# Patient Record
Sex: MALE | Race: BLACK OR AFRICAN AMERICAN | Employment: UNEMPLOYED | URBAN - METROPOLITAN AREA
[De-identification: names, ages, dates, MRNs, and addresses within clinical notes are randomized per-mention and may not be internally consistent; named-entity substitution may affect disease eponyms.]

---

## 2017-11-03 ENCOUNTER — APPOINTMENT (OUTPATIENT)
Dept: GENERAL RADIOLOGY | Age: 27
DRG: 189 | End: 2017-11-03
Attending: PHYSICIAN ASSISTANT
Payer: SELF-PAY

## 2017-11-03 ENCOUNTER — HOSPITAL ENCOUNTER (INPATIENT)
Age: 27
LOS: 3 days | Discharge: HOME | DRG: 189 | End: 2017-11-06
Attending: EMERGENCY MEDICINE | Admitting: HOSPITALIST
Payer: SELF-PAY

## 2017-11-03 DIAGNOSIS — J45.51 SEVERE PERSISTENT ASTHMA WITH ACUTE EXACERBATION: Primary | ICD-10-CM

## 2017-11-03 PROBLEM — J45.902: Status: ACTIVE | Noted: 2017-11-03

## 2017-11-03 LAB
ALBUMIN SERPL-MCNC: 3.7 G/DL (ref 3.4–5)
ALBUMIN/GLOB SERPL: 1 {RATIO} (ref 0.8–1.7)
ALP SERPL-CCNC: 51 U/L (ref 45–117)
ALT SERPL-CCNC: 21 U/L (ref 16–61)
ANION GAP SERPL CALC-SCNC: 10 MMOL/L (ref 3–18)
ARTERIAL PATENCY WRIST A: YES
ARTERIAL PATENCY WRIST A: YES
AST SERPL-CCNC: 10 U/L (ref 15–37)
BASE DEFICIT BLD-SCNC: 2 MMOL/L
BASE EXCESS BLD CALC-SCNC: 0 MMOL/L
BASOPHILS # BLD: 0 K/UL (ref 0–0.1)
BASOPHILS NFR BLD: 0 % (ref 0–2)
BDY SITE: ABNORMAL
BDY SITE: NORMAL
BILIRUB SERPL-MCNC: 1.4 MG/DL (ref 0.2–1)
BUN SERPL-MCNC: 13 MG/DL (ref 7–18)
BUN/CREAT SERPL: 12 (ref 12–20)
CALCIUM SERPL-MCNC: 9 MG/DL (ref 8.5–10.1)
CHLORIDE SERPL-SCNC: 107 MMOL/L (ref 100–108)
CO2 SERPL-SCNC: 24 MMOL/L (ref 21–32)
CREAT SERPL-MCNC: 1.09 MG/DL (ref 0.6–1.3)
DIFFERENTIAL METHOD BLD: ABNORMAL
EOSINOPHIL # BLD: 0.2 K/UL (ref 0–0.4)
EOSINOPHIL NFR BLD: 2 % (ref 0–5)
ERYTHROCYTE [DISTWIDTH] IN BLOOD BY AUTOMATED COUNT: 15 % (ref 11.6–14.5)
GAS FLOW.O2 O2 DELIVERY SYS: ABNORMAL L/MIN
GAS FLOW.O2 O2 DELIVERY SYS: NORMAL L/MIN
GLOBULIN SER CALC-MCNC: 3.7 G/DL (ref 2–4)
GLUCOSE SERPL-MCNC: 103 MG/DL (ref 74–99)
HCO3 BLD-SCNC: 25.1 MMOL/L (ref 22–26)
HCO3 BLD-SCNC: 25.4 MMOL/L (ref 22–26)
HCT VFR BLD AUTO: 47.1 % (ref 36–48)
HGB BLD-MCNC: 16.9 G/DL (ref 13–16)
LYMPHOCYTES # BLD: 2.9 K/UL (ref 0.9–3.6)
LYMPHOCYTES NFR BLD: 35 % (ref 21–52)
MCH RBC QN AUTO: 28.9 PG (ref 24–34)
MCHC RBC AUTO-ENTMCNC: 35.9 G/DL (ref 31–37)
MCV RBC AUTO: 80.5 FL (ref 74–97)
MONOCYTES # BLD: 0.6 K/UL (ref 0.05–1.2)
MONOCYTES NFR BLD: 8 % (ref 3–10)
NEUTS SEG # BLD: 4.6 K/UL (ref 1.8–8)
NEUTS SEG NFR BLD: 55 % (ref 40–73)
O2/TOTAL GAS SETTING VFR VENT: 40 %
O2/TOTAL GAS SETTING VFR VENT: 40 %
PCO2 BLD: 43.3 MMHG (ref 35–45)
PCO2 BLD: 48.2 MMHG (ref 35–45)
PEEP RESPIRATORY: 5 CMH2O
PEEP RESPIRATORY: 5 CMH2O
PH BLD: 7.32 [PH] (ref 7.35–7.45)
PH BLD: 7.38 [PH] (ref 7.35–7.45)
PIP ISTAT,IPIP: 14
PIP ISTAT,IPIP: 14
PLATELET # BLD AUTO: 222 K/UL (ref 135–420)
PMV BLD AUTO: 11 FL (ref 9.2–11.8)
PO2 BLD: 104 MMHG (ref 80–100)
PO2 BLD: 95 MMHG (ref 80–100)
POTASSIUM SERPL-SCNC: 3.8 MMOL/L (ref 3.5–5.5)
PROT SERPL-MCNC: 7.4 G/DL (ref 6.4–8.2)
RBC # BLD AUTO: 5.85 M/UL (ref 4.7–5.5)
SAO2 % BLD: 97 % (ref 92–97)
SAO2 % BLD: 97 % (ref 92–97)
SERVICE CMNT-IMP: ABNORMAL
SERVICE CMNT-IMP: NORMAL
SODIUM SERPL-SCNC: 141 MMOL/L (ref 136–145)
SPECIMEN TYPE: ABNORMAL
SPECIMEN TYPE: NORMAL
SPONTANEOUS TIMED, IST: YES
SPONTANEOUS TIMED, IST: YES
TOTAL RESP. RATE, ITRR: 17
TOTAL RESP. RATE, ITRR: 20
WBC # BLD AUTO: 8.3 K/UL (ref 4.6–13.2)

## 2017-11-03 PROCEDURE — 80053 COMPREHEN METABOLIC PANEL: CPT

## 2017-11-03 PROCEDURE — 77030013033 HC MSK BPAP/CPAP MMKA -B

## 2017-11-03 PROCEDURE — 74011000258 HC RX REV CODE- 258: Performed by: PHYSICIAN ASSISTANT

## 2017-11-03 PROCEDURE — 96365 THER/PROPH/DIAG IV INF INIT: CPT

## 2017-11-03 PROCEDURE — 94660 CPAP INITIATION&MGMT: CPT

## 2017-11-03 PROCEDURE — 94761 N-INVAS EAR/PLS OXIMETRY MLT: CPT

## 2017-11-03 PROCEDURE — 74011000250 HC RX REV CODE- 250: Performed by: HOSPITALIST

## 2017-11-03 PROCEDURE — 82803 BLOOD GASES ANY COMBINATION: CPT

## 2017-11-03 PROCEDURE — 65660000004 HC RM CVT STEPDOWN

## 2017-11-03 PROCEDURE — 74011250636 HC RX REV CODE- 250/636: Performed by: PHYSICIAN ASSISTANT

## 2017-11-03 PROCEDURE — 74011250636 HC RX REV CODE- 250/636: Performed by: HOSPITALIST

## 2017-11-03 PROCEDURE — 94640 AIRWAY INHALATION TREATMENT: CPT

## 2017-11-03 PROCEDURE — 77030029684 HC NEB SM VOL KT MONA -A

## 2017-11-03 PROCEDURE — 94644 CONT INHLJ TX 1ST HOUR: CPT

## 2017-11-03 PROCEDURE — 36600 WITHDRAWAL OF ARTERIAL BLOOD: CPT

## 2017-11-03 PROCEDURE — 74011000250 HC RX REV CODE- 250: Performed by: PHYSICIAN ASSISTANT

## 2017-11-03 PROCEDURE — 74011250636 HC RX REV CODE- 250/636: Performed by: EMERGENCY MEDICINE

## 2017-11-03 PROCEDURE — 74011000258 HC RX REV CODE- 258: Performed by: HOSPITALIST

## 2017-11-03 PROCEDURE — 74011636637 HC RX REV CODE- 636/637: Performed by: PHYSICIAN ASSISTANT

## 2017-11-03 PROCEDURE — 99285 EMERGENCY DEPT VISIT HI MDM: CPT

## 2017-11-03 PROCEDURE — 71010 XR CHEST PORT: CPT

## 2017-11-03 PROCEDURE — 74011000250 HC RX REV CODE- 250

## 2017-11-03 PROCEDURE — 85025 COMPLETE CBC W/AUTO DIFF WBC: CPT

## 2017-11-03 RX ORDER — ALBUTEROL SULFATE 0.83 MG/ML
SOLUTION RESPIRATORY (INHALATION)
Status: COMPLETED
Start: 2017-11-03 | End: 2017-11-03

## 2017-11-03 RX ORDER — ALBUTEROL SULFATE 0.83 MG/ML
2.5 SOLUTION RESPIRATORY (INHALATION)
Status: DISCONTINUED | OUTPATIENT
Start: 2017-11-03 | End: 2017-11-06 | Stop reason: HOSPADM

## 2017-11-03 RX ORDER — MAGNESIUM SULFATE HEPTAHYDRATE 40 MG/ML
2 INJECTION, SOLUTION INTRAVENOUS
Status: COMPLETED | OUTPATIENT
Start: 2017-11-03 | End: 2017-11-03

## 2017-11-03 RX ORDER — ALBUTEROL SULFATE 2.5 MG/.5ML
10 SOLUTION RESPIRATORY (INHALATION) CONTINUOUS
Status: DISCONTINUED | OUTPATIENT
Start: 2017-11-03 | End: 2017-11-06 | Stop reason: HOSPADM

## 2017-11-03 RX ORDER — ALBUTEROL SULFATE 0.83 MG/ML
2.5 SOLUTION RESPIRATORY (INHALATION)
Status: COMPLETED | OUTPATIENT
Start: 2017-11-03 | End: 2017-11-03

## 2017-11-03 RX ORDER — SODIUM CHLORIDE 0.9 % (FLUSH) 0.9 %
5-10 SYRINGE (ML) INJECTION AS NEEDED
Status: DISCONTINUED | OUTPATIENT
Start: 2017-11-03 | End: 2017-11-06 | Stop reason: HOSPADM

## 2017-11-03 RX ORDER — IPRATROPIUM BROMIDE AND ALBUTEROL SULFATE 2.5; .5 MG/3ML; MG/3ML
3 SOLUTION RESPIRATORY (INHALATION)
Status: COMPLETED | OUTPATIENT
Start: 2017-11-03 | End: 2017-11-03

## 2017-11-03 RX ORDER — IPRATROPIUM BROMIDE 0.5 MG/2.5ML
0.5 SOLUTION RESPIRATORY (INHALATION)
Status: DISCONTINUED | OUTPATIENT
Start: 2017-11-03 | End: 2017-11-06 | Stop reason: HOSPADM

## 2017-11-03 RX ORDER — ENOXAPARIN SODIUM 100 MG/ML
40 INJECTION SUBCUTANEOUS EVERY 24 HOURS
Status: DISCONTINUED | OUTPATIENT
Start: 2017-11-03 | End: 2017-11-06 | Stop reason: HOSPADM

## 2017-11-03 RX ORDER — ALBUTEROL SULFATE 2.5 MG/.5ML
SOLUTION RESPIRATORY (INHALATION)
Status: COMPLETED
Start: 2017-11-03 | End: 2017-11-03

## 2017-11-03 RX ORDER — MAGNESIUM SULFATE HEPTAHYDRATE 40 MG/ML
2 INJECTION, SOLUTION INTRAVENOUS ONCE
Status: COMPLETED | OUTPATIENT
Start: 2017-11-03 | End: 2017-11-03

## 2017-11-03 RX ORDER — MAGNESIUM SULFATE HEPTAHYDRATE 40 MG/ML
2 INJECTION, SOLUTION INTRAVENOUS ONCE
Status: DISCONTINUED | OUTPATIENT
Start: 2017-11-03 | End: 2017-11-03

## 2017-11-03 RX ORDER — SODIUM CHLORIDE 0.9 % (FLUSH) 0.9 %
5-10 SYRINGE (ML) INJECTION EVERY 8 HOURS
Status: DISCONTINUED | OUTPATIENT
Start: 2017-11-03 | End: 2017-11-06 | Stop reason: HOSPADM

## 2017-11-03 RX ORDER — PREDNISONE 20 MG/1
60 TABLET ORAL
Status: COMPLETED | OUTPATIENT
Start: 2017-11-03 | End: 2017-11-03

## 2017-11-03 RX ORDER — ALBUTEROL SULFATE 2.5 MG/.5ML
5 SOLUTION RESPIRATORY (INHALATION)
Status: COMPLETED | OUTPATIENT
Start: 2017-11-03 | End: 2017-11-03

## 2017-11-03 RX ADMIN — CEFTRIAXONE SODIUM 1 G: 1 INJECTION, POWDER, FOR SOLUTION INTRAMUSCULAR; INTRAVENOUS at 20:07

## 2017-11-03 RX ADMIN — Medication 10 ML: at 17:07

## 2017-11-03 RX ADMIN — MAGNESIUM SULFATE IN WATER 2 G: 40 INJECTION, SOLUTION INTRAVENOUS at 17:19

## 2017-11-03 RX ADMIN — ALBUTEROL SULFATE 10 MG: 2.5 SOLUTION RESPIRATORY (INHALATION) at 10:37

## 2017-11-03 RX ADMIN — IPRATROPIUM BROMIDE AND ALBUTEROL SULFATE 3 ML: .5; 3 SOLUTION RESPIRATORY (INHALATION) at 09:00

## 2017-11-03 RX ADMIN — MAGNESIUM SULFATE IN WATER 2 G: 40 INJECTION, SOLUTION INTRAVENOUS at 09:00

## 2017-11-03 RX ADMIN — ENOXAPARIN SODIUM 40 MG: 40 INJECTION SUBCUTANEOUS at 19:41

## 2017-11-03 RX ADMIN — ALBUTEROL SULFATE 5 MG: 2.5 SOLUTION RESPIRATORY (INHALATION) at 09:08

## 2017-11-03 RX ADMIN — METHYLPREDNISOLONE SODIUM SUCCINATE 65 MG: 40 INJECTION, POWDER, FOR SOLUTION INTRAMUSCULAR; INTRAVENOUS at 17:19

## 2017-11-03 RX ADMIN — ALBUTEROL SULFATE 2.5 MG: 2.5 SOLUTION RESPIRATORY (INHALATION) at 18:19

## 2017-11-03 RX ADMIN — PREDNISONE 60 MG: 20 TABLET ORAL at 09:00

## 2017-11-03 RX ADMIN — METHYLPREDNISOLONE SODIUM SUCCINATE 125 MG: 125 INJECTION, POWDER, FOR SOLUTION INTRAMUSCULAR; INTRAVENOUS at 19:41

## 2017-11-03 RX ADMIN — ALBUTEROL SULFATE 2.5 MG: 2.5 SOLUTION RESPIRATORY (INHALATION) at 09:33

## 2017-11-03 RX ADMIN — DOXYCYCLINE 100 MG: 100 INJECTION, POWDER, LYOPHILIZED, FOR SOLUTION INTRAVENOUS at 15:04

## 2017-11-03 RX ADMIN — ALBUTEROL SULFATE 2.5 MG: 0.83 SOLUTION RESPIRATORY (INHALATION) at 09:33

## 2017-11-03 RX ADMIN — Medication 10 ML: at 21:39

## 2017-11-03 NOTE — H&P
[address] [place] [address] PS    Name:  Saud Hernandez  MR#:  [id number]  :  1990  Account #:  [de-identified]  Date of Adm:  2017      CHIEF COMPLAINT: Shortness of breath. HISTORY OF PRESENT ILLNESS: The patient is a very pleasant 32  year-old [facility] gentleman with a known history of chronic  persistent asthma. He states that over the last 3 years, it has been  getting worse. He states that this morning when he woke up, he noted  that his chest was tight and he had worsening shortness of breath. He  subsequently used his nebulizers at home; however, this did not  improve his respiratory function. He continued to have shortness of  breath and subsequently came to the emergency department at  [facility] for further evaluation. The patient denies any recent sick contacts. He denies any recent cold  or upper respiratory illness symptoms. He indicates that he has never  had to be intubated for his asthma symptoms, however, he has been  admitted to the hospital before for uncontrolled asthma. PAST MEDICAL HISTORY: Asthma. SURGICAL HISTORY: Negative. SOCIAL HISTORY: He is a , he smokes 0-04 cigarettes per  day, he denies alcohol or illicit drug use. FAMILY HISTORY: Mother with a history of hypertension and  prediabetes. Father's medical history is unknown. HOME MEDICATIONS: None. REVIEW OF SYSTEMS  Complete 12-point review of systems is negative other than that noted  in the HPI above. PHYSICAL EXAMINATION  VITAL SIGNS: Temperature 97.9, pulse 78, respiration 16, blood  pressure 126/62, pulse oximetry 99% on BiPAP. GENERAL: The patient is a well-nourished 45-year-old male in no  acute distress. HEENT: Eyes PERRLA. Ears are clear. Nose is clear. No rhinorrhea or  sinusitis. Throat is clear with no pharyngitis or oral lesions. Head  normocephalic, atraumatic. NECK: Supple. No masses or tenderness.  No thyromegaly or  lymphadenopathy is present. LUNGS: Are tight sounding anteriorly with no appreciable wheezes  present in the anterior lung fields. Posteriorly, he has some very mild  expiratory wheezes heard throughout the lung field. No prolonged  expiratory phase is appreciated. HEART: Regular. No murmurs, rubs or gallops. ABDOMEN: Soft, nontender, nondistended with bowel sounds present  in all 4 quadrants. EXTREMITIES: Warm. Pulses are patent bilaterally. No lower  extremity edema is present. SKIN: No lesions or rashes. NEUROLOGIC: Cranial nerves 2-12 are grossly intact. LABORATORY DATA: WBC is 8.3, hemoglobin 16.9, hematocrit 47.1,  platelets 239. Sodium 141, potassium 3.8, chloride 107, bicarbonate  27, BUN 13, creatinine 1.09. LFTs are within normal limits. Arterial  blood gas shows a pH of 7.324, pCO2 of 48.2, pO2 of 104. This is  on 40% FIO2, BiPAP, rate of 17, and PEEP of 5. Chest x-ray was  reviewed. This is negative for any acute cardiopulmonary process. No  evidence of any infiltrate. ASSESSMENT: Acute asthma exacerbation with hypoxemic  respiratory failure. PLAN: The patient is admitted to step-down unit on BiPAP. We will  wean BiPAP as tolerated to room air. For his asthma exacerbation, I  have written for DuoNebs including albuterol and ipratropium and I  have ordered magnesium 2 grams IV. With regard to DVT prophylaxis,  I have ordered Lovenox daily. I have ordered Solu-Medrol 125 mg IV  every 8 hours and I have only done this for 3 doses. Further steroids  per the daytime hospitalist tomorrow. THE PATIENT IS A FULL CODE. I anticipate discharge home in the next 2-3 days when his respiratory  function is stabilized.         MD Lizette Larose / Erika Sadler  D:  11/03/2017   14:22  T:  11/03/2017   14:54  Job #:  [id number]

## 2017-11-03 NOTE — PROGRESS NOTES
Placed pt on BiPap 14/5, 40% FiO2. Pt in significant distress sitting at edge of bed. Placed patient fully in bed and connected BiPap mask. WOB immediately improved and pt fell asleep. HR, RR, and BS improved. ABG normal on current settings. Will continue to monitor.

## 2017-11-03 NOTE — ED NOTES
Ambulated pt to nursing station. Pt noted to have labored breathing during ambulation with O2 sats ranging from 87% to 93% on RA. Dr. Santiago Burns and NEYDA Morillo aware. Pt placed back on BiPAP and cardiac monitor. Will continue to monitor.

## 2017-11-03 NOTE — ED NOTES
Pt currently sleeping on stretcher on cardiac monitor. Pt remains on BiPAP at this time. Will transition off once pt is awake. VSS. Will continue to monitor.

## 2017-11-03 NOTE — ED NOTES
Received bedside report from Drea Velasco, [facility]. Pt sitting on bedside in mild amount of distress. Pt currently receiving a Duoneb treatment. Pt mild audible wheezes. NEYDA Antonio at the bedside to evaluate pt.

## 2017-11-03 NOTE — ROUTINE PROCESS
TRANSFER - OUT REPORT:    Verbal report given to Jj RN(name) on Lyn Awad  being transferred to CVT Stepdown(unit) for routine progression of care       Report consisted of patients Situation, Background, Assessment and   Recommendations(SBAR). Information from the following report(s) SBAR, ED Summary, Intake/Output, MAR, Recent Results and Cardiac Rhythm NSR was reviewed with the receiving nurse. Lines:   Peripheral IV 11/03/17 Right Antecubital (Active)   Site Assessment Clean, dry, & intact 11/3/2017  9:22 AM   Phlebitis Assessment 0 11/3/2017  9:22 AM   Infiltration Assessment 0 11/3/2017  9:22 AM   Dressing Status Clean, dry, & intact 11/3/2017  9:22 AM   Dressing Type Transparent 11/3/2017  9:22 AM        Opportunity for questions and clarification was provided.       Patient transported with:   Monitor  O2 @ BiPAP liters  Registered Nurse  Tech   Patient belongings

## 2017-11-03 NOTE — ED NOTES
Pt skleeping in bed comfortably on bedside monitor. Call bell is in reach. Bed in LLP. Will continue to monitor.

## 2017-11-03 NOTE — ED NOTES
Ambulated pt around department. Pt noted to become increasingly lethargic with ambulation. During ambulation pts' O2 sats were at 94% on RA and HR in the 130's. Pt required frequent rests with ambulation and required a wheel chair to get back to room. Pt reports that he feels like he needs to be placed back on BiPAP. Pt placed back on BiPAP. PA made aware.

## 2017-11-03 NOTE — ED NOTES
Pt taken off of BiPAP at this time. Pt noted to be very lethargic. Pt encouraged to try to remain awake. Pt reports that he feels much better. Pt's lungs are CTA. Pt maintaining O2 sats at 98% on RA. NEYDA Antonio made aware.

## 2017-11-03 NOTE — ED PROVIDER NOTES
HPI Comments: Katiana Beckwith is a 32 y.o. Male with a medical history of Asthma, who presents today with a 2 hour history of SOB, which awoke him from his sleep. He tried 2 puffs of albuterol at home with no relief. He denies any known triggers, was hospitalized last year for asthma, and has never been intubated. He is speaking in full sentences but very distressed and diaphoretic. He denies any recent illness. Pt denies any fevers or chills, headache, dizziness or light headedness, ENT issues, CP or discomfort, cough, n/v/d/c, abd pain, back pain, melena/hematochezia, dysuria, hematuria, frequency, focal weakness/numbness/tingling, or rash. Patient has no other complaints at this time. The history is provided by the patient. No past medical history on file. No past surgical history on file. No family history on file. Social History     Social History    Marital status: SINGLE     Spouse name: N/A    Number of children: N/A    Years of education: N/A     Occupational History    Not on file. Social History Main Topics    Smoking status: Not on file    Smokeless tobacco: Not on file    Alcohol use Not on file    Drug use: Not on file    Sexual activity: Not on file     Other Topics Concern    Not on file     Social History Narrative         ALLERGIES: Review of patient's allergies indicates no known allergies. Review of Systems   Constitutional: Positive for diaphoresis. Negative for chills, fatigue and fever. HENT: Negative for congestion, rhinorrhea and sore throat. Eyes: Negative for discharge. Respiratory: Positive for chest tightness, shortness of breath and wheezing. Negative for cough and stridor. Cardiovascular: Positive for palpitations. Negative for chest pain. Gastrointestinal: Negative for abdominal pain, constipation, diarrhea, nausea and vomiting. Genitourinary: Negative for difficulty urinating, dysuria and urgency.    Musculoskeletal: Negative for back pain and neck pain. Skin: Negative for color change, pallor, rash and wound. Neurological: Negative for dizziness, syncope, light-headedness and headaches. Hematological: Does not bruise/bleed easily. Vitals:    11/03/17 0841   BP: 132/87   Pulse: (!) 110   Resp: 24   SpO2: 96%   Weight: 136.1 kg (300 lb)   Height: 6' 4\" (1.93 m)            Physical Exam   Constitutional: He is oriented to person, place, and time. He appears distressed. He is not intubated. Pulmonary/Chest: Accessory muscle usage present. Tachypnea noted. No apnea. He is not intubated. He is in respiratory distress. He has decreased breath sounds. He has wheezes in the right upper field, the right middle field, the right lower field, the left upper field, the left middle field and the left lower field. He has no rhonchi. He has no rales. Abdominal: Soft. Bowel sounds are normal. There is no tenderness. Musculoskeletal: Normal range of motion. Neurological: He is alert and oriented to person, place, and time. Skin: Skin is warm. No rash noted. He is diaphoretic. No erythema. No pallor. Psychiatric: He has a normal mood and affect. His behavior is normal.   Nursing note and vitals reviewed. MDM  Number of Diagnoses or Management Options  Severe persistent asthma with acute exacerbation:   Diagnosis management comments: DDx:  viral vs bacterial URI, asthma exacerbation, COPD, bronchitis, PNA, PE, angina/MI, allergies, pneumothorax, atypical CP, costochondritis/chest wall pain, HF, TAA/AAA, pericarditis, trauma (cardiac contusion, rib Fx, etc),  pleural effusion, intraabdominal process, anxiety/panic attack, anaphylaxis    Plan: Order duo neb, 60 mg of prednisone, and 2g of mag. Reassess patient :after treatment. Will order portable chest xray to rule out any acute cardiopulmonary process.    9:34 AM After 1 duo neb, 5 mg of albuterol, 60 prednisone, and 2 g of Mag patient still tachypnea at a resp rate of 30 and 02 sat of 97%, diffuse wheezing throughout all lung fields. Spoke with respiratory and they agree to put him on Bipap and I have ordered continuous nebs through bipap.     1000: AM Talked with Husam from respiratory, he has placed Bipap and patient has improved. ABG ordered and normal at 0953    10:30 AM Patient states he is feeling much better. Accessory muscle use has ceased. HR 87 and RR 20. Will attempt to take of BiPaP and ambulate patient to see how he does off BiPaP. Will repeat ABG. 12:43 PM: Nursing Ambulated pt around department. Pt noted to become increasingly lethargic with ambulation. O2 sat was 94% on RA and HR in the 130's. Pt required frequent rests with ambulation and required a wheel chair to get back to room. Pt reports that he feels like he needs to be placed back on BiPAP. Pt placed back on BiPAP.     1:44 PM Spoke with Dr. Consuelo Hickman about the patient and he has agreed to see the patient. He requested 65 steroids, 1g of rocephin and 100 of doxy IV. No acute cardiopulmonary process on chest xray.         Amount and/or Complexity of Data Reviewed  Clinical lab tests: ordered and reviewed    Risk of Complications, Morbidity, and/or Mortality  Presenting problems: moderate  Diagnostic procedures: moderate  Management options: moderate      ED Course       Procedures

## 2017-11-03 NOTE — IP AVS SNAPSHOT
Sarah Stout 
 
 
 [address] [facility] [address] 
[phone] Patient: Shoshana Regalado MRN: [id number] :1990 My Medications TAKE these medications as instructed Instructions Each Dose to Equal  
 Morning Noon Evening Bedtime  
 azithromycin 250 mg tablet Commonly known as:  Marina Encarnacion Your last dose was: Your next dose is: Take 1 Tab by mouth daily for 5 days. 250 mg  
    
   
   
   
  
 guaiFENesin  mg ER tablet Commonly known as:  [employer] Your last dose was: Your next dose is: Take 1 Tab by mouth two (2) times a day. 600 mg  
    
   
   
   
  
 ipratropium-albuterol  mcg/actuation inhaler Commonly known as:  Ting Floyd Your last dose was: Your next dose is: Take 1 Puff by inhalation every six (6) hours. 1 Puff * predniSONE 20 mg tablet Commonly known as:  Carlo Cruz Your last dose was: Your next dose is: Take 2 Tabs by mouth daily (with breakfast). For 3 days. 40 mg  
    
   
   
   
  
 * predniSONE 20 mg tablet Commonly known as:  Carlo Cruz Your last dose was: Your next dose is: Take 1 Tab by mouth daily (with breakfast) for 3 days. 20 mg  
    
   
   
   
  
 * predniSONE 10 mg tablet Commonly known as:  Carlo Cruz Your last dose was: Your next dose is: Take 1 Tab by mouth daily (with breakfast) for 3 days. 10 mg  
    
   
   
   
  
 * Notice: This list has 3 medication(s) that are the same as other medications prescribed for you. Read the directions carefully, and ask your doctor or other care provider to review them with you. Where to Get Your Medications Information on where to get these meds will be given to you by the nurse or doctor. ! Ask your nurse or doctor about these medications azithromycin 250 mg tablet  
 guaiFENesin  mg ER tablet  
 ipratropium-albuterol  mcg/actuation inhaler  
 predniSONE 10 mg tablet  
 predniSONE 20 mg tablet  
 predniSONE 20 mg tablet

## 2017-11-03 NOTE — IP AVS SNAPSHOT
[address] [address] 
[phone] Patient: Justin Tinajero MRN: [id number] :1990 About your hospitalization You were admitted on:  November 3, 2017 You last received care in the:  [facility] 2 CV STEPDOWN You were discharged on:  2017 Why you were hospitalized Your primary diagnosis was:  Not on File Your diagnoses also included:  Status Asthmaticus, Non-Allergic Things You Need To Do (next 8 weeks)  Follow up with Paddy Galvan MD  
@ 10am  
  
Phone:  [phone] Where:  [facility], [address] [place] [address], [address] [place] [address]  New Patient with Floridalma Owens MD at  8:30 AM  
Where: [address] ([facility]) Follow up with Yefri Betts MD  
@ 3010 Phone:  [phone] Where:  [address] [place] [id number] Discharge Orders None A check kaelyn indicates which time of day the medication should be taken. My Medications TAKE these medications as instructed Instructions Each Dose to Equal  
 Morning Noon Evening Bedtime  
 azithromycin 250 mg tablet Commonly known as:  Sherren Mohair Your last dose was: Your next dose is: Take 1 Tab by mouth daily for 5 days. 250 mg  
    
   
   
   
  
 guaiFENesin  mg ER tablet Commonly known as:  Dean & Dean Your last dose was: Your next dose is: Take 1 Tab by mouth two (2) times a day. 600 mg  
    
   
   
   
  
 ipratropium-albuterol  mcg/actuation inhaler Commonly known as:  Jose Erwin Your last dose was: Your next dose is: Take 1 Puff by inhalation every six (6) hours. 1 Puff * predniSONE 20 mg tablet Commonly known as:  Efraín Bello  
 Your last dose was: Your next dose is: Take 2 Tabs by mouth daily (with breakfast). For 3 days. 40 mg  
    
   
   
   
  
 * predniSONE 20 mg tablet Commonly known as:  Mario Alberto Khan Your last dose was: Your next dose is: Take 1 Tab by mouth daily (with breakfast) for 3 days. 20 mg  
    
   
   
   
  
 * predniSONE 10 mg tablet Commonly known as:  Mario Alberto Khan Your last dose was: Your next dose is: Take 1 Tab by mouth daily (with breakfast) for 3 days. 10 mg  
    
   
   
   
  
 * Notice: This list has 3 medication(s) that are the same as other medications prescribed for you. Read the directions carefully, and ask your doctor or other care provider to review them with you. Where to Get Your Medications Information on where to get these meds will be given to you by the nurse or doctor. ! Ask your nurse or doctor about these medications  
  azithromycin 250 mg tablet  
 guaiFENesin  mg ER tablet  
 ipratropium-albuterol  mcg/actuation inhaler  
 predniSONE 10 mg tablet  
 predniSONE 20 mg tablet  
 predniSONE 20 mg tablet Discharge Instructions Patient armband removed and shredded Take medication as directed. If unable to keep follow up appointment please call and inform office at least 24hours prior to appointment [employer] Announcement We are excited to announce that we are making your provider's discharge notes available to you in [employer]. You will see these notes when they are completed and signed by the physician that discharged you from your recent hospital stay. If you have any questions or concerns about any information you see in [employer], please call the Health Information Department where you were seen or reach out to your Primary Care Provider for more information about your plan of care. Introducing [facility]! [facility] introduces Koozoo patient portal. Now you can access parts of your medical record, email your doctor's office, and request medication refills online. 1. In your internet browser, go to [email or web address] 2. Click on the First Time User? Click Here link in the Sign In box. You will see the New Member Sign Up page. 3. Enter your Koozoo Access Code exactly as it appears below. You will not need to use this code after youve completed the sign-up process. If you do not sign up before the expiration date, you must request a new code. · Koozoo Access Code: [email or web address] Expires: 2/4/2018  1:26 PM 
 
4. Enter the last four digits of your Social Security Number (xxxx) and Date of Birth (mm/dd/yyyy) as indicated and click Submit. You will be taken to the next sign-up page. 5. Create a Koozoo ID. This will be your Koozoo login ID and cannot be changed, so think of one that is secure and easy to remember. 6. Create a Koozoo password. You can change your password at any time. 7. Enter your Password Reset Question and Answer. This can be used at a later time if you forget your password. 8. Enter your e-mail address. You will receive e-mail notification when new information is available in [address] 9. Click Sign Up. You can now view and download portions of your medical record. 10. Click the Download Summary menu link to download a portable copy of your medical information. If you have questions, please visit the Frequently Asked Questions section of the Koozoo website. Remember, Koozoo is NOT to be used for urgent needs. For medical emergencies, dial 911. Now available from your iPhone and Android! Providers Seen During Your Hospitalization Provider Specialty Primary office phone Gaby Rojo DO Emergency Medicine [phone] Abel Massey MD Family Practice [phone] Immunizations Administered for This Admission Name Date Influenza Vaccine (Quad) PF  Deferred () Your Primary Care Physician (PCP) Primary Care Physician Office Phone Office Fax Horacio Durbin [phone] You are allergic to the following No active allergies Recent Documentation Height Weight BMI  
  
  
 1.93 m 144 kg 38.64 kg/m2 Emergency Contacts Name Discharge Info Relation Home Work Mobile Michelle Flood DISCHARGE CAREGIVER [3] Parent [1] [phone] [facility] DISCHARGE CAREGIVER [3] Spouse [3] [phone] Patient Belongings The following personal items are in your possession at time of discharge: 
  Dental Appliances: None  Visual Aid: None      Home Medications: None   Jewelry: Watch  Clothing: Belt, Footwear, Pants, Shirt, Socks, Undergarments    Other Valuables: None Discharge Instructions Attachments/References ASTHMA ATTACK (ENGLISH) ASTHMA TRIGGERS: GENERAL INFO (ENGLISH) ASTHMA: GENERAL INFO (ENGLISH) HEART FAILURE (ENGLISH) SMOKING: STOPPING (ENGLISH) SMOKING CESSATION: HEALTH BENEFITS: GENERAL INFO (ENGLISH) DRUG USE: MARIJUANA (ENGLISH) Patient Handouts Asthma Attack: Care Instructions Your Care Instructions During an asthma attack, the airways swell and narrow. This makes it hard to breathe. Severe asthma attacks can be life-threatening, but you can help prevent them by keeping your asthma under control and treating symptoms before they get bad. Symptoms include being short of breath, having chest tightness, coughing, and wheezing. Noting and treating these symptoms can also help you avoid future trips to the emergency room. The doctor has checked you carefully, but problems can develop later. If you notice any problems or new symptoms, get medical treatment right away. Follow-up care is a key part of your treatment and safety.  Be sure to make and go to all appointments, and call your doctor if you are having problems. It's also a good idea to know your test results and keep a list of the medicines you take. How can you care for yourself at home? · Follow your asthma action plan to prevent and treat attacks. If you don't have an asthma action plan, work with your doctor to create one. · Take your asthma medicines exactly as prescribed. Talk to your doctor right away if you have any questions about how to take them. ¨ Use your quick-relief medicine when you have symptoms of an attack. Quick-relief medicine is usually an albuterol inhaler. Some people need to use quick-relief medicine before they exercise. ¨ Take your controller medicine every day, not just when you have symptoms. Controller medicine is usually an inhaled corticosteroid. The goal is to prevent problems before they occur. Don't use your controller medicine to treat an attack that has already started. It doesn't work fast enough to help. ¨ If your doctor prescribed corticosteroid pills to use during an attack, take them exactly as prescribed. It may take hours for the pills to work, but they may make the episode shorter and help you breathe better. ¨ Keep your quick-relief medicine with you at all times. · Talk to your doctor before using other medicines. Some medicines, such as aspirin, can cause asthma attacks in some people. · If you have a peak flow meter, use it to check how well you are breathing. This can help you predict when an asthma attack is going to occur. Then you can take medicine to prevent the asthma attack or make it less severe. · Do not smoke or allow others to smoke around you. Avoid smoky places. Smoking makes asthma worse. If you need help quitting, talk to your doctor about stop-smoking programs and medicines. These can increase your chances of quitting for good. · Learn what triggers an asthma attack for you, and avoid the triggers when you can.  Common triggers include colds, smoke, air pollution, dust, pollen, mold, pets, cockroaches, stress, and cold air. · Avoid colds and the flu. Get a pneumococcal vaccine shot. If you have had one before, ask your doctor if you need a second dose. Get a flu vaccine every fall. If you must be around people with colds or the flu, wash your hands often. When should you call for help? Call 911 anytime you think you may need emergency care. For example, call if: 
? · You have severe trouble breathing. ?Call your doctor now or seek immediate medical care if: 
? · Your symptoms do not get better after you have followed your asthma action plan. ? · You have new or worse trouble breathing. ? · Your coughing and wheezing get worse. ? · You cough up dark brown or bloody mucus (sputum). ? · You have a new or higher fever. ? Watch closely for changes in your health, and be sure to contact your doctor if: 
? · You need to use quick-relief medicine on more than 2 days a week (unless it is just for exercise). ? · You cough more deeply or more often, especially if you notice more mucus or a change in the color of your mucus. ? · You are not getting better as expected. Where can you learn more? Go to http://evangelina-anne-marie.info/. Enter J591 in the search box to learn more about \"Asthma Attack: Care Instructions. \" Current as of: May 12, 2017 Content Version: 11.4 © 3598-9501 [employer]. Care instructions adapted under license by [employer] (which disclaims liability or warranty for this information). If you have questions about a medical condition or this instruction, always ask your healthcare professional. [address] any warranty or liability for your use of this information. Learning About Asthma Triggers What are asthma triggers? When you have asthma, certain things can make your symptoms worse. These are called triggers.  Learn what triggers an asthma attack for you, and avoid the triggers when you can. Common triggers include colds, smoke, air pollution, dust, pollen, pets, stress, and cold air. How do asthma triggers affect you? Triggers can make it harder for your lungs to work as they should. They can lead to sudden breathing problems and other symptoms. When you are around a trigger, an asthma attack is more likely. If your symptoms are severe, you may need emergency treatment or have to go to the hospital for treatment. What can you do to avoid triggers? The first thing is to know your triggers. When you are having symptoms, note the things around you that might be causing them. Then look for patterns that may be triggering your symptoms. Record your triggers on a piece of paper or in an asthma diary. When you have your list of possible triggers, work with your doctor to find ways to avoid them. Avoid colds and flu. Get a pneumococcal vaccine shot. If you have had one before, ask your doctor whether you need a second dose. Get a flu vaccine every year, as soon as it's available. If you must be around people with colds or the flu, wash your hands often. Here are some ways to avoid a few common triggers. · Do not smoke or allow others to smoke around you. If you need help quitting, talk to your doctor about stop-smoking programs and medicines. These can increase your chances of quitting for good. · If there is a lot of pollution, pollen, or dust outside, stay at home and keep your windows closed. Use an air conditioner or air filter in your home. Check your local weather report or newspaper for air quality and pollen reports. What else should you know? · Take your controller medicine every day, not just when you have symptoms. It helps prevent problems before they occur. · Your doctor may suggest that you check how well your lungs are working by measuring your peak expiratory flow (PEF) throughout the day.  Your PEF may drop when you are near things that trigger symptoms. Where can you learn more? Go to http://evangelina-anne-marie.info/. Enter B854 in the search box to learn more about \"Learning About Asthma Triggers. \" Current as of: May 12, 2017 Content Version: 11.4 © 4617-6249 [employer]. Care instructions adapted under license by "[employer]" (which disclaims liability or warranty for this information). If you have questions about a medical condition or this instruction, always ask your healthcare professional. [address] any warranty or liability for your use of this information. Learning About Asthma What is asthma? Asthma is a long-term condition that affects your breathing. It causes the airways that lead to the lungs to swell. People with asthma may have asthma attacks. During an asthma attack, the airways tighten and become narrower. This makes it hard to breathe, and you may wheeze or cough. If you have a bad asthma attack, you may need emergency care. Asthma affects people in different ways. Some people only have asthma attacks during allergy season, or when they breathe in cold air, or when they exercise. Others have many bad attacks that send them to the doctor often. What are the symptoms? Symptoms of asthma can be mild or severe. You may have mild attacks now and then, you may have severe symptoms every day, or you may have something in between. How often you have symptoms can also change. When you have asthma, you may: · Wheeze, making a loud or soft whistling noise when you breathe in and out. · Cough a lot. · Feel tightness in your chest. 
· Feel short of breath. · Have trouble sleeping because of coughing or having a hard time breathing. · Get tired quickly during exercise. Your symptoms may be worse at night. How can you prevent asthma attacks? Certain things can make asthma symptoms worse. These are called triggers. When you are around a trigger, an asthma attack is more likely. Common triggers include: · Cigarette smoke or air pollution. · Things you are allergic to, such as: 
¨ Pollen, mold, or dust mites. ¨ Pet hair, skin, or saliva. · Illnesses, like colds, flu, or pneumonia. · Exercise. · Dry, cold air. Here are some ways to avoid a few common triggers: · Do not smoke or allow others to smoke around you. If you need help quitting, talk to your doctor about stop-smoking programs and medicines. These can increase your chances of quitting for good. · If there is a lot of pollution, pollen, or dust outside, stay at home and keep your windows closed. Use an air conditioner or air filter in your home. Check your local weather report or newspaper for air quality and pollen reports. · Get the flu vaccine every year. Talk to your doctor about getting a pneumococcal shot. Wash your hands often to prevent infections. · Avoid exercising outdoors in cold weather. If you are outdoors in cold weather, wear a scarf around your face and breathe through your nose. How is asthma treated? There are two parts to treating asthma, which are outlined in your asthma action plan. The goals are to: 
· Control asthma over the long term. The asthma action plan tells you which medicine you may need to take every day. This is called a controller medicine. It helps to reduce the swelling of the airways and prevent asthma attacks. · Treat asthma attacks when they occur. The asthma action plan tells you what to do when you have an asthma attack. It helps you identify triggers that can cause your attacks. You use quick-relief medicine during an attack. The asthma plan also helps you track your symptoms and know how well the treatment is working. Follow-up care is a key part of your treatment and safety.  Be sure to make and go to all appointments, and call your doctor if you are having problems. It's also a good idea to know your test results and keep a list of the medicines you take. Where can you learn more? Go to http://evangelina-anne-marie.info/. Enter 9187 [id number] in the search box to learn more about \"Learning About Asthma. \" Current as of: May 12, 2017 Content Version: 11.4 © 9543-9852 [employer]. Care instructions adapted under license by [employer] (which disclaims liability or warranty for this information). If you have questions about a medical condition or this instruction, always ask your healthcare professional. [address] any warranty or liability for your use of this information. Heart Failure: Care Instructions Your Care Instructions Heart failure occurs when your heart does not pump as much blood as the body needs. Failure does not mean that the heart has stopped pumping but rather that it is not pumping as well as it should. Over time, this causes fluid buildup in your lungs and other parts of your body. Fluid buildup can cause shortness of breath, fatigue, swollen ankles, and other problems. By taking medicines regularly, reducing sodium (salt) in your diet, checking your weight every day, and making lifestyle changes, you can feel better and live longer. Follow-up care is a key part of your treatment and safety. Be sure to make and go to all appointments, and call your doctor if you are having problems. It's also a good idea to know your test results and keep a list of the medicines you take. How can you care for yourself at home? Medicines ? · Be safe with medicines. Take your medicines exactly as prescribed. Call your doctor if you think you are having a problem with your medicine.   
? · Do not take any vitamins, over-the-counter medicine, or herbal products without talking to your doctor first. Eugenia Caldwell not take ibuprofen (Advil or Motrin) and naproxen (Aleve) without talking to your doctor first. They could make your heart failure worse. ? · You may be taking some of the following medicine. ¨ Beta-blockers can slow heart rate, decrease blood pressure, and improve your condition. Taking a beta-blocker may lower your chance of needing to be hospitalized. ¨ Angiotensin-converting enzyme inhibitors (ACEIs) reduce the heart's workload, lower blood pressure, and reduce swelling. Taking an ACEI may lower your chance of needing to be hospitalized again. ¨ Angiotensin II receptor blockers (ARBs) work like ACEIs. Your doctor may prescribe them instead of ACEIs. ¨ Diuretics, also called water pills, reduce swelling. ¨ Potassium supplements replace this important mineral, which is sometimes lost with diuretics. ¨ Aspirin and other blood thinners prevent blood clots, which can cause a stroke or heart attack. ? You will get more details on the specific medicines your doctor prescribes. Diet ? · Your doctor may suggest that you limit sodium to 2,000 milligrams (mg) a day or less. That is less than 1 teaspoon of salt a day, including all the salt you eat in cooking or in packaged foods. People get most of their sodium from processed foods. Fast food and restaurant meals also tend to be very high in sodium. ? · Ask your doctor how much liquid you can drink each day. You may have to limit liquids. ?Weight ? · Weigh yourself without clothing at the same time each day. Record your weight. Call your doctor if you have a sudden weight gain, such as more than 2 to 3 pounds in a day or 5 pounds in a week. (Your doctor may suggest a different range of weight gain.) A sudden weight gain may mean that your heart failure is getting worse. ? Activity level ? · Start light exercise (if your doctor says it is okay).  Even if you can only do a small amount, exercise will help you get stronger, have more energy, and manage your weight and your stress. Walking is an easy way to get exercise. Start out by walking a little more than you did before. Bit by bit, increase the amount you walk. ? · When you exercise, watch for signs that your heart is working too hard. You are pushing yourself too hard if you cannot talk while you are exercising. If you become short of breath or dizzy or have chest pain, stop, sit down, and rest.  
? · If you feel \"wiped out\" the day after you exercise, walk slower or for a shorter distance until you can work up to a better pace. ? · Get enough rest at night. Sleeping with 1 or 2 pillows under your upper body and head may help you breathe easier. ? Lifestyle changes ? · Do not smoke. Smoking can make a heart condition worse. If you need help quitting, talk to your doctor about stop-smoking programs and medicines. These can increase your chances of quitting for good. Quitting smoking may be the most important step you can take to protect your heart. ? · Limit alcohol to 2 drinks a day for men and 1 drink a day for women. Too much alcohol can cause health problems. ? · Avoid getting sick from colds and the flu. Get a pneumococcal vaccine shot. If you have had one before, ask your doctor whether you need another dose. Get a flu shot each year. If you must be around people with colds or the flu, wash your hands often. When should you call for help? Call 911 if you have symptoms of sudden heart failure such as: 
? · You have severe trouble breathing. ? · You cough up pink, foamy mucus. ? · You have a new irregular or rapid heartbeat. ?Call your doctor now or seek immediate medical care if: 
? · You have new or increased shortness of breath. ? · You are dizzy or lightheaded, or you feel like you may faint. ? · You have sudden weight gain, such as more than 2 to 3 pounds in a day or 5 pounds in a week. (Your doctor may suggest a different range of weight gain.) ? · You have increased swelling in your legs, ankles, or feet. ? · You are suddenly so tired or weak that you cannot do your usual activities. ? Watch closely for changes in your health, and be sure to contact your doctor if you develop new symptoms. Where can you learn more? Go to http://evangelina-anne-marie.info/. Enter O655 in the search box to learn more about \"Heart Failure: Care Instructions. \" Current as of: September 21, 2016 Content Version: 11.4 © 3205-3239 [employer]. Care instructions adapted under license by [employer] (which disclaims liability or warranty for this information). If you have questions about a medical condition or this instruction, always ask your healthcare professional. [address] any warranty or liability for your use of this information. Stopping Smoking: Care Instructions Your Care Instructions Cigarette smokers crave the nicotine in cigarettes. Giving it up is much harder than simply changing a habit. Your body has to stop craving the nicotine. It is hard to quit, but you can do it. There are many tools that people use to quit smoking. You may find that combining tools works best for you. There are several steps to quitting. First you get ready to quit. Then you get support to help you. After that, you learn new skills and behaviors to become a nonsmoker. For many people, a necessary step is getting and using medicine. Your doctor will help you set up the plan that best meets your needs. You may want to attend a smoking cessation program to help you quit smoking. When you choose a program, look for one that has proven success. Ask your doctor for ideas.  You will greatly increase your chances of success if you take medicine as well as get counseling or join a cessation program. 
Some of the changes you feel when you first quit tobacco are uncomfortable. Your body will miss the nicotine at first, and you may feel short-tempered and grumpy. You may have trouble sleeping or concentrating. Medicine can help you deal with these symptoms. You may struggle with changing your smoking habits and rituals. The last step is the tricky one: Be prepared for the smoking urge to continue for a time. This is a lot to deal with, but keep at it. You will feel better. Follow-up care is a key part of your treatment and safety. Be sure to make and go to all appointments, and call your doctor if you are having problems. It's also a good idea to know your test results and keep a list of the medicines you take. How can you care for yourself at home? · Ask your family, friends, and coworkers for support. You have a better chance of quitting if you have help and support. · Join a support group, such as Nicotine Anonymous, for people who are trying to quit smoking. · Consider signing up for a smoking cessation program, such as the American Lung Association's Freedom from Smoking program. 
· Set a quit date. Pick your date carefully so that it is not right in the middle of a big deadline or stressful time. Once you quit, do not even take a puff. Get rid of all ashtrays and lighters after your last cigarette. Clean your house and your clothes so that they do not smell of smoke. · Learn how to be a nonsmoker. Think about ways you can avoid those things that make you reach for a cigarette. ¨ Avoid situations that put you at greatest risk for smoking. For some people, it is hard to have a drink with friends without smoking. For others, they might skip a coffee break with coworkers who smoke. ¨ Change your daily routine. Take a different route to work or eat a meal in a different place. · Cut down on stress. Calm yourself or release tension by doing an activity you enjoy, such as reading a book, taking a hot bath, or gardening. · Talk to your doctor or pharmacist about nicotine replacement therapy, which replaces the nicotine in your body. You still get nicotine but you do not use tobacco. Nicotine replacement products help you slowly reduce the amount of nicotine you need. These products come in several forms, many of them available over-the-counter: ¨ Nicotine patches ¨ Nicotine gum and lozenges ¨ Nicotine inhaler · Ask your doctor about bupropion (Wellbutrin) or varenicline (Chantix), which are prescription medicines. They do not contain nicotine. They help you by reducing withdrawal symptoms, such as stress and anxiety. · Some people find hypnosis, acupuncture, and massage helpful for ending the smoking habit. · Eat a healthy diet and get regular exercise. Having healthy habits will help your body move past its craving for nicotine. · Be prepared to keep trying. Most people are not successful the first few times they try to quit. Do not get mad at yourself if you smoke again. Make a list of things you learned and think about when you want to try again, such as next week, next month, or next year. Where can you learn more? Go to http://evangelinaiCapital Networkanne-marie.info/. Enter A509 in the search box to learn more about \"Stopping Smoking: Care Instructions. \" Current as of: March 20, 2017 Content Version: 11.4 © 8714-8486 Healthwise, [employer]. Care instructions adapted under license by [employer]. (which disclaims liability or warranty for this information). If you have questions about a medical condition or this instruction, always ask your healthcare professional. [place] [address] any warranty or liability for your use of this information. Learning About Benefits From Quitting Smoking How does quitting smoking make you healthier? If you're thinking about quitting smoking, you may have a few reasons to be smoke-free. Your health may be one of them. · When you quit smoking, you lower your risks for cancer, lung disease, heart attack, stroke, blood vessel disease, and blindness from macular degeneration. · When you're smoke-free, you get sick less often, and you heal faster. You are less likely to get colds, flu, bronchitis, and pneumonia. · As a nonsmoker, you may find that your mood is better and you are less stressed. When and how will you feel healthier? Quitting has real health benefits that start from day 1 of being smoke-free. And the longer you stay smoke-free, the healthier you get and the better you feel. The first hours · After just 20 minutes, your blood pressure and heart rate go down. That means there's less stress on your heart and blood vessels. · Within 12 hours, the level of carbon monoxide in your blood drops back to normal. That makes room for more oxygen. With more oxygen in your body, you may notice that you have more energy than when you smoked. After 2 weeks · Your lungs start to work better. · Your risk of heart attack starts to drop. After 1 month · When your lungs are clear, you cough less and breathe deeper, so it's easier to be active. · Your sense of taste and smell return. That means you can enjoy food more than you have since you started smoking. Over the years · After 1 year, your risk of heart disease is half what it would be if you kept smoking. · After 5 years, your risk of stroke starts to shrink. Within a few years after that, it's about the same as if you'd never smoked. · After 10 years, your risk of dying from lung cancer is cut by about half. And your risk for many other types of cancer is lower too. How would quitting help others in your life? When you quit smoking, you improve the health of everyone who now breathes in your smoke. · Their heart, lung, and cancer risks drop, much like yours. · They are sick less.  For babies and small children, living smoke-free means they're less likely to have ear infections, pneumonia, and bronchitis. · If you're a woman who is or will be pregnant someday, quitting smoking means a healthier . · Children who are close to you are less likely to become adult smokers. Where can you learn more? Go to http://evangelina-anne-marie.info/. Enter [id number] [id number] [id number] in the search box to learn more about \"Learning About Benefits From Quitting Smoking. \" Current as of: 2017 Content Version: 11.4 © 7577-9427 [employer]. Care instructions adapted under license by [employer] (which disclaims liability or warranty for this information). If you have questions about a medical condition or this instruction, always ask your healthcare professional. [address] any warranty or liability for your use of this information. Marijuana Use: Care Instructions Your Care Instructions During your exam, traces of marijuana were found in your body. The active chemical in marijuana is THC. THC usually can be found in urine for a few days after marijuana is used. If use is heavy, THC may be found for weeks after use has stopped. In the [place], marijuana laws vary widely. The drug is legal in some states, mainly as a medical treatment. But marijuana possession is still a crime under federal law. Many employers have strict rules about drug use. Many do drug testing. A positive drug test might cause you to lose your job. Or it might keep you from getting hired. Follow-up care is a key part of your treatment and safety. Be sure to make and go to all appointments, and call your doctor if you are having problems. It's also a good idea to know your test results and keep a list of the medicines you take. How can you care for yourself at home? · If you use marijuana, use it safely. Do not drive or operate heavy equipment. Using marijuana may affect your judgment and coordination.  It can also increase your risk of being in a car crash. · Make sure you understand the laws in your area. Using marijuana may cause legal problems. · Know your employer's policies. When should you call for help? Watch closely for changes in your health, and contact your doctor if you think you have a problem with marijuana use. Where can you learn more? Go to http://evangelina-anne-marie.info/. Enter O393 in the search box to learn more about \"Marijuana Use: Care Instructions. \" Current as of: November 3, 2016 Content Version: 11.4 © 2324-8285 [employer]ic. Care instructions adapted under license by [employer] (which disclaims liability or warranty for this information). If you have questions about a medical condition or this instruction, always ask your healthcare professional. [address] any warranty or liability for your use of this information. Please provide this summary of care documentation to your next provider. Signatures-by signing, you are acknowledging that this After Visit Summary has been reviewed with you and you have received a copy. Patient Signature:  ____________________________________________________________ Date:  ____________________________________________________________  
  
Maged Altman Provider Signature:  ____________________________________________________________ Date:  ____________________________________________________________

## 2017-11-03 NOTE — PROGRESS NOTES
met and spoke with patient and family member in E.R.; Patients family stated, he was being admitted;  shared with patient and family and informed them that a  is always on duty and someone would be around to see him once he is admitted.

## 2017-11-04 LAB
ARTERIAL PATENCY WRIST A: YES
ATRIAL RATE: 99 BPM
BASE EXCESS BLD CALC-SCNC: 3 MMOL/L
BDY SITE: ABNORMAL
CALCULATED P AXIS, ECG09: 43 DEGREES
CALCULATED R AXIS, ECG10: 95 DEGREES
CALCULATED T AXIS, ECG11: 4 DEGREES
D DIMER PPP FEU-MCNC: <0.27 UG/ML(FEU)
DIAGNOSIS, 93000: NORMAL
EST. AVERAGE GLUCOSE BLD GHB EST-MCNC: 105 MG/DL
GAS FLOW.O2 O2 DELIVERY SYS: ABNORMAL L/MIN
HBA1C MFR BLD: 5.3 % (ref 4.2–5.6)
HCO3 BLD-SCNC: 27 MMOL/L (ref 22–26)
P-R INTERVAL, ECG05: 136 MS
PCO2 BLD: 41.1 MMHG (ref 35–45)
PH BLD: 7.43 [PH] (ref 7.35–7.45)
PO2 BLD: 79 MMHG (ref 80–100)
Q-T INTERVAL, ECG07: 322 MS
QRS DURATION, ECG06: 92 MS
QTC CALCULATION (BEZET), ECG08: 413 MS
SAO2 % BLD: 96 % (ref 92–97)
SERVICE CMNT-IMP: ABNORMAL
SPECIMEN TYPE: ABNORMAL
TROPONIN I SERPL-MCNC: <0.02 NG/ML (ref 0–0.04)
TROPONIN I SERPL-MCNC: <0.02 NG/ML (ref 0–0.04)
VENTRICULAR RATE, ECG03: 99 BPM

## 2017-11-04 PROCEDURE — 74011250636 HC RX REV CODE- 250/636: Performed by: HOSPITALIST

## 2017-11-04 PROCEDURE — 85379 FIBRIN DEGRADATION QUANT: CPT | Performed by: PHYSICIAN ASSISTANT

## 2017-11-04 PROCEDURE — 84484 ASSAY OF TROPONIN QUANT: CPT | Performed by: PHYSICIAN ASSISTANT

## 2017-11-04 PROCEDURE — 94640 AIRWAY INHALATION TREATMENT: CPT

## 2017-11-04 PROCEDURE — 65660000004 HC RM CVT STEPDOWN

## 2017-11-04 PROCEDURE — 93005 ELECTROCARDIOGRAM TRACING: CPT

## 2017-11-04 PROCEDURE — 36415 COLL VENOUS BLD VENIPUNCTURE: CPT | Performed by: PHYSICIAN ASSISTANT

## 2017-11-04 PROCEDURE — 83036 HEMOGLOBIN GLYCOSYLATED A1C: CPT | Performed by: PHYSICIAN ASSISTANT

## 2017-11-04 PROCEDURE — 74011250637 HC RX REV CODE- 250/637: Performed by: PHYSICIAN ASSISTANT

## 2017-11-04 PROCEDURE — 74011000250 HC RX REV CODE- 250: Performed by: HOSPITALIST

## 2017-11-04 PROCEDURE — 82803 BLOOD GASES ANY COMBINATION: CPT

## 2017-11-04 PROCEDURE — 36600 WITHDRAWAL OF ARTERIAL BLOOD: CPT

## 2017-11-04 RX ORDER — ACETAMINOPHEN 500 MG
500 TABLET ORAL
Status: DISCONTINUED | OUTPATIENT
Start: 2017-11-04 | End: 2017-11-06 | Stop reason: HOSPADM

## 2017-11-04 RX ADMIN — ENOXAPARIN SODIUM 40 MG: 40 INJECTION SUBCUTANEOUS at 16:28

## 2017-11-04 RX ADMIN — Medication 10 ML: at 23:41

## 2017-11-04 RX ADMIN — Medication 10 ML: at 14:00

## 2017-11-04 RX ADMIN — METHYLPREDNISOLONE SODIUM SUCCINATE 125 MG: 125 INJECTION, POWDER, FOR SOLUTION INTRAMUSCULAR; INTRAVENOUS at 05:05

## 2017-11-04 RX ADMIN — ACETAMINOPHEN 500 MG: 500 TABLET ORAL at 12:55

## 2017-11-04 RX ADMIN — ALBUTEROL SULFATE 2.5 MG: 2.5 SOLUTION RESPIRATORY (INHALATION) at 08:18

## 2017-11-04 RX ADMIN — Medication 10 ML: at 05:06

## 2017-11-04 NOTE — PROGRESS NOTES
[facility]    Patient: Carlos Brunson Age: 32 y.o. : 1990 MR#: [id number] SSN: [id number]  Date: 2017     Subjective:   Pt states he is breathing better. Reports intermittent chest pain in his right and left chest that started yesterday. Also reports a mild HA. Denies any SOB, cough, N/V/C/D, abdominal pain, and palpitations. V-tach noted on tele, asked Cardiology Dr. Sol Renee to review, states not V-tach, it is sinus tach. Assessment/Plan:   1. Acute asthma exacerbation with hypoxemic respiratory failure: Admitted with SOB with tachypnea, diaphoresis, accessory muscle usage with tachycardia. Placed on Bipap. Pt received Steroids and Duonebs and Magnesium 2g IV. No current BIPAP use, breathing comfortably. ABG results noted. Recheck ABG. Check EKG, Troponin, and D-dimer. Albuterol and Ipratropium as needed. 2. Chest pain: check D-dimer, Troponin x3. Update: neg. 3. Polydipsia and Polyuria: Check Hgb A1C.  4. Headache: pain control. Additional Notes:      Case discussed with:  [x]Patient  []Family  [x]Nursing  []Case Management  DVT Prophylaxis:  [x]Lovenox  []Hep SQ  []SCDs  []Coumadin   []On Heparin gtt    Objective:   VS:   Visit Vitals    /71    Pulse (!) 105    Temp 98.3 °F (36.8 °C)    Resp 22    Ht 6' 4\" (1.93 m)    Wt 144.5 kg (318 lb 8 oz)    SpO2 93%    BMI 38.77 kg/m2      Tmax/24hrs: Temp (24hrs), Av.9 °F (36.6 °C), Min:97.6 °F (36.4 °C), Max:98.3 °F (36.8 °C)    Intake/Output Summary (Last 24 hours) at 17 1047  Last data filed at 17 0808   Gross per 24 hour   Intake              660 ml   Output             1190 ml   Net             -530 ml       General:  Awake, alert, NAD, no accessory muscle use, non-labored breathing. Sitting comfortably in bed. Cardiovascular:  Tachycardia, regular rhythm  Pulmonary:  Mild wheezing anteriorly, CTA posteriorly.    GI:  NT, normal bowel sounds  Extremities:  No edema or cyanosis  Additional:      Labs:    Recent Results (from the past 24 hour(s))   POC G3    Collection Time: 11/03/17 11:11 AM   Result Value Ref Range    Device: BIPAP      FIO2 (POC) 40 %    pH (POC) 7.324 (L) 7.35 - 7.45      pCO2 (POC) 48.2 (H) 35.0 - 45.0 MMHG    pO2 (POC) 104 (H) 80 - 100 MMHG    HCO3 (POC) 25.1 22 - 26 MMOL/L    sO2 (POC) 97 92 - 97 %    Base deficit (POC) 2 mmol/L    PEEP/CPAP (POC) 5 cmH2O    PIP (POC) 14      Allens test (POC) YES      Total resp.  rate 17      Site LEFT RADIAL      Specimen type (POC) ARTERIAL      Performed by Elvira Read     Spontaneous timed YES         Signed By: Merline Riser, PA-C     November 4, 2017 10:47 AM

## 2017-11-05 LAB
ANION GAP SERPL CALC-SCNC: 8 MMOL/L (ref 3–18)
BASOPHILS # BLD: 0 K/UL (ref 0–0.1)
BASOPHILS NFR BLD: 0 % (ref 0–2)
BUN SERPL-MCNC: 20 MG/DL (ref 7–18)
BUN/CREAT SERPL: 19 (ref 12–20)
CALCIUM SERPL-MCNC: 9.1 MG/DL (ref 8.5–10.1)
CHLORIDE SERPL-SCNC: 107 MMOL/L (ref 100–108)
CO2 SERPL-SCNC: 28 MMOL/L (ref 21–32)
CREAT SERPL-MCNC: 1.06 MG/DL (ref 0.6–1.3)
DIFFERENTIAL METHOD BLD: ABNORMAL
EOSINOPHIL # BLD: 0 K/UL (ref 0–0.4)
EOSINOPHIL NFR BLD: 0 % (ref 0–5)
ERYTHROCYTE [DISTWIDTH] IN BLOOD BY AUTOMATED COUNT: 15.4 % (ref 11.6–14.5)
GLUCOSE SERPL-MCNC: 92 MG/DL (ref 74–99)
HCT VFR BLD AUTO: 46.9 % (ref 36–48)
HGB BLD-MCNC: 16.1 G/DL (ref 13–16)
LYMPHOCYTES # BLD: 5 K/UL (ref 0.9–3.6)
LYMPHOCYTES NFR BLD: 32 % (ref 21–52)
MCH RBC QN AUTO: 28.2 PG (ref 24–34)
MCHC RBC AUTO-ENTMCNC: 34.3 G/DL (ref 31–37)
MCV RBC AUTO: 82.3 FL (ref 74–97)
MONOCYTES # BLD: 1.3 K/UL (ref 0.05–1.2)
MONOCYTES NFR BLD: 8 % (ref 3–10)
NEUTS SEG # BLD: 9.2 K/UL (ref 1.8–8)
NEUTS SEG NFR BLD: 60 % (ref 40–73)
PLATELET # BLD AUTO: 225 K/UL (ref 135–420)
PMV BLD AUTO: 10.9 FL (ref 9.2–11.8)
POTASSIUM SERPL-SCNC: 4.4 MMOL/L (ref 3.5–5.5)
RBC # BLD AUTO: 5.7 M/UL (ref 4.7–5.5)
SODIUM SERPL-SCNC: 143 MMOL/L (ref 136–145)
WBC # BLD AUTO: 15.6 K/UL (ref 4.6–13.2)

## 2017-11-05 PROCEDURE — 65660000004 HC RM CVT STEPDOWN

## 2017-11-05 PROCEDURE — 74011636637 HC RX REV CODE- 636/637: Performed by: PHYSICIAN ASSISTANT

## 2017-11-05 PROCEDURE — 94640 AIRWAY INHALATION TREATMENT: CPT

## 2017-11-05 PROCEDURE — 74011000250 HC RX REV CODE- 250: Performed by: EMERGENCY MEDICINE

## 2017-11-05 PROCEDURE — 94660 CPAP INITIATION&MGMT: CPT

## 2017-11-05 PROCEDURE — 74011000250 HC RX REV CODE- 250: Performed by: HOSPITALIST

## 2017-11-05 PROCEDURE — 85025 COMPLETE CBC W/AUTO DIFF WBC: CPT | Performed by: PHYSICIAN ASSISTANT

## 2017-11-05 PROCEDURE — 74011250637 HC RX REV CODE- 250/637: Performed by: EMERGENCY MEDICINE

## 2017-11-05 PROCEDURE — 74011250636 HC RX REV CODE- 250/636: Performed by: HOSPITALIST

## 2017-11-05 PROCEDURE — 36415 COLL VENOUS BLD VENIPUNCTURE: CPT | Performed by: PHYSICIAN ASSISTANT

## 2017-11-05 PROCEDURE — 80048 BASIC METABOLIC PNL TOTAL CA: CPT | Performed by: PHYSICIAN ASSISTANT

## 2017-11-05 RX ORDER — AZITHROMYCIN 250 MG/1
500 TABLET, FILM COATED ORAL EVERY 24 HOURS
Status: DISCONTINUED | OUTPATIENT
Start: 2017-11-05 | End: 2017-11-06 | Stop reason: HOSPADM

## 2017-11-05 RX ORDER — IPRATROPIUM BROMIDE AND ALBUTEROL SULFATE 2.5; .5 MG/3ML; MG/3ML
3 SOLUTION RESPIRATORY (INHALATION)
Status: DISCONTINUED | OUTPATIENT
Start: 2017-11-05 | End: 2017-11-06 | Stop reason: HOSPADM

## 2017-11-05 RX ORDER — PREDNISONE 20 MG/1
40 TABLET ORAL
Status: DISCONTINUED | OUTPATIENT
Start: 2017-11-05 | End: 2017-11-06 | Stop reason: HOSPADM

## 2017-11-05 RX ORDER — GUAIFENESIN 600 MG/1
600 TABLET, EXTENDED RELEASE ORAL 2 TIMES DAILY
Status: DISCONTINUED | OUTPATIENT
Start: 2017-11-05 | End: 2017-11-06 | Stop reason: HOSPADM

## 2017-11-05 RX ADMIN — ENOXAPARIN SODIUM 40 MG: 40 INJECTION SUBCUTANEOUS at 17:30

## 2017-11-05 RX ADMIN — ALBUTEROL SULFATE 2.5 MG: 2.5 SOLUTION RESPIRATORY (INHALATION) at 15:34

## 2017-11-05 RX ADMIN — GUAIFENESIN 600 MG: 600 TABLET, EXTENDED RELEASE ORAL at 20:14

## 2017-11-05 RX ADMIN — Medication 10 ML: at 17:34

## 2017-11-05 RX ADMIN — AZITHROMYCIN 500 MG: 250 TABLET, FILM COATED ORAL at 20:14

## 2017-11-05 RX ADMIN — Medication 10 ML: at 05:08

## 2017-11-05 RX ADMIN — IPRATROPIUM BROMIDE AND ALBUTEROL SULFATE 3 ML: 2.5; .5 SOLUTION RESPIRATORY (INHALATION) at 19:46

## 2017-11-05 RX ADMIN — PREDNISONE 40 MG: 20 TABLET ORAL at 10:52

## 2017-11-05 RX ADMIN — Medication 10 ML: at 23:41

## 2017-11-05 NOTE — PROGRESS NOTES
.  Respiratory Care Assessment for Bronchial hygiene or Lung Expansion Therapy  Patient  Carlos Brunson     32 y.o.   male     11/5/2017  5:50 PM  Patient Active Problem List   Diagnosis Code    Status asthmaticus, non-allergic J45.22       ABG:  Date:11/5/2017  No results found for: PH, PHI, PCO2, PCO2I, PO2, PO2I, HCO3, HCO3I, FIO2, FIO2I    Chest X-ray:  Date:11/5/2017    Results from Hospital Encounter encounter on 11/03/17   XR CHEST PORT   Narrative EXAM: Chest Radiograph    INDICATION: Shortness of breath    TECHNIQUE: AP view of the chest    COMPARISON: 2/1/2013 and 5/9/2012    FINDINGS: No pneumothorax identified. The lungs are clear. No infiltrates  appreciated. No effusions identified. The cardiomediastinal silhouette is  unremarkable. The pulmonary vasculature is unremarkable. The osseous  structures are unremarkable. Impression Impression:  1. No acute cardiopulmonary process.           Lab Test:  Date:11/5/2017  WBC:   Lab Results   Component Value Date/Time    WBC 15.6 11/05/2017 02:27 AM   HGB: Lab Results   Component Value Date/Time    HGB 16.1 11/05/2017 02:27 AM    PLTS: Lab Results   Component Value Date/Time    PLATELET 392 01/69/7315 02:27 AM       SaO2%/flow:   SpO2 Readings from Last 1 Encounters:   11/05/17 99%       Vital Signs:   Patient Vitals for the past 8 hrs:   Temp Pulse Resp BP SpO2   11/05/17 1616 98 °F (36.7 °C) 83 20 119/80 99 %   11/05/17 1104 98.2 °F (36.8 °C) 79 20 127/76 98 %         RA/O2 flow/device: 2 LPM      First Inital Assesment:     [x]Yes []No   Reevaluation/Reassessment:    []Yes []No     CHART REVIEW   Points 0 X 1 X 2 X 3 X 4 X Points   Pulmonary History Smoking History (1) none  Recent Smoking History <1 PPD  Recent Smoking History >1 PPD  Pulmonary Disease or Impairment  Severe Pulmonary Disease  3   Surgical History No Surgery  General Surgery  Lower Abdominal  Thoracic or Upper Abdominal  Thoracic & Pulmonary Disease  0   CXR Clear or not indicated  Chronic changes or CXR Pending  Infiltrate, atelectasis or pleural effusions  Infiltrates in more than 1lobe  Infiltrates +atelectasis  +/or pleural effusions  0     PATIENT ASSESSMENT    0 X 1 X 2 X 3 X 4 X Points   Respiratory Pattern Regular pattern RR 12-20  Increased RR 21-27   Mild Dyspnea at rest, irregular pattern RR 28-32  Moderate Dyspnea at rest, Use of accessory muscles, RR 33-36  Severe Dyspnea, Use of accessory muscles RR >36  0   Mental Status Alert Oriented cooperative  Confused, Follows commands  Lethargic, Does not follow commands  Obtunded  Unresponsive  0   Breath Sounds Clear  Decreased Unilaterally  Decreased Bilaterally  Mild Scattered wheezing or Crackles in bases  Severe Wheezing or rhonchi  1   Cough Strong dry NPC  Strong Productive  Weak NPC  Weak productive or weak with rhonchi  No cough or may require suctioning  0   Level of Activity Ambulatory  Ambulatory with assistance  Temporarily Non-ambulatory  Non-Ambulatory, able to position self  Unable to position self, confined to bed  1   Total Points/Score:   5     Specific Intervention Chart()    Bronchial Hygiene/Secretion Clearance:    []EZPAP []Rotation bed with vibration    []CPT with percussor []CPT via vest   []Oscillastiang positive pressure expiratory device      Lung Expansion:    []Incentive Spirometer w/RT visits []Incentive Spirometer w/nursing    []EZPAP      *Suctioning:    []Nasal Tracheal []Tracheal     *suctioning will be ordered and done PRN with an associated frequency such as QID/PRN based on score       Other:    Care Plan   Level # Score Modality Frequency Comment   Level 1 >17      Level 2 14-17      Level 3 10-13      Level 4 1-9        BRONCHIAL HYGIENE SCORING AND FREQUENCY GUIDELINES   Frequency Indications/Findings Level #   Q4 ATC Copious secretions, SOB, unable to sleep 1   QID & PRN at night Moderate amounts of secretions 2   TID or Q6 wa Small amounts of secretions and poor cough: recent history of secretions 3   BID or Q8 wa Unable to deep breathe and cough effectively 4        Comments:      Respiratory Therapist: Jewell Boggs, RT

## 2017-11-05 NOTE — PROGRESS NOTES
[facility]  Progress Note    Patient: Nirmal Olsen Age: 32 y.o. : 1990 MR#: [id number] SSN: [id number]  Date: 2017     Subjective:   Pt states his breathing is better, but he had to use his BIPAP overnight for several hours due to SOB. Still reports mild intermittent chest pain in his right and left chest. Currently denies any SOB, chest pain, N/D//V/C. Assessment/Plan:   1. Acute asthma exacerbation with hypoxemic respiratory failure: Admitted with SOB with tachypnea, diaphoresis, accessory muscle usage with tachycardia. Placed on Bipap. Pt received Steroids and Duonebs and Magnesium 2g IV. No current BIPAP use, breathing comfortably. ABG results noted. Troponin, and D-dimer were wnl. Albuterol and Ipratropium as needed. Start prednisone 40mg PO.   2. Chest pain: D-dimer, Troponin x3 wnl. 3. Polydipsia and Polyuria: improved, Check Hgb A1C wnl.  4. Headache: pain control. 5. Mild Leukocytosis: noted, likely due to steroids.      Additional Notes:      Case discussed with:  [x]Patient  []Family  [x]Nursing  []Case Management  DVT Prophylaxis:  [x]Lovenox  []Hep SQ  []SCDs  []Coumadin   []On Heparin gtt    Objective:   VS:   Visit Vitals    /76    Pulse 79    Temp 98.2 °F (36.8 °C)    Resp 20    Ht 6' 4\" (1.93 m)    Wt 144 kg (317 lb 6.4 oz)    SpO2 98%    BMI 38.64 kg/m2      Tmax/24hrs: Temp (24hrs), Av °F (36.7 °C), Min:97.4 °F (36.3 °C), Max:98.4 °F (36.9 °C)    Intake/Output Summary (Last 24 hours) at 17 1209  Last data filed at 17 1028   Gross per 24 hour   Intake             1080 ml   Output              400 ml   Net              680 ml       General:  Awake, alert, NAD  Cardiovascular:  RRR  Pulmonary:  CTA  GI:  NT, normal bowel sounds  Extremities:  No edema or cyanosis  Additional:      Labs:    Recent Results (from the past 24 hour(s))   D DIMER    Collection Time: 17 12:20 PM   Result Value Ref Range D DIMER <0.27 <0.46 ug/ml(FEU)   TROPONIN I    Collection Time: 11/04/17 12:20 PM   Result Value Ref Range    Troponin-I, Qt. <0.02 0.0 - 0.045 NG/ML   HEMOGLOBIN A1C WITH EAG    Collection Time: 11/04/17 12:20 PM   Result Value Ref Range    Hemoglobin A1c 5.3 4.2 - 5.6 %    Est. average glucose 105 mg/dL   TROPONIN I    Collection Time: 11/04/17 10:45 PM   Result Value Ref Range    Troponin-I, Qt. <0.02 0.0 - 0.045 NG/ML   CBC WITH AUTOMATED DIFF    Collection Time: 11/05/17  2:27 AM   Result Value Ref Range    WBC 15.6 (H) 4.6 - 13.2 K/uL    RBC 5.70 (H) 4.70 - 5.50 M/uL    HGB 16.1 (H) 13.0 - 16.0 g/dL    HCT 46.9 36.0 - 48.0 %    MCV 82.3 74.0 - 97.0 FL    MCH 28.2 24.0 - 34.0 PG    MCHC 34.3 31.0 - 37.0 g/dL    RDW 15.4 (H) 11.6 - 14.5 %    PLATELET 208 100 - 454 K/uL    MPV 10.9 9.2 - 11.8 FL    NEUTROPHILS 60 40 - 73 %    LYMPHOCYTES 32 21 - 52 %    MONOCYTES 8 3 - 10 %    EOSINOPHILS 0 0 - 5 %    BASOPHILS 0 0 - 2 %    ABS. NEUTROPHILS 9.2 (H) 1.8 - 8.0 K/UL    ABS. LYMPHOCYTES 5.0 (H) 0.9 - 3.6 K/UL    ABS. MONOCYTES 1.3 (H) 0.05 - 1.2 K/UL    ABS. EOSINOPHILS 0.0 0.0 - 0.4 K/UL    ABS.  BASOPHILS 0.0 0.0 - 0.1 K/UL    DF AUTOMATED     METABOLIC PANEL, BASIC    Collection Time: 11/05/17  2:27 AM   Result Value Ref Range    Sodium 143 136 - 145 mmol/L    Potassium 4.4 3.5 - 5.5 mmol/L    Chloride 107 100 - 108 mmol/L    CO2 28 21 - 32 mmol/L    Anion gap 8 3.0 - 18 mmol/L    Glucose 92 74 - 99 mg/dL    BUN 20 (H) 7.0 - 18 MG/DL    Creatinine 1.06 0.6 - 1.3 MG/DL    BUN/Creatinine ratio 19 12 - 20      GFR est AA >60 >60 ml/min/1.73m2    GFR est non-AA >60 >60 ml/min/1.73m2    Calcium 9.1 8.5 - 10.1 MG/DL       Signed By: Oumou Hines PA-C     November 5, 2017 12:09 PM

## 2017-11-05 NOTE — PROGRESS NOTES
The end of Daylight Saving Time occurred at 0200 hrs. Documentation of patient care and medications administered is done with respect to the time change.

## 2017-11-05 NOTE — ROUTINE PROCESS
Bedside and Verbal shift change report given to [address] [place] [address] (oncoming nurse) by Johnny Oscar RN (offgoing nurse). Report included the following information Kardex, Procedure Summary and Recent Results.

## 2017-11-06 VITALS
RESPIRATION RATE: 20 BRPM | BODY MASS INDEX: 38.36 KG/M2 | WEIGHT: 315 LBS | SYSTOLIC BLOOD PRESSURE: 144 MMHG | OXYGEN SATURATION: 98 % | HEART RATE: 104 BPM | TEMPERATURE: 97.9 F | HEIGHT: 76 IN | DIASTOLIC BLOOD PRESSURE: 99 MMHG

## 2017-11-06 LAB
ANION GAP SERPL CALC-SCNC: 5 MMOL/L (ref 3–18)
BASOPHILS # BLD: 0 K/UL (ref 0–0.06)
BASOPHILS NFR BLD: 0 % (ref 0–3)
BUN SERPL-MCNC: 22 MG/DL (ref 7–18)
BUN/CREAT SERPL: 19 (ref 12–20)
CALCIUM SERPL-MCNC: 8.6 MG/DL (ref 8.5–10.1)
CHLORIDE SERPL-SCNC: 106 MMOL/L (ref 100–108)
CO2 SERPL-SCNC: 31 MMOL/L (ref 21–32)
CREAT SERPL-MCNC: 1.13 MG/DL (ref 0.6–1.3)
DIFFERENTIAL METHOD BLD: ABNORMAL
EOSINOPHIL # BLD: 0.1 K/UL (ref 0–0.4)
EOSINOPHIL NFR BLD: 1 % (ref 0–5)
ERYTHROCYTE [DISTWIDTH] IN BLOOD BY AUTOMATED COUNT: 15.2 % (ref 11.6–14.5)
GLUCOSE SERPL-MCNC: 87 MG/DL (ref 74–99)
HCT VFR BLD AUTO: 45.1 % (ref 36–48)
HGB BLD-MCNC: 15.4 G/DL (ref 13–16)
LYMPHOCYTES # BLD: 6.5 K/UL (ref 0.8–3.5)
LYMPHOCYTES NFR BLD: 47 % (ref 20–51)
MCH RBC QN AUTO: 28.2 PG (ref 24–34)
MCHC RBC AUTO-ENTMCNC: 34.1 G/DL (ref 31–37)
MCV RBC AUTO: 82.6 FL (ref 74–97)
MONOCYTES # BLD: 1.5 K/UL (ref 0–1)
MONOCYTES NFR BLD: 11 % (ref 2–9)
NEUTS SEG # BLD: 5.7 K/UL (ref 1.8–8)
NEUTS SEG NFR BLD: 41 % (ref 42–75)
PLATELET # BLD AUTO: 229 K/UL (ref 135–420)
PLATELET COMMENTS,PCOM: ABNORMAL
PMV BLD AUTO: 10.6 FL (ref 9.2–11.8)
POTASSIUM SERPL-SCNC: 4.3 MMOL/L (ref 3.5–5.5)
RBC # BLD AUTO: 5.46 M/UL (ref 4.7–5.5)
RBC MORPH BLD: ABNORMAL
SODIUM SERPL-SCNC: 142 MMOL/L (ref 136–145)
WBC # BLD AUTO: 13.8 K/UL (ref 4.6–13.2)
WBC MORPH BLD: ABNORMAL

## 2017-11-06 PROCEDURE — 74011000250 HC RX REV CODE- 250: Performed by: EMERGENCY MEDICINE

## 2017-11-06 PROCEDURE — 36415 COLL VENOUS BLD VENIPUNCTURE: CPT | Performed by: EMERGENCY MEDICINE

## 2017-11-06 PROCEDURE — 94660 CPAP INITIATION&MGMT: CPT

## 2017-11-06 PROCEDURE — 94640 AIRWAY INHALATION TREATMENT: CPT

## 2017-11-06 PROCEDURE — 85025 COMPLETE CBC W/AUTO DIFF WBC: CPT | Performed by: EMERGENCY MEDICINE

## 2017-11-06 PROCEDURE — 74011636637 HC RX REV CODE- 636/637: Performed by: PHYSICIAN ASSISTANT

## 2017-11-06 PROCEDURE — 74011250637 HC RX REV CODE- 250/637: Performed by: EMERGENCY MEDICINE

## 2017-11-06 PROCEDURE — 80048 BASIC METABOLIC PNL TOTAL CA: CPT | Performed by: EMERGENCY MEDICINE

## 2017-11-06 RX ORDER — AZITHROMYCIN 250 MG/1
250 TABLET, FILM COATED ORAL DAILY
Qty: 5 TAB | Refills: 0 | Status: SHIPPED | OUTPATIENT
Start: 2017-11-06 | End: 2017-11-14

## 2017-11-06 RX ORDER — PREDNISONE 10 MG/1
10 TABLET ORAL
Qty: 3 TAB | Refills: 0 | Status: SHIPPED | OUTPATIENT
Start: 2017-11-06 | End: 2017-11-09

## 2017-11-06 RX ORDER — PREDNISONE 20 MG/1
20 TABLET ORAL
Qty: 3 TAB | Refills: 0 | Status: SHIPPED | OUTPATIENT
Start: 2017-11-06 | End: 2017-11-09

## 2017-11-06 RX ORDER — GUAIFENESIN 600 MG/1
600 TABLET, EXTENDED RELEASE ORAL 2 TIMES DAILY
Qty: 10 TAB | Refills: 0 | Status: SHIPPED | OUTPATIENT
Start: 2017-11-06 | End: 2018-04-23

## 2017-11-06 RX ORDER — PREDNISONE 20 MG/1
40 TABLET ORAL
Qty: 6 TAB | Refills: 0 | Status: SHIPPED | OUTPATIENT
Start: 2017-11-06 | End: 2017-11-14

## 2017-11-06 RX ADMIN — PREDNISONE 40 MG: 20 TABLET ORAL at 11:43

## 2017-11-06 RX ADMIN — Medication 10 ML: at 05:49

## 2017-11-06 RX ADMIN — GUAIFENESIN 600 MG: 600 TABLET, EXTENDED RELEASE ORAL at 11:43

## 2017-11-06 RX ADMIN — IPRATROPIUM BROMIDE AND ALBUTEROL SULFATE 3 ML: 2.5; .5 SOLUTION RESPIRATORY (INHALATION) at 01:02

## 2017-11-06 RX ADMIN — IPRATROPIUM BROMIDE AND ALBUTEROL SULFATE 3 ML: 2.5; .5 SOLUTION RESPIRATORY (INHALATION) at 08:45

## 2017-11-06 RX ADMIN — IPRATROPIUM BROMIDE AND ALBUTEROL SULFATE 3 ML: 2.5; .5 SOLUTION RESPIRATORY (INHALATION) at 13:28

## 2017-11-06 NOTE — PROGRESS NOTES
[facility]  Progress Note    Patient: Carlos Brunson Age: 32 y.o. : 1990 MR#: [id number] SSN: [id number]  Date: 2017     Subjective:     Pt states he is feeling well. Did not use BIPAP overnight, and his breathing is better. He does report intermittent productive cough. Denies any chest pain, abd pain, N/V/C/D. Assessment/Plan:   1. Acute asthma exacerbation with hypoxemic respiratory failure: Admitted with SOB with tachypnea, diaphoresis, accessory muscle usage with tachycardia. Placed on Bipap. Pt received Steroids and Duonebs and Magnesium 2g IV. No current BIPAP use, breathing comfortably. ABG results noted. Troponin, and D-dimer were wnl. Albuterol and Ipratropium as needed. Continue prednisone 40mg PO, may taper on discharge. Continue zithromax and mucinex for productive cough. Continue Duonebs. Check sputum cx.   2. Chest pain: D-dimer, Troponin x3 wnl. 3. Polydipsia and Polyuria: improved, Check Hgb A1C wnl.  4. Headache: pain control. 5. Mild Leukocytosis: noted, likely due to steroids. Improved. Additional Notes:      Case discussed with:  [x]Patient  []Family  [x]Nursing  []Case Management  DVT Prophylaxis:  [x]Lovenox  []Hep SQ  []SCDs  []Coumadin   []On Heparin gtt    Objective:   VS:   Visit Vitals    /80    Pulse 83    Temp 97.2 °F (36.2 °C)    Resp 20    Ht 6' 4\" (1.93 m)    Wt 144 kg (317 lb 7.4 oz)    SpO2 98%    BMI 38.64 kg/m2      Tmax/24hrs: Temp (24hrs), Av.8 °F (36.6 °C), Min:97.2 °F (36.2 °C), Max:98.2 °F (36.8 °C)    Intake/Output Summary (Last 24 hours) at 17 0838  Last data filed at 17 4124   Gross per 24 hour   Intake             2760 ml   Output              500 ml   Net             2260 ml       General:  Awake, alert, NAD, walked around hallway without distress, or labored breathing.   Cardiovascular:  RRR  Pulmonary:  Mild wheezing bilat: improved  GI:  NT, normal bowel sounds  Extremities:  No edema or cyanosis  Additional:      Labs:    Recent Results (from the past 24 hour(s))   CBC WITH AUTOMATED DIFF    Collection Time: 11/06/17  5:40 AM   Result Value Ref Range    WBC 13.8 (H) 4.6 - 13.2 K/uL    RBC 5.46 4.70 - 5.50 M/uL    HGB 15.4 13.0 - 16.0 g/dL    HCT 45.1 36.0 - 48.0 %    MCV 82.6 74.0 - 97.0 FL    MCH 28.2 24.0 - 34.0 PG    MCHC 34.1 31.0 - 37.0 g/dL    RDW 15.2 (H) 11.6 - 14.5 %    PLATELET 837 654 - 747 K/uL    MPV 10.6 9.2 - 11.8 FL    NEUTROPHILS 41 (L) 42 - 75 %    LYMPHOCYTES 47 20 - 51 %    MONOCYTES 11 (H) 2 - 9 %    EOSINOPHILS 1 0 - 5 %    BASOPHILS 0 0 - 3 %    ABS. NEUTROPHILS 5.7 1.8 - 8.0 K/UL    ABS. LYMPHOCYTES 6.5 (H) 0.8 - 3.5 K/UL    ABS. MONOCYTES 1.5 (H) 0 - 1.0 K/UL    ABS. EOSINOPHILS 0.1 0.0 - 0.4 K/UL    ABS.  BASOPHILS 0.0 0.0 - 0.06 K/UL    DF MANUAL      PLATELET COMMENTS ADEQUATE PLATELETS      RBC COMMENTS TARGET CELLS  2+        RBC COMMENTS ANISOCYTOSIS  1+        RBC COMMENTS STOMATOCYTES  1+        RBC COMMENTS POLYCHROMASIA  1+        WBC COMMENTS ATYPICAL LYMPHOCYTES PRESENT     METABOLIC PANEL, BASIC    Collection Time: 11/06/17  5:40 AM   Result Value Ref Range    Sodium 142 136 - 145 mmol/L    Potassium 4.3 3.5 - 5.5 mmol/L    Chloride 106 100 - 108 mmol/L    CO2 31 21 - 32 mmol/L    Anion gap 5 3.0 - 18 mmol/L    Glucose 87 74 - 99 mg/dL    BUN 22 (H) 7.0 - 18 MG/DL    Creatinine 1.13 0.6 - 1.3 MG/DL    BUN/Creatinine ratio 19 12 - 20      GFR est AA >60 >60 ml/min/1.73m2    GFR est non-AA >60 >60 ml/min/1.73m2    Calcium 8.6 8.5 - 10.1 MG/DL       Signed By: XOCHILT Lepe 6, 2017 8:38 AM

## 2017-11-06 NOTE — DISCHARGE INSTRUCTIONS
Patient armband removed and shredded    Take medication as directed.  If unable to keep follow up appointment please call and inform office at least 24hours prior to appointment

## 2017-11-06 NOTE — PROGRESS NOTES
[address]      11/6/2017      RE: Asher Dewey      To Whom it May Concern: This is to certify that Asher Dewey may return to work on 11/8/17. Patient was hospitalized from 11/3/17 - 11/6/17. Please feel free to contact my office if you have any questions or concerns. Thank you for your assistance in this matter.     Sincerely,      Marcus Sanchez PA-C

## 2017-11-06 NOTE — DISCHARGE SUMMARY
[facility]  Discharge Summary       Patient: Alex Herrera Age: 32 y.o. : 1990 MR#: [id number] [id number]: [id number]  PCP on record: Afshan Carrington MD  Admit date: 11/3/2017  Discharge date: 2017    Disposition:    [x]Home   []Home with Home Health   []SNF/NH   []Rehab   []Home with family   []Alternate Facility:____________________    Discharge Diagnoses:                             1. Acute asthma exacerbation with hypoxemic respiratory failure  2. Chest pain  3. Polydipsia and Polyuria  4. Headache. 5. Mild Leukocytosis  6. Tobacco abuse  7. Ilicit drug use: cocaine    Discharge Medications:     Current Discharge Medication List      START taking these medications    Details   azithromycin (ZITHROMAX) 250 mg tablet Take 1 Tab by mouth daily for 5 days. Qty: 5 Tab, Refills: 0      guaiFENesin ER (MUCINEX) 600 mg ER tablet Take 1 Tab by mouth two (2) times a day. Qty: 10 Tab, Refills: 0      !! predniSONE (DELTASONE) 20 mg tablet Take 2 Tabs by mouth daily (with breakfast). For 3 days. Qty: 6 Tab, Refills: 0      !! predniSONE (DELTASONE) 20 mg tablet Take 1 Tab by mouth daily (with breakfast) for 3 days. Qty: 3 Tab, Refills: 0      !! predniSONE (DELTASONE) 10 mg tablet Take 1 Tab by mouth daily (with breakfast) for 3 days. Qty: 3 Tab, Refills: 0      ipratropium-albuterol (COMBIVENT RESPIMAT)  mcg/actuation inhaler Take 1 Puff by inhalation every six (6) hours. Qty: 1 Inhaler, Refills: 0       !! - Potential duplicate medications found. Please discuss with provider. Consults: none  -   Procedures: none  -     Significant Diagnostic Studies: CXR, EKG  -      Hospital Course by Problem   1. Acute asthma exacerbation with hypoxemic respiratory failure: Admitted with SOB with tachypnea, diaphoresis, accessory muscle usage with tachycardia. Placed on Bipap. Pt received Steroids and Duonebs and Magnesium 2g IV. ABG results noted.  Troponin, and D-dimer were wnl. Albuterol and Ipratropium as needed. Continue prednisone 40mg PO, taper on discharge. Managed with zithromax and mucinex for productive cough. Continued on Duonebs. Sputum cx collected. No current BIPAP use, breathing comfortably, walked the hallway several times with no distress or labored breathing. Indigent meds received. 2. Chest pain: D-dimer, Troponin x3 wnl. Resolution of symptoms before discharge  3. Polydipsia and Polyuria: improved, Check Hgb A1C wnl.  4. Headache: Managed with pain control as needed. 5. Mild Leukocytosis: noted, likely due to steroids. Improved before discharge. 6. Tobacco abuse: counseled on tobacco cessation  7. Ilicit drug use: cocaine: counseled on illicit drug use cessation. Today's examination of the patient revealed:     Subjective:   Pt states he feels well today, did not use Bipap overnight. Denies any chest pain, SOB, N/V/D/C, fevers or chills.    Objective:   VS:   Visit Vitals    /72 (BP 1 Location: Left arm, BP Patient Position: At rest)    Pulse 92    Temp 98.1 °F (36.7 °C)    Resp 20    Ht 6' 4\" (1.93 m)    Wt 144 kg (317 lb 7.4 oz)    SpO2 94%  Comment: room air    BMI 38.64 kg/m2      Tmax/24hrs: Temp (24hrs), Av.9 °F (36.6 °C), Min:97.2 °F (36.2 °C), Max:98.2 °F (36.8 °C)     Input/Output:   Intake/Output Summary (Last 24 hours) at 17 1038  Last data filed at 17 4386   Gross per 24 hour   Intake             2160 ml   Output              500 ml   Net             1660 ml       General:  Alert, NAD  Cardiovascular:  RRR  Pulmonary:  LSC throughout; respiratory effort WNL  GI:  +BS in all four quadrants, soft, non-tender  Extremities:  No edema; or cyanosis  Additional:      Labs:    Recent Results (from the past 24 hour(s))   CBC WITH AUTOMATED DIFF    Collection Time: 17  5:40 AM   Result Value Ref Range    WBC 13.8 (H) 4.6 - 13.2 K/uL    RBC 5.46 4.70 - 5.50 M/uL    HGB 15.4 13.0 - 16.0 g/dL    HCT 45.1 36.0 - 48.0 %    MCV 82.6 74.0 - 97.0 FL    MCH 28.2 24.0 - 34.0 PG    MCHC 34.1 31.0 - 37.0 g/dL    RDW 15.2 (H) 11.6 - 14.5 %    PLATELET 617 777 - 299 K/uL    MPV 10.6 9.2 - 11.8 FL    NEUTROPHILS 41 (L) 42 - 75 %    LYMPHOCYTES 47 20 - 51 %    MONOCYTES 11 (H) 2 - 9 %    EOSINOPHILS 1 0 - 5 %    BASOPHILS 0 0 - 3 %    ABS. NEUTROPHILS 5.7 1.8 - 8.0 K/UL    ABS. LYMPHOCYTES 6.5 (H) 0.8 - 3.5 K/UL    ABS. MONOCYTES 1.5 (H) 0 - 1.0 K/UL    ABS. EOSINOPHILS 0.1 0.0 - 0.4 K/UL    ABS. BASOPHILS 0.0 0.0 - 0.06 K/UL    DF MANUAL      PLATELET COMMENTS ADEQUATE PLATELETS      RBC COMMENTS TARGET CELLS  2+        RBC COMMENTS ANISOCYTOSIS  1+        RBC COMMENTS STOMATOCYTES  1+        RBC COMMENTS POLYCHROMASIA  1+        WBC COMMENTS ATYPICAL LYMPHOCYTES PRESENT     METABOLIC PANEL, BASIC    Collection Time: 17  5:40 AM   Result Value Ref Range    Sodium 142 136 - 145 mmol/L    Potassium 4.3 3.5 - 5.5 mmol/L    Chloride 106 100 - 108 mmol/L    CO2 31 21 - 32 mmol/L    Anion gap 5 3.0 - 18 mmol/L    Glucose 87 74 - 99 mg/dL    BUN 22 (H) 7.0 - 18 MG/DL    Creatinine 1.13 0.6 - 1.3 MG/DL    BUN/Creatinine ratio 19 12 - 20      GFR est AA >60 >60 ml/min/1.73m2    GFR est non-AA >60 >60 ml/min/1.73m2    Calcium 8.6 8.5 - 10.1 MG/DL     Additional Data Reviewed:     Condition:   Follow-up Appointments:   1. Your PCP: Paddy Galvan MD, within 7-10days  2. Your Pulmonologist: Joselyn Mullins MD within 7-10 days    Please follow-up on tests/labs that are still pendin. Sputum Culture.      >30 minutes spent coordinating this discharge (review instructions/follow-up, prescriptions, preparing report for sign off)    Signed:  Tanisha Landaverde PA-C  2017  10:38 AM

## 2017-11-06 NOTE — PROGRESS NOTES
Care Management Interventions  PCP Verified by CM: Yes  Current Support Network: Other  Confirm Follow Up Transport: Family  Plan discussed with Pt/Family/Caregiver: Yes     Pt is a 32year old male admitted for status asthmaticus, non allergic. Pt is alert and oriented in room with his significant other at his bedside. Pt indicates being independent with ADLs and ambulation. His plan is to return home and he is currently uninsured. Pt's home address is [address] [place] [address]. He is totally independent with ADLs and ambulation. His friend will be assisting him with transportation home. Pt is being assisted with indigent meds.

## 2017-11-10 ENCOUNTER — HOSPITAL ENCOUNTER (EMERGENCY)
Age: 27
Discharge: HOME | End: 2017-11-11
Attending: EMERGENCY MEDICINE | Admitting: EMERGENCY MEDICINE
Payer: SELF-PAY

## 2017-11-10 DIAGNOSIS — M79.662 PAIN OF LEFT CALF: Primary | ICD-10-CM

## 2017-11-10 LAB
ALBUMIN SERPL-MCNC: 3 G/DL (ref 3.4–5)
ALBUMIN/GLOB SERPL: 0.9 {RATIO} (ref 0.8–1.7)
ALP SERPL-CCNC: 47 U/L (ref 45–117)
ALT SERPL-CCNC: 23 U/L (ref 16–61)
ANION GAP SERPL CALC-SCNC: 6 MMOL/L (ref 3–18)
AST SERPL-CCNC: 11 U/L (ref 15–37)
BASOPHILS # BLD: 0 K/UL (ref 0–0.06)
BASOPHILS NFR BLD: 0 % (ref 0–2)
BILIRUB SERPL-MCNC: 0.4 MG/DL (ref 0.2–1)
BUN SERPL-MCNC: 18 MG/DL (ref 7–18)
BUN/CREAT SERPL: 14 (ref 12–20)
CALCIUM SERPL-MCNC: 8.2 MG/DL (ref 8.5–10.1)
CHLORIDE SERPL-SCNC: 108 MMOL/L (ref 100–108)
CO2 SERPL-SCNC: 27 MMOL/L (ref 21–32)
CREAT SERPL-MCNC: 1.26 MG/DL (ref 0.6–1.3)
D DIMER PPP FEU-MCNC: 1.14 UG/ML(FEU)
DIFFERENTIAL METHOD BLD: ABNORMAL
EOSINOPHIL # BLD: 0.4 K/UL (ref 0–0.4)
EOSINOPHIL NFR BLD: 4 % (ref 0–5)
ERYTHROCYTE [DISTWIDTH] IN BLOOD BY AUTOMATED COUNT: 14.9 % (ref 11.6–14.5)
GLOBULIN SER CALC-MCNC: 3.4 G/DL (ref 2–4)
GLUCOSE SERPL-MCNC: 106 MG/DL (ref 74–99)
HCT VFR BLD AUTO: 43.3 % (ref 36–48)
HGB BLD-MCNC: 15.3 G/DL (ref 13–16)
LYMPHOCYTES # BLD: 4.7 K/UL (ref 0.9–3.6)
LYMPHOCYTES NFR BLD: 44 % (ref 21–52)
MCH RBC QN AUTO: 29 PG (ref 24–34)
MCHC RBC AUTO-ENTMCNC: 35.3 G/DL (ref 31–37)
MCV RBC AUTO: 82.2 FL (ref 74–97)
MONOCYTES # BLD: 0.9 K/UL (ref 0.05–1.2)
MONOCYTES NFR BLD: 8 % (ref 3–10)
NEUTS SEG # BLD: 4.7 K/UL (ref 1.8–8)
NEUTS SEG NFR BLD: 44 % (ref 40–73)
PLATELET # BLD AUTO: 189 K/UL (ref 135–420)
PMV BLD AUTO: 10.3 FL (ref 9.2–11.8)
POTASSIUM SERPL-SCNC: 4.1 MMOL/L (ref 3.5–5.5)
PROT SERPL-MCNC: 6.4 G/DL (ref 6.4–8.2)
RBC # BLD AUTO: 5.27 M/UL (ref 4.7–5.5)
SODIUM SERPL-SCNC: 141 MMOL/L (ref 136–145)
WBC # BLD AUTO: 10.8 K/UL (ref 4.6–13.2)

## 2017-11-10 PROCEDURE — 85025 COMPLETE CBC W/AUTO DIFF WBC: CPT | Performed by: EMERGENCY MEDICINE

## 2017-11-10 PROCEDURE — 96372 THER/PROPH/DIAG INJ SC/IM: CPT

## 2017-11-10 PROCEDURE — 80053 COMPREHEN METABOLIC PANEL: CPT | Performed by: EMERGENCY MEDICINE

## 2017-11-10 PROCEDURE — 99285 EMERGENCY DEPT VISIT HI MDM: CPT

## 2017-11-10 PROCEDURE — 85379 FIBRIN DEGRADATION QUANT: CPT | Performed by: EMERGENCY MEDICINE

## 2017-11-10 RX ORDER — ENOXAPARIN SODIUM 100 MG/ML
1 INJECTION SUBCUTANEOUS
Status: DISCONTINUED | OUTPATIENT
Start: 2017-11-10 | End: 2017-11-11 | Stop reason: SDUPTHER

## 2017-11-10 RX ORDER — OXYCODONE AND ACETAMINOPHEN 5; 325 MG/1; MG/1
1 TABLET ORAL
Status: COMPLETED | OUTPATIENT
Start: 2017-11-10 | End: 2017-11-11

## 2017-11-11 ENCOUNTER — HOSPITAL ENCOUNTER (INPATIENT)
Age: 27
LOS: 3 days | Discharge: HOME | DRG: 176 | End: 2017-11-14
Attending: EMERGENCY MEDICINE | Admitting: HOSPITALIST
Payer: SELF-PAY

## 2017-11-11 ENCOUNTER — HOSPITAL ENCOUNTER (OUTPATIENT)
Dept: VASCULAR SURGERY | Age: 27
Discharge: HOME | End: 2017-11-11
Payer: SELF-PAY

## 2017-11-11 ENCOUNTER — APPOINTMENT (OUTPATIENT)
Dept: CT IMAGING | Age: 27
DRG: 176 | End: 2017-11-11
Attending: EMERGENCY MEDICINE
Payer: SELF-PAY

## 2017-11-11 VITALS
TEMPERATURE: 98 F | DIASTOLIC BLOOD PRESSURE: 96 MMHG | BODY MASS INDEX: 36.53 KG/M2 | WEIGHT: 300 LBS | SYSTOLIC BLOOD PRESSURE: 130 MMHG | OXYGEN SATURATION: 98 % | HEIGHT: 76 IN | RESPIRATION RATE: 23 BRPM | HEART RATE: 105 BPM

## 2017-11-11 DIAGNOSIS — I26.99 OTHER PULMONARY EMBOLISM WITHOUT ACUTE COR PULMONALE, UNSPECIFIED CHRONICITY (HCC): Primary | ICD-10-CM

## 2017-11-11 DIAGNOSIS — M79.662 PAIN OF LEFT CALF: ICD-10-CM

## 2017-11-11 LAB
ANION GAP SERPL CALC-SCNC: 6 MMOL/L (ref 3–18)
ATRIAL RATE: 86 BPM
ATRIAL RATE: 97 BPM
BASOPHILS # BLD: 0 K/UL (ref 0–0.1)
BASOPHILS NFR BLD: 0 % (ref 0–2)
BUN SERPL-MCNC: 17 MG/DL (ref 7–18)
BUN/CREAT SERPL: 15 (ref 12–20)
CALCIUM SERPL-MCNC: 8.8 MG/DL (ref 8.5–10.1)
CALCULATED P AXIS, ECG09: 42 DEGREES
CALCULATED P AXIS, ECG09: 51 DEGREES
CALCULATED R AXIS, ECG10: 57 DEGREES
CALCULATED R AXIS, ECG10: 77 DEGREES
CALCULATED T AXIS, ECG11: 13 DEGREES
CALCULATED T AXIS, ECG11: 44 DEGREES
CHLORIDE SERPL-SCNC: 105 MMOL/L (ref 100–108)
CO2 SERPL-SCNC: 31 MMOL/L (ref 21–32)
CREAT SERPL-MCNC: 1.11 MG/DL (ref 0.6–1.3)
DIAGNOSIS, 93000: NORMAL
DIAGNOSIS, 93000: NORMAL
DIFFERENTIAL METHOD BLD: ABNORMAL
EOSINOPHIL # BLD: 0.4 K/UL (ref 0–0.4)
EOSINOPHIL NFR BLD: 5 % (ref 0–5)
ERYTHROCYTE [DISTWIDTH] IN BLOOD BY AUTOMATED COUNT: 14.8 % (ref 11.6–14.5)
GLUCOSE SERPL-MCNC: 84 MG/DL (ref 74–99)
HCT VFR BLD AUTO: 45.2 % (ref 36–48)
HGB BLD-MCNC: 15.9 G/DL (ref 13–16)
LYMPHOCYTES # BLD: 3 K/UL (ref 0.9–3.6)
LYMPHOCYTES NFR BLD: 36 % (ref 21–52)
MCH RBC QN AUTO: 28.9 PG (ref 24–34)
MCHC RBC AUTO-ENTMCNC: 35.2 G/DL (ref 31–37)
MCV RBC AUTO: 82.2 FL (ref 74–97)
MONOCYTES # BLD: 0.8 K/UL (ref 0.05–1.2)
MONOCYTES NFR BLD: 10 % (ref 3–10)
NEUTS SEG # BLD: 4.1 K/UL (ref 1.8–8)
NEUTS SEG NFR BLD: 49 % (ref 40–73)
P-R INTERVAL, ECG05: 134 MS
P-R INTERVAL, ECG05: 154 MS
PLATELET # BLD AUTO: 213 K/UL (ref 135–420)
PMV BLD AUTO: 10.4 FL (ref 9.2–11.8)
POTASSIUM SERPL-SCNC: 4.2 MMOL/L (ref 3.5–5.5)
Q-T INTERVAL, ECG07: 342 MS
Q-T INTERVAL, ECG07: 344 MS
QRS DURATION, ECG06: 92 MS
QRS DURATION, ECG06: 94 MS
QTC CALCULATION (BEZET), ECG08: 411 MS
QTC CALCULATION (BEZET), ECG08: 434 MS
RBC # BLD AUTO: 5.5 M/UL (ref 4.7–5.5)
SODIUM SERPL-SCNC: 142 MMOL/L (ref 136–145)
TROPONIN I SERPL-MCNC: <0.02 NG/ML (ref 0–0.04)
VENTRICULAR RATE, ECG03: 86 BPM
VENTRICULAR RATE, ECG03: 97 BPM
WBC # BLD AUTO: 8.3 K/UL (ref 4.6–13.2)

## 2017-11-11 PROCEDURE — 94640 AIRWAY INHALATION TREATMENT: CPT

## 2017-11-11 PROCEDURE — 74011250637 HC RX REV CODE- 250/637: Performed by: EMERGENCY MEDICINE

## 2017-11-11 PROCEDURE — 93005 ELECTROCARDIOGRAM TRACING: CPT

## 2017-11-11 PROCEDURE — 74011636637 HC RX REV CODE- 636/637: Performed by: EMERGENCY MEDICINE

## 2017-11-11 PROCEDURE — 74011000250 HC RX REV CODE- 250: Performed by: HOSPITALIST

## 2017-11-11 PROCEDURE — 74011250636 HC RX REV CODE- 250/636: Performed by: EMERGENCY MEDICINE

## 2017-11-11 PROCEDURE — 74011636320 HC RX REV CODE- 636/320: Performed by: EMERGENCY MEDICINE

## 2017-11-11 PROCEDURE — 74011250637 HC RX REV CODE- 250/637: Performed by: HOSPITALIST

## 2017-11-11 PROCEDURE — 94761 N-INVAS EAR/PLS OXIMETRY MLT: CPT

## 2017-11-11 PROCEDURE — 71275 CT ANGIOGRAPHY CHEST: CPT

## 2017-11-11 PROCEDURE — 80048 BASIC METABOLIC PNL TOTAL CA: CPT | Performed by: EMERGENCY MEDICINE

## 2017-11-11 PROCEDURE — 93971 EXTREMITY STUDY: CPT

## 2017-11-11 PROCEDURE — 96372 THER/PROPH/DIAG INJ SC/IM: CPT

## 2017-11-11 PROCEDURE — 74011250636 HC RX REV CODE- 250/636: Performed by: HOSPITALIST

## 2017-11-11 PROCEDURE — 99283 EMERGENCY DEPT VISIT LOW MDM: CPT

## 2017-11-11 PROCEDURE — 85025 COMPLETE CBC W/AUTO DIFF WBC: CPT | Performed by: EMERGENCY MEDICINE

## 2017-11-11 PROCEDURE — 74011000250 HC RX REV CODE- 250: Performed by: EMERGENCY MEDICINE

## 2017-11-11 PROCEDURE — 65660000000 HC RM CCU STEPDOWN

## 2017-11-11 PROCEDURE — 77030029684 HC NEB SM VOL KT MONA -A

## 2017-11-11 PROCEDURE — 84484 ASSAY OF TROPONIN QUANT: CPT | Performed by: EMERGENCY MEDICINE

## 2017-11-11 RX ORDER — IPRATROPIUM BROMIDE AND ALBUTEROL SULFATE 2.5; .5 MG/3ML; MG/3ML
3 SOLUTION RESPIRATORY (INHALATION)
Status: DISCONTINUED | OUTPATIENT
Start: 2017-11-11 | End: 2017-11-14 | Stop reason: HOSPADM

## 2017-11-11 RX ORDER — ENOXAPARIN SODIUM 150 MG/ML
150 INJECTION SUBCUTANEOUS EVERY 12 HOURS
Status: DISCONTINUED | OUTPATIENT
Start: 2017-11-11 | End: 2017-11-12

## 2017-11-11 RX ORDER — ALBUTEROL SULFATE 0.83 MG/ML
5 SOLUTION RESPIRATORY (INHALATION)
Status: COMPLETED | OUTPATIENT
Start: 2017-11-11 | End: 2017-11-11

## 2017-11-11 RX ORDER — ENOXAPARIN SODIUM 150 MG/ML
1 INJECTION SUBCUTANEOUS
Status: COMPLETED | OUTPATIENT
Start: 2017-11-11 | End: 2017-11-11

## 2017-11-11 RX ORDER — OXYCODONE AND ACETAMINOPHEN 5; 325 MG/1; MG/1
1 TABLET ORAL
Status: COMPLETED | OUTPATIENT
Start: 2017-11-11 | End: 2017-11-11

## 2017-11-11 RX ORDER — DIPHENHYDRAMINE HYDROCHLORIDE 50 MG/ML
12.5 INJECTION, SOLUTION INTRAMUSCULAR; INTRAVENOUS
Status: DISCONTINUED | OUTPATIENT
Start: 2017-11-11 | End: 2017-11-14 | Stop reason: HOSPADM

## 2017-11-11 RX ORDER — ALBUTEROL SULFATE 2.5 MG/.5ML
5 SOLUTION RESPIRATORY (INHALATION)
Status: COMPLETED | OUTPATIENT
Start: 2017-11-11 | End: 2017-11-11

## 2017-11-11 RX ORDER — AZITHROMYCIN 250 MG/1
250 TABLET, FILM COATED ORAL
Status: COMPLETED | OUTPATIENT
Start: 2017-11-11 | End: 2017-11-11

## 2017-11-11 RX ORDER — GUAIFENESIN 600 MG/1
600 TABLET, EXTENDED RELEASE ORAL 2 TIMES DAILY
Status: DISCONTINUED | OUTPATIENT
Start: 2017-11-11 | End: 2017-11-14 | Stop reason: HOSPADM

## 2017-11-11 RX ORDER — IPRATROPIUM BROMIDE 0.5 MG/2.5ML
0.5 SOLUTION RESPIRATORY (INHALATION)
Status: COMPLETED | OUTPATIENT
Start: 2017-11-11 | End: 2017-11-11

## 2017-11-11 RX ORDER — ENOXAPARIN SODIUM 100 MG/ML
100 INJECTION SUBCUTANEOUS
Status: COMPLETED | OUTPATIENT
Start: 2017-11-11 | End: 2017-11-11

## 2017-11-11 RX ORDER — AZITHROMYCIN 250 MG/1
250 TABLET, FILM COATED ORAL DAILY
Status: DISCONTINUED | OUTPATIENT
Start: 2017-11-12 | End: 2017-11-14 | Stop reason: HOSPADM

## 2017-11-11 RX ORDER — ZOLPIDEM TARTRATE 5 MG/1
5 TABLET ORAL
Status: DISCONTINUED | OUTPATIENT
Start: 2017-11-11 | End: 2017-11-14 | Stop reason: HOSPADM

## 2017-11-11 RX ORDER — ONDANSETRON 2 MG/ML
4 INJECTION INTRAMUSCULAR; INTRAVENOUS
Status: DISCONTINUED | OUTPATIENT
Start: 2017-11-11 | End: 2017-11-14 | Stop reason: HOSPADM

## 2017-11-11 RX ORDER — PREDNISONE 20 MG/1
40 TABLET ORAL
Status: DISCONTINUED | OUTPATIENT
Start: 2017-11-12 | End: 2017-11-14 | Stop reason: HOSPADM

## 2017-11-11 RX ORDER — NALOXONE HYDROCHLORIDE 0.4 MG/ML
0.4 INJECTION, SOLUTION INTRAMUSCULAR; INTRAVENOUS; SUBCUTANEOUS AS NEEDED
Status: DISCONTINUED | OUTPATIENT
Start: 2017-11-11 | End: 2017-11-14 | Stop reason: HOSPADM

## 2017-11-11 RX ORDER — OXYCODONE AND ACETAMINOPHEN 5; 325 MG/1; MG/1
1 TABLET ORAL
Status: DISCONTINUED | OUTPATIENT
Start: 2017-11-11 | End: 2017-11-11

## 2017-11-11 RX ORDER — DOCUSATE SODIUM 100 MG/1
100 CAPSULE, LIQUID FILLED ORAL 2 TIMES DAILY
Status: DISCONTINUED | OUTPATIENT
Start: 2017-11-11 | End: 2017-11-14 | Stop reason: HOSPADM

## 2017-11-11 RX ORDER — PREDNISONE 20 MG/1
60 TABLET ORAL
Status: COMPLETED | OUTPATIENT
Start: 2017-11-11 | End: 2017-11-11

## 2017-11-11 RX ORDER — MORPHINE SULFATE 2 MG/ML
2 INJECTION, SOLUTION INTRAMUSCULAR; INTRAVENOUS
Status: DISCONTINUED | OUTPATIENT
Start: 2017-11-11 | End: 2017-11-13

## 2017-11-11 RX ORDER — ALBUTEROL SULFATE 2.5 MG/.5ML
2.5 SOLUTION RESPIRATORY (INHALATION)
Status: DISCONTINUED | OUTPATIENT
Start: 2017-11-11 | End: 2017-11-14 | Stop reason: HOSPADM

## 2017-11-11 RX ORDER — BUDESONIDE 0.5 MG/2ML
500 INHALANT ORAL
Status: DISCONTINUED | OUTPATIENT
Start: 2017-11-11 | End: 2017-11-14 | Stop reason: HOSPADM

## 2017-11-11 RX ORDER — HYDROCODONE BITARTRATE AND ACETAMINOPHEN 10; 325 MG/1; MG/1
1 TABLET ORAL
Status: DISCONTINUED | OUTPATIENT
Start: 2017-11-11 | End: 2017-11-12

## 2017-11-11 RX ADMIN — ALBUTEROL SULFATE 5 MG: 2.5 SOLUTION RESPIRATORY (INHALATION) at 14:26

## 2017-11-11 RX ADMIN — AZITHROMYCIN 250 MG: 250 TABLET, FILM COATED ORAL at 12:13

## 2017-11-11 RX ADMIN — ENOXAPARIN SODIUM 136.5 MG: 150 INJECTION SUBCUTANEOUS at 00:46

## 2017-11-11 RX ADMIN — OXYCODONE HYDROCHLORIDE AND ACETAMINOPHEN 1 TABLET: 5; 325 TABLET ORAL at 00:48

## 2017-11-11 RX ADMIN — ALBUTEROL SULFATE 5 MG: 2.5 SOLUTION RESPIRATORY (INHALATION) at 11:24

## 2017-11-11 RX ADMIN — BUDESONIDE 500 MCG: 0.5 INHALANT RESPIRATORY (INHALATION) at 21:27

## 2017-11-11 RX ADMIN — PREDNISONE 60 MG: 20 TABLET ORAL at 11:24

## 2017-11-11 RX ADMIN — ENOXAPARIN SODIUM 100 MG: 100 INJECTION, SOLUTION INTRAVENOUS; SUBCUTANEOUS at 14:48

## 2017-11-11 RX ADMIN — IOPAMIDOL 100 ML: 755 INJECTION, SOLUTION INTRAVENOUS at 13:15

## 2017-11-11 RX ADMIN — GUAIFENESIN 600 MG: 600 TABLET, EXTENDED RELEASE ORAL at 21:23

## 2017-11-11 RX ADMIN — IPRATROPIUM BROMIDE 0.5 MG: 0.5 SOLUTION RESPIRATORY (INHALATION) at 14:26

## 2017-11-11 RX ADMIN — Medication 2 MG: at 18:35

## 2017-11-11 RX ADMIN — IPRATROPIUM BROMIDE AND ALBUTEROL SULFATE 3 ML: .5; 3 SOLUTION RESPIRATORY (INHALATION) at 21:23

## 2017-11-11 RX ADMIN — OXYCODONE HYDROCHLORIDE AND ACETAMINOPHEN 1 TABLET: 5; 325 TABLET ORAL at 11:33

## 2017-11-11 NOTE — ED PROVIDER NOTES
[facility] EMERGENCY DEPT      11:43 PM Ankita Rai is a 32 y.o. male with h/o asthma who presents to ED complaining of constant left calf pain onset 3 days ago. He reports his left calf pain is a 10/10 and is exacerbated with movement and ambulating. The patient states he was admitted on 11/3/17 for asthma exacerbation and was discharged on 11/7/17. He reports since leaving the hospital he has had left calf pain and has been walking with a limp. The patient states he is concerned for a thrombus because he was laying in a hospital bed for 4 days. The patient states he was dropped off at the ED by a friend and will not be driving after leaving the ED today. Denies drinking ETOH and smoking. PCP: Collin Allan MD      No other complaints. Nursing nurses regarding the HPI and triage nursing notes were reviewed. Prior medical records were reviewed. No current facility-administered medications for this encounter. Current Outpatient Prescriptions   Medication Sig    azithromycin (ZITHROMAX) 250 mg tablet Take 1 Tab by mouth daily for 5 days.  guaiFENesin ER (MUCINEX) 600 mg ER tablet Take 1 Tab by mouth two (2) times a day.  predniSONE (DELTASONE) 20 mg tablet Take 2 Tabs by mouth daily (with breakfast). For 3 days.  ipratropium-albuterol (COMBIVENT RESPIMAT)  mcg/actuation inhaler Take 1 Puff by inhalation every six (6) hours. Past Medical History:   Diagnosis Date    Asthma        No past surgical history on file. No family history on file. Social History     Social History    Marital status:      Spouse name: N/A    Number of children: N/A    Years of education: N/A     Occupational History    Not on file.      Social History Main Topics    Smoking status: Former Smoker    Smokeless tobacco: Never Used    Alcohol use Yes    Drug use: No    Sexual activity: Not on file     Other Topics Concern    Not on file     Social History Narrative    No narrative on file       No Known Allergies    Patient's primary care provider (as noted in EPIC):  Joseph Grewal MD    REVIEW OF SYSTEMS:    Constitutional:  Negative for diaphoresis. Eyes:  Negative for diploplia. HENT:  Negative for congestion. Respiratory:  Negative for stridor. Cardiovascular:  Negative for palpitations. Gastrointestinal:  Negative for diarrhea. Genitourinary:  Negative for flank pain. Musculoskeletal:  Negative for back pain. Positive for left calf pain. Skin:  Negative for pallor. Neurological:  Negative for weakness. Psychiatric:  Negative for hallucinations. Visit Vitals    /50 (BP 1 Location: Right arm, BP Patient Position: At rest)    Pulse (!) 125    Temp 98.7 °F (37.1 °C)    Resp 23    Ht 6' 4\" (1.93 m)    Wt 136.1 kg (300 lb)    SpO2 97%    BMI 36.52 kg/m2       PHYSICAL EXAM:    CONSTITUTIONAL:  Alert, in no apparent distress;  well developed;  well nourished. HEAD:  Normocephalic, atraumatic. EYES:  EOMI. Non-icteric sclera. Normal conjunctiva. ENTM:  Nose:  no rhinorrhea. Throat:  no erythema or exudate, mucous membranes moist.  NECK:  No JVD. Supple  RESPIRATORY:  Chest clear, equal breath sounds, good air movement. CARDIOVASCULAR:  Regular rate and rhythm. No murmurs, rubs, or gallops. GI:  Normal bowel sounds, abdomen soft and non-tender. No rebound or guarding. BACK:  Non-tender. UPPER EXT:  Normal inspection. LEFT LOWER EXT:  No edema, BUT PROXIMAL REPRODUCIBLE calf tenderness. Distal pulses intact. No palpable cord. No Estefany's sign. No rash, bruises, lesions. No cellulitis. NEURO:  Moves all four extremities, and grossly normal motor exam.  SKIN:  No rashes;  Normal for age. PSYCH:  Alert and normal affect.     DIFFERENTIAL DIAGNOSES/ MEDICAL DECISION MAKING:  Lower leg swelling etiologies include deep venous thrombosis, acute arterial occlusion, dependent edema, phlebitis, cellulitis, swelling from venous insufficiency, chronic lower extremity edema as part of congestive heart failure, nephrotic syndrome, arthritis, other etiologies versus a combination of the above. Abnormal lab results from this emergency department encounter:  Labs Reviewed   CBC WITH AUTOMATED DIFF - Abnormal; Notable for the following:        Result Value    RDW 14.9 (*)     ABS. LYMPHOCYTES 4.7 (*)     All other components within normal limits   METABOLIC PANEL, COMPREHENSIVE - Abnormal; Notable for the following:     Glucose 106 (*)     Calcium 8.2 (*)     AST (SGOT) 11 (*)     Albumin 3.0 (*)     All other components within normal limits   D DIMER - Abnormal; Notable for the following:     D DIMER 1.14 (*)     All other components within normal limits       Lab values for this patient within approximately the last 12 hours:  Recent Results (from the past 12 hour(s))   CBC WITH AUTOMATED DIFF    Collection Time: 11/10/17  9:59 PM   Result Value Ref Range    WBC 10.8 4.6 - 13.2 K/uL    RBC 5.27 4.70 - 5.50 M/uL    HGB 15.3 13.0 - 16.0 g/dL    HCT 43.3 36.0 - 48.0 %    MCV 82.2 74.0 - 97.0 FL    MCH 29.0 24.0 - 34.0 PG    MCHC 35.3 31.0 - 37.0 g/dL    RDW 14.9 (H) 11.6 - 14.5 %    PLATELET 209 245 - 940 K/uL    MPV 10.3 9.2 - 11.8 FL    NEUTROPHILS 44 40 - 73 %    LYMPHOCYTES 44 21 - 52 %    MONOCYTES 8 3 - 10 %    EOSINOPHILS 4 0 - 5 %    BASOPHILS 0 0 - 2 %    ABS. NEUTROPHILS 4.7 1.8 - 8.0 K/UL    ABS. LYMPHOCYTES 4.7 (H) 0.9 - 3.6 K/UL    ABS. MONOCYTES 0.9 0.05 - 1.2 K/UL    ABS. EOSINOPHILS 0.4 0.0 - 0.4 K/UL    ABS.  BASOPHILS 0.0 0.0 - 0.06 K/UL    DF AUTOMATED     METABOLIC PANEL, COMPREHENSIVE    Collection Time: 11/10/17  9:59 PM   Result Value Ref Range    Sodium 141 136 - 145 mmol/L    Potassium 4.1 3.5 - 5.5 mmol/L    Chloride 108 100 - 108 mmol/L    CO2 27 21 - 32 mmol/L    Anion gap 6 3.0 - 18 mmol/L    Glucose 106 (H) 74 - 99 mg/dL    BUN 18 7.0 - 18 MG/DL    Creatinine 1.26 0.6 - 1.3 MG/DL    BUN/Creatinine ratio 14 12 - 20 GFR est AA >60 >60 ml/min/1.73m2    GFR est non-AA >60 >60 ml/min/1.73m2    Calcium 8.2 (L) 8.5 - 10.1 MG/DL    Bilirubin, total 0.4 0.2 - 1.0 MG/DL    ALT (SGPT) 23 16 - 61 U/L    AST (SGOT) 11 (L) 15 - 37 U/L    Alk. phosphatase 47 45 - 117 U/L    Protein, total 6.4 6.4 - 8.2 g/dL    Albumin 3.0 (L) 3.4 - 5.0 g/dL    Globulin 3.4 2.0 - 4.0 g/dL    A-G Ratio 0.9 0.8 - 1.7     D DIMER    Collection Time: 11/10/17  9:59 PM   Result Value Ref Range    D DIMER 1.14 (H) <0.46 ug/ml(FEU)       Radiologist and cardiologist interpretations if available at time of this note:  No results found. Medication(s) ordered for patient during this emergency visit encounter:  Medications - No data to display    [address]:  Based upon the patients presentation with noted HPI and PE, along with the work up done in the emergency department, I believe that the patient is having leg pain that is concerning for possible DVT. However, no PVL is available at present time in the emergency department. Given this, the patient will be given a weight based dose of lovenox in ED and a prescription to have a PVL study done in the next morning at [facility] radiology department. DIAGNOSIS:  1. Leg pain, with patient to return in morning to have PVL study done to assess for DVT    SPECIFIC PATIENT INSTRUCTIONS FROM THE PHYSICIAN WHO TREATED YOU IN THE ER TODAY:  1. Return if any concerns or worsening of condition(s). 2. You were given a prescription ordering a PVL study to be done tomorrow morning at [facility] radiology. You must go to [facility] EM[facility]GENCY [facility] radiology in the morning to have this study done. Darel Bray L. Lafayette Hodgkins, M.D.     Provider Attestation:  If a scribe was utilized in generation of this patient record, I personally performed the services described in the documentation, reviewed the documentation, as recorded by the scribe in my presence, and it accurately records the patient's history of presenting illness, review of systems, patient physical examination, and procedures performed by me as the attending physician. Mayra Rincon M.D. [facility] Board Certified Emergency Physician  11/10/2017.  11:34 PM    Scribconnie Moreno acting as a scribe for and in the presence of Dg Trujillo MD      November 10, 2017 at 11:43 PM       Provider Attestation:      I personally performed the services described in the documentation, reviewed the documentation, as recorded by the scribe in my presence, and it accurately and completely records my words and actions.  November 10, 2017 at 11:43 PM - Dg Trujillo MD

## 2017-11-11 NOTE — IP AVS SNAPSHOT
Sobeida Dashawn 
 
 
 [address] [facility] [address] [place] [address] Patient: Bia Da Silva MRN: [id number] :1990 About your hospitalization You were admitted on:  2017 You last received care in the:  [facility] [address] You were discharged on:  2017 Why you were hospitalized Your primary diagnosis was:  Not on File Your diagnoses also included:  Pulmonary Embolism (Hcc), Pe (Pulmonary Thromboembolism) (Hcc) Things You Need To Do (next 8 weeks)  New Patient with Parul Hopper MD at  8:30 AM  
Where: [address] ([address]) Go to Beka Cunningham MD  
follow up @11:00am  
  
Phone:  [phone] Where:  [address] [facility], [address] [place] [id number] Discharge Orders None A check kaelyn indicates which time of day the medication should be taken. My Medications STOP taking these medications   
 ipratropium-albuterol  mcg/actuation inhaler Commonly known as:  Benna Idler Replaced by:  albuterol-ipratropium 2.5 mg-0.5 mg/3 ml Nebu TAKE these medications as instructed Instructions Each Dose to Equal  
 Morning Noon Evening Bedtime  
 albuterol 90 mcg/actuation inhaler Commonly known as:  PROVENTIL HFA, VENTOLIN HFA, PROAIR HFA Your last dose was: Your next dose is: Take 1 Puff by inhalation every four (4) hours as needed for Wheezing. 1 Puff  
    
   
   
   
  
 albuterol-ipratropium 2.5 mg-0.5 mg/3 ml Nebu Commonly known as:  Bahman Asters Your last dose was: Your next dose is:    
   
   
 3 mL by Nebulization route every four (4) hours as needed. 3 mL  
    
   
   
   
  
 apixaban 5 mg tablet Commonly known as:  Ade Coon Your last dose was: Your next dose is: Take 2 Tabs by mouth two (2) times a day for 5 days. (10 doses). Then 1 tab twice daily afterwards for 3 months. 10 mg  
    
   
   
   
  
 azithromycin 250 mg tablet Commonly known as:  Natali Dillon Your last dose was: Your next dose is: Take 1 Tab by mouth daily for 2 days. 250 mg  
    
   
   
   
  
 budesonide 0.5 mg/2 mL Nbsp Commonly known as:  PULMICORT Your last dose was: Your next dose is:    
   
   
 2 mL by Nebulization route two (2) times a day. 500 mcg  
    
   
   
   
  
 famotidine 20 mg tablet Commonly known as:  PEPCID Your last dose was: Your next dose is: Take 1 Tab by mouth every twelve (12) hours. 20 mg  
    
   
   
   
  
 guaiFENesin  mg ER tablet Commonly known as:  NormOxys Your last dose was: Your next dose is: Take 1 Tab by mouth two (2) times a day. 600 mg  
    
   
   
   
  
 * OTHER Your last dose was: Your next dose is:    
   
   
 Left LE Doppler ultrasound  Indications: LLE DVT  
     
   
   
   
  
 * OTHER Your last dose was: Your next dose is:    
   
   
 Left LE Doppler ultrasound in 3 months. Indications: LLE DVT  
     
   
   
   
  
 oxyCODONE-acetaminophen  mg per tablet Commonly known as:  PERCOCET 10 Your last dose was: Your next dose is: Take 1 Tab by mouth every four (4) hours as needed. Max Daily Amount: 6 Tabs. 1 Tab * predniSONE 20 mg tablet Commonly known as:  Savannah Estonian Start taking on:  11/15/2017 Your last dose was: Your next dose is: Take 2 Tabs by mouth daily (with breakfast) for 3 days. 40 mg  
    
   
   
   
  
 * predniSONE 20 mg tablet Commonly known as:  Savannah Estonian Start taking on:  11/18/2017 Your last dose was: Your next dose is: Take 1 Tab by mouth daily (with breakfast) for 3 days. 20 mg  
    
   
   
   
  
 * predniSONE 10 mg tablet Commonly known as:  Jessica Dangelo Start taking on:  11/21/2017 Your last dose was: Your next dose is: Take 1 Tab by mouth daily (with breakfast) for 3 days. 10 mg  
    
   
   
   
  
 * Notice: This list has 5 medication(s) that are the same as other medications prescribed for you. Read the directions carefully, and ask your doctor or other care provider to review them with you. Where to Get Your Medications Information on where to get these meds will be given to you by the nurse or doctor. ! Ask your nurse or doctor about these medications  
  albuterol 90 mcg/actuation inhaler  
 albuterol-ipratropium 2.5 mg-0.5 mg/3 ml Nebu  
 apixaban 5 mg tablet  
 azithromycin 250 mg tablet  
 budesonide 0.5 mg/2 mL Nbsp  
 famotidine 20 mg tablet OTHER  
 OTHER  
 oxyCODONE-acetaminophen  mg per tablet  
 predniSONE 10 mg tablet  
 predniSONE 20 mg tablet  
 predniSONE 20 mg tablet Discharge Instructions Pulmonary Embolism: Care Instructions Your Care Instructions Pulmonary embolism is the sudden blockage of an artery in the lung. Blood clots in the deep veins of the leg or pelvis (deep vein thrombosis, or DVT) are the most common cause. These blood clots can travel to the lungs. Pulmonary embolism can be very serious. Because you have had one pulmonary embolism, you are at greater risk for having another one. But you can take steps to prevent another pulmonary embolism by following your doctor's instructions. You will probably take a prescription blood-thinning medicine to prevent blood clots. A blood thinner can stop a blood clot from growing larger and prevent new clots from forming. Follow-up care is a key part of your treatment and safety.  Be sure to make and go to all appointments, and call your doctor if you are having problems. It's also a good idea to know your test results and keep a list of the medicines you take. How can you care for yourself at home? · Take your medicines exactly as prescribed. Call your doctor if you think you are having a problem with your medicine. You will get more details on the specific medicines your doctor prescribes. · If you are taking a blood thinner, be sure you get instructions about how to take your medicine safely. Blood thinners can cause serious bleeding problems. Preventing future pulmonary embolisms · Exercise. Keep blood moving in your legs to keep clots from forming. If you are traveling by car, stop every hour or so. Get out and walk around for a few minutes. If you are traveling by bus, train, or plane, get out of your seat and walk up and down the aisles every hour or so. You also can do leg exercises while you are seated. Pump your feet up and down by pulling your toes up toward your knees then pointing them down. · Get up out of bed as soon as possible after an illness or surgery. · Do not smoke. If you need help quitting, talk to your doctor about stop-smoking programs and medicines. These can increase your chances of quitting for good. · Check with your doctor before taking hormone or birth control pills. These may increase your risk of blot clots. · Ask your doctor about wearing compression stockings to help prevent blood clots in your legs. There are different types of stockings, and they need to fit right. So your doctor will recommend what you need. When should you call for help? Call 911 anytime you think you may need emergency care. For example, call if: 
? · You have shortness of breath. ? · You have chest pain. ? · You passed out (lost consciousness). ? · You cough up blood. ?Call your doctor now or seek immediate medical care if: ? · You have new or worsening pain or swelling in your leg. ? Watch closely for changes in your health, and be sure to contact your doctor if: 
? · You do not get better as expected. Where can you learn more? Go to http://evangelina-anne-marie.info/. Enter V048 in the search box to learn more about \"Pulmonary Embolism: Care Instructions. \" Current as of: March 20, 2017 Content Version: 11.4 © 7204-5008 [employer]. Care instructions adapted under license by [employer] (which disclaims liability or warranty for this information). If you have questions about a medical condition or this instruction, always ask your healthcare professional. [address] any warranty or liability for your use of this information. Learning About Deep Vein Thrombosis What is deep vein thrombosis? A deep vein thrombosis (DVT) is a blood clot in certain veins of the legs, pelvis, or arms. The clot is usually in the legs. DVT may damage the vein and cause the area to ache, swell, and change color. DVT also can lead to sores. DVT in these veins needs to be treated because the clots can get bigger, break loose, and travel through the bloodstream to the lungs. A blood clot in a lung can cause death. Blood clots can form in the veins when you are not active for a long period of time. For example, they can form if you need to stay in bed because of a health problem or must sit for a long time on an airplane or in a car. Surgery or an injury can damage your blood vessels and cause a clot to form. Cancer also can cause DVT. And some people have blood that clots too easily, which is a problem that may run in families. A risk factor is something that makes you more likely to develop a disease. Here are some major risk factors for DVT: 
· You have surgery. · You have to stay in bed for more than 3 days (such as in the hospital). · Your blood is likely to clot because of an injury, cancer, or inherited condition. Here are some minor risk factors for DVT: 
· You take birth control hormones. · You are pregnant. · You are in a car or airplane for a long trip. What are the symptoms? Symptoms of DVT may include: · Swelling in the affected area. · Redness and warmth in the affected area. · Pain or tenderness. You may have pain only when you touch the affected area or when you stand or walk. If your doctor thinks you may have DVT, you will probably have an ultrasound test. You may have other tests as well. How can you prevent DVT? · Exercise your lower leg muscles to help blood flow in your legs. Point your toes up toward your head so the calves of your legs are stretched, then relax and repeat. This is a good exercise to do when you are sitting for long periods of time. · Get out of bed as soon as you can after an illness or surgery. If you need to stay in bed, do the leg exercise noted above every hour when you are awake. · Use special stockings called compression stockings. These stockings are tight at the feet with a gradually looser fit on the leg. Many doctors recommend that you wear compression stockings during a journey longer than 8 hours. · Take breaks when you are on long trips. Stop the car and walk around. On long airplane flights, walk up and down the aisle hourly, flex and point your feet every 20 minutes while sitting, and drink plenty of water. · Take blood-thinning medicines after some types of surgery if your doctor recommends it. Blood thinners also may be used if you are likely to develop clots. How is DVT treated? Treatment for DVT usually involves taking blood thinners. These medicines are given through a vein (intravenously, or IV) or as a pill. Talk with your doctor about which medicine is right for you.  
Your doctor also may suggest that you prop up or elevate your leg when possible, take walks, and wear compression stockings. These measures may help reduce the pain and swelling that can happen with DVT. Follow-up care is a key part of your treatment and safety. Be sure to make and go to all appointments, and call your doctor if you are having problems. It's also a good idea to know your test results and keep a list of the medicines you take. Where can you learn more? Go to http://evangelina-anne-marie.info/. Enter V880 in the search box to learn more about \"Learning About Deep Vein Thrombosis. \" Current as of: March 20, 2017 Content Version: 11.4 © 9595-6510 [employer]ometry[employer]. Care instructions adapted under license by [employer] (which disclaims liability or warranty for this information). If you have questions about a medical condition or this instruction, always ask your healthcare professional. [place] [address] any warranty or liability for your use of this information. Patient armband removed and shredded DISCHARGE SUMMARY from Nurse PATIENT INSTRUCTIONS: 
 
 
F-face looks uneven A-arms unable to move or move unevenly S-speech slurred or non-existent T-time-call 911 as soon as signs and symptoms begin-DO NOT go Back to bed or wait to see if you get better-TIME IS BRAIN. Warning Signs of HEART ATTACK Call 911 if you have these symptoms: 
? Chest discomfort. Most heart attacks involve discomfort in the center of the chest that lasts more than a few minutes, or that goes away and comes back. It can feel like uncomfortable pressure, squeezing, fullness, or pain. ? Discomfort in other areas of the upper body. Symptoms can include pain or discomfort in one or both arms, the back, neck, jaw, or stomach. ? Shortness of breath with or without chest discomfort. ? Other signs may include breaking out in a cold sweat, nausea, or lightheadedness. Don't wait more than five minutes to call [address]! Fast action can save your life. Calling 911 is almost always the fastest way to get lifesaving treatment. Emergency Medical Services staff can begin treatment when they arrive  up to an hour sooner than if someone gets to the hospital by car. The discharge information has been reviewed with the patient. The patient verbalized understanding. Discharge medications reviewed with the patient and appropriate educational materials and side effects teaching were provided. ___________________________________________________________________________________________________________________________________ Scientific Revenue[employer] Announcement We are excited to announce that we are making your provider's discharge notes available to you in [employer]. You will see these notes when they are completed and signed by the physician that discharged you from your recent hospital stay. If you have any questions or concerns about any information you see in [employer], please call the Health Information Department where you were seen or reach out to your Primary Care Provider for more information about your plan of care. Introducing [facility]! Uvaldo Bustamante introduces [employer] patient portal. Now you can access parts of your medical record, email your doctor's office, and request medication refills online. 1. In your internet browser, go to [email or web address] 2. Click on the First Time User? Click Here link in the Sign In box. You will see the New Member Sign Up page. 3. Enter your [employer] Access Code exactly as it appears below.  You will not need to use this code after youve completed the sign-up process. If you do not sign up before the expiration date, you must request a new code. · Mob[employer] Access Code: [email or web address] Expires: 2/4/2018  1:26 PM 
 
4. Enter the last four digits of your Social Security Number (xxxx) and Date of Birth (mm/dd/yyyy) as indicated and click Submit. You will be taken to the next sign-up page. 5. Create a Mobilygen ID. This will be your [employer] login ID and cannot be changed, so think of one that is secure and easy to remember. 6. Create a [employer] password. You can change your password at any time. 7. Enter your Password Reset Question and Answer. This can be used at a later time if you forget your password. 8. Enter your e-mail address. You will receive e-mail notification when new information is available in [address]. 9. Click Sign Up. You can now view and download portions of your medical record. 10. Click the Download Summary menu link to download a portable copy of your medical information. If you have questions, please visit the Frequently Asked Questions section of the [employer] website. Remember, [employer] is NOT to be used for urgent needs. For medical emergencies, dial 911. Now available from your iPhone and Android! Providers Seen During Your Hospitalization Provider Specialty Primary office phone Regulo Pruett MD Emergency Medicine [phone] Patrick Troy MD Internal Medicine [phone] Immunizations Administered for This Admission Name Date Influenza Vaccine (Quad) PF 11/14/2017 Your Primary Care Physician (PCP) Primary Care Physician Office Phone Office Fax Ric Camacho [phone] You are allergic to the following No active allergies Recent Documentation Height Weight BMI Smoking Status 1.93 m 149.7 kg 40.17 kg/m2 Former Smoker Emergency Contacts Name Discharge Info Relation Home Work Mobile Michelle Flood DISCHARGE CAREGIVER [3] Mother [14] [phone] [facility] DISCHARGE CAREGIVER [3] Spouse [3] [phone] Patient Belongings The following personal items are in your possession at time of discharge: 
  Dental Appliances: None  Visual Aid: None      Home Medications: None   Jewelry: None  Clothing: At bedside, Shirt, Pants, Footwear    Other Valuables: Cell Phone  Personal Items Sent to Safe: no 
 
  
  
Discharge Instructions Attachments/References ALBUTEROL (BY BREATHING) (ENGLISH) IPRATROPIUM/ALBUTEROL (BY BREATHING) (ENGLISH) APIXABAN (BY MOUTH) (ENGLISH) AZITHROMYCIN (BY MOUTH) (ENGLISH) BUDESONIDE (BY MOUTH) (ENGLISH) FAMOTIDINE (BY MOUTH) (ENGLISH) OXYCODONE/ACETAMINOPHEN (BY MOUTH) (ENGLISH) PREDNISONE (BY MOUTH) (ENGLISH) Patient Handouts Albuterol (By breathing) Albuterol (al-BUE-ter-ol) Treats or prevents bronchospasm. Brand Name(s): AccuNeb, ProAir HFA, ProAir RespiClick, Proventil HFA, Ventolin HFA There may be other brand names for this medicine. When This Medicine Should Not Be Used: This medicine is not right for everyone. Do not use it if you had an allergic reaction to albuterol or milk proteins. How to Use This Medicine:  
Aerosol, Powder Under Pressure, Suspension, Solution, Powder · Your doctor will tell you how much medicine to use. Do not use more than directed. Ask your doctor or pharmacist if you have questions about how to use your inhaler, nebulizer, or medicine. · Solution:  
¨ You will use this medicine with an inhaler device called a nebulizer. The nebulizer turns the medicine into a fine mist that you breathe in through your mouth and to your lungs. Your caregiver will show you how to use your nebulizer. ¨ Store unopened vials of this medicine at room temperature, away from heat and direct light. Do not freeze.  An open vial of medicine must be used right away. · Aerosol inhaler: ¨ Shake the inhaler well just before each use. Avoid spraying this medicine into your eyes. ¨ Test spray in the air before using for the first time or if the inhaler has not been used for a while. ¨ If you are supposed to use more than one puff, wait 1 to 2 minutes before inhaling the second puff. Repeat these steps for the next puff, starting with shaking the inhaler. ¨ Clean the inhaler mouthpiece at least once a week with warm running water for 30 seconds. Let it air dry completely. ¨ Store the canister at room temperature, away from heat and direct light. Do not freeze. Do not keep this medicine inside a car where it could be exposed to extreme heat or cold. Do not poke holes in the canister or throw it into a fire, even if the canister is empty. · ProAir® Respiclick® inhaler: ¨ Make sure the cap is closed. Do not open the cap unless you are going to use the medicine. ¨ Hold the inhaler upright. Open the cap fully until you hear a click. Your inhaler is now ready to use. ¨ Close the cap firmly over the mouthpiece after each use. ¨ Keep the inhaler clean and dry at all times. Do not wash or put any part of the inhaler in water. ¨ To clean the mouthpiece, wipe it gently with a dry cloth or tissue. ¨ Keep the medicine in the foil pouch until you are ready to use it. Store at room temperature, away from heat and direct light. Do not freeze. · Read and follow the patient instructions that come with this medicine. Talk to your doctor or pharmacist if you have any questions. · Missed dose: Take a dose as soon as you remember. If it is almost time for your next dose, wait until then and take a regular dose. Do not take extra medicine to make up for a missed dose. Drugs and Foods to Avoid: Ask your doctor or pharmacist before using any other medicine, including over-the-counter medicines, vitamins, and herbal products. · Some foods and medicines can affect how albuterol works. Tell your doctor if you are using any of the following: ¨ Digoxin ¨ Beta-blocker medicine ¨ Diuretic (water pill) ¨ Medicine for depression or an MAO inhibitor within the past 2 weeks Warnings While Using This Medicine: · Tell your doctor if you are pregnant or breastfeeding, or if you have kidney disease, diabetes, heart disease, heart rhythm problems, high blood pressure, overactive thyroid, or a history of seizures. · This medicine may cause the following problems:  
¨ Paradoxical bronchospasm (increased trouble breathing right after use) ¨ Low potassium levels in the blood · If you use a corticosteroid medicine to control your asthma, keep using it as instructed by your doctor. · Call your doctor if your symptoms do not improve or if they get worse. · If any of your asthma medicines do not seem to be working as well as usual, call your doctor right away. Do not change your doses or stop using your medicines without asking your doctor. · Your doctor will check your progress and the effects of this medicine at regular visits. Keep all appointments. · Keep all medicine out of the reach of children. Never share your medicine with anyone. Possible Side Effects While Using This Medicine:  
Call your doctor right away if you notice any of these side effects: · Allergic reaction: Itching or hives, swelling in your face or hands, swelling or tingling in your mouth or throat, chest tightness, trouble breathing · Dry mouth, increased thirst, muscle cramps, nausea, vomiting · Fast, pounding, or uneven heartbeat, chest pain · Lightheadedness, dizziness, or fainting · Trouble breathing, increased wheezing, cough, chest tightness If you notice these less serious side effects, talk with your doctor: · Cough, sore throat, runny or stuffy nose · Headache · Tremors, nervousness If you notice other side effects that you think are caused by this medicine, tell your doctor. Call your doctor for medical advice about side effects. You may report side effects to FDA at 7-794-FDA-3796 © 2017 [address] Information is for End User's use only and may not be sold, redistributed or otherwise used for commercial purposes. The above information is an  only. It is not intended as medical advice for individual conditions or treatments. Talk to your doctor, nurse or pharmacist before following any medical regimen to see if it is safe and effective for you. Ipratropium/Albuterol (By breathing) Albuterol Sulfate (al-BUE-ter-ol SUL-fate), Ipratropium Bromide (ye-lx-FHOT-pee-um BROE-mide) Treats chronic obstructive pulmonary disease (COPD). Brand Name(s): Combivent, Combivent Respimat, Duoneb There may be other brand names for this medicine. When This Medicine Should Not Be Used: This medicine is not right for everyone. Do not use it if you had an allergic reaction to albuterol, ipratropium, levalbuterol, or atropine. Do not use Combivent® if you are allergic to soya lecithin, soybeans, or peanuts. How to Use This Medicine:  
Powder Under Pressure, Solution, Spray · Your doctor will tell you how much medicine to use. Do not use more than directed. This medicine comes in more than one form. Make sure you understand how to use your medicine. Ask your doctor or pharmacist if you have questions. · Read and follow the patient instructions that come with this medicine. Talk to your doctor or pharmacist if you have any questions. · Aerosol inhaler: ¨ You will use this medicine with a device called a metered-dose inhaler. The inhaler fits on the medicine canister and turns the medicine into a fine spray that you breathe in through your mouth and to your lungs. You may be told to use a spacer, which is a tube that is placed between the inhaler and your mouth.  Your caregiver will show you how to use your inhaler and the spacer (if needed). ¨ The first time you use a canister, shake it well for 10 seconds. Then spray the medicine 3 times away from your face. The medicine is now ready to use. ¨ Shake the canister well for 10 seconds each time before you use a dose. If you have not used the medicine in 24 hours, repeat the steps for the first use. ¨ Clean the inhaler mouthpiece daily with warm water. · Spray inhaler: ¨ You need to prime the spray inhaler the first time you use it and if you have not used it for more than 3 days in a row. ¨ To prime the spray inhaler, point the inhaler away from your face and press the button until some medicine sprays out. Then spray the medicine 3 more times. The medicine is now ready to use. ¨ Follow the instructions that came with the medicine if it has been more than 3 days since you used the medicine. ¨ Clean the mouthpiece with a damp cloth or a tissue. Do this at least once a week. · Missed dose: Take a dose as soon as you remember. If it is almost time for your next dose, wait until then and take a regular dose. Do not take extra medicine to make up for a missed dose. · Keep the medicine in the foil pouch until you are ready to use it. Store at room temperature, away from heat and direct light. Do not freeze. · Store the canister at room temperature, away from heat and direct light. Do not freeze. Do not keep this medicine inside a car where it could be exposed to extreme heat or cold. Do not poke holes in the canister or throw it into a fire, even if the canister is empty. Drugs and Foods to Avoid: Ask your doctor or pharmacist before using any other medicine, including over-the-counter medicines, vitamins, and herbal products. · Some medicines can affect how ipratropium/albuterol works. Tell your doctor if you are using any of the following: 
¨ Other inhaled medicines ¨ Beta-blocker medicine ¨ Diuretic (water pills) ¨ Medicine to treat depression or an MAO inhibitor within the past 2 weeks Warnings While Using This Medicine: · Tell your doctor if you are pregnant or breastfeeding, or if you have kidney disease, liver disease, diabetes, blood vessel problems, glaucoma, heart disease, heart rhythm problems, high blood pressure, an overactive thyroid, prostate problems, trouble urinating, or a history of seizures. · This medicine may cause the following problems: 
¨ Paradoxical bronchospasm (trouble breathing), which can be life-threatening ¨ Heart rhythm changes · Do not use this medicine for a sudden COPD attack. Make sure you always have your rescue medicine with you to treat sudden symptoms. · If any of your COPD medicines do not seem to be working as well as usual, call your doctor right away. Take all of your COPD medicines as instructed. Do not use more medicine or change your dose without asking your doctor. · This medicine may cause dizziness, drowsiness, or trouble seeing. Do not drive or do anything else that could be dangerous until you know how this medicine affects you. · Keep all medicine out of the reach of children. Never share your medicine with anyone. Possible Side Effects While Using This Medicine:  
Call your doctor right away if you notice any of these side effects: · Allergic reaction: Itching or hives, swelling in your face or hands, swelling or tingling in your mouth or throat, chest tightness, trouble breathing · Blurred vision, eye pain, seeing halos around objects · Chest pain, fast, pounding, or uneven heartbeat · Decrease in how much or how often you urinate, painful or difficult urination · Dry mouth, increased thirst, muscle cramps, nausea, vomiting · Increased trouble breathing If you notice these less serious side effects, talk with your doctor: · Nervousness, shaking If you notice other side effects that you think are caused by this medicine, tell your doctor. Call your doctor for medical advice about side effects. You may report side effects to FDA at 2-887-WFR-9992 © 2017 [facility] [place] [facility]– [place] Information is for End User's use only and may not be sold, redistributed or otherwise used for commercial purposes. The above information is an  only. It is not intended as medical advice for individual conditions or treatments. Talk to your doctor, nurse or pharmacist before following any medical regimen to see if it is safe and effective for you. Apixaban (By mouth) Apixaban (a-PIX-a-ban) Treats and prevents blood clots. This medicine is a blood thinner. Brand Name(s): Eliquis There may be other brand names for this medicine. When This Medicine Should Not Be Used: This medicine is not right for everyone. Do not use it if you had an allergic reaction to apixaban or you have active bleeding. How to Use This Medicine:  
Tablet · Your doctor will tell you how much medicine to use. Do not use more than directed. · If you are not able to swallow the tablets whole, they may be crushed and mixed in water, 5% dextrose in water (D5W), apple juice, or applesauce. The crushed tablets may be mixed with 60 mL of water or D5W dose and given through a nasogastric tube (NGT). · This medicine should come with a Medication Guide. Ask your pharmacist for a copy if you do not have one. · Missed dose: Take a dose as soon as you remember. If it is almost time for your next dose, wait until then and take a regular dose. Do not take extra medicine to make up for a missed dose. · Store the medicine in a closed container at room temperature, away from heat, moisture, and direct light. Drugs and Foods to Avoid: Ask your doctor or pharmacist before using any other medicine, including over-the-counter medicines, vitamins, and herbal products. · Some medicines can affect how apixaban works. Tell your doctor if you are using any of the following: ¨ Carbamazepine, clarithromycin, itraconazole, ketoconazole, phenytoin, rifampin, ritonavir, Sepideh's wort ¨ Blood thinner (including clopidogrel, heparin, prasugrel, warfarin) ¨ Medicine to treat depression ¨ NSAID pain or arthritis medicine (including aspirin, celecoxib, diclofenac, ibuprofen, naproxen) Warnings While Using This Medicine: · Tell your doctor if you are pregnant or breastfeeding, or if you have kidney disease, liver disease, bleeding problems, or an artificial heart valve. · Do not stop using this medicine suddenly without asking your doctor. You might have a higher risk of stroke for a short time after you stop using this medicine. · This medicine increases your risk for bleeding that can become serious if not controlled. You may also bruise easily, and it may take longer than usual for bleeding to stop. · This medicine may increase your risk for blood clots in your spine or back if you undergo an epidural or spinal puncture. This could lead to paralysis. Tell your doctor if you ever had spine problems or back surgery. · Tell any doctor or dentist who treats you that you are using this medicine. With your doctor's supervision, you may need to stop using this medicine several days before you have surgery or medical tests. · Your doctor will do lab tests at regular visits to check on the effects of this medicine. Keep all appointments. · Keep all medicine out of the reach of children. Never share your medicine with anyone. Possible Side Effects While Using This Medicine:  
Call your doctor right away if you notice any of these side effects: · Allergic reaction: Itching or hives, swelling in your face or hands, swelling or tingling in your mouth or throat, chest tightness, trouble breathing · Change in how much or how often you urinate, red or pink urine · Chest pain, trouble breathing · Coughing up blood, vomiting blood or material that looks like coffee grounds · Numbness, tingling, or muscle weakness in your legs or feet · Red or black, tarry stools · Unusual bleeding, bruising, or weakness If you notice other side effects that you think are caused by this medicine, tell your doctor. Call your doctor for medical advice about side effects. You may report side effects to FDA at 7-055-FDA-5309 © 2017 [address] Information is for End User's use only and may not be sold, redistributed or otherwise used for commercial purposes. The above information is an  only. It is not intended as medical advice for individual conditions or treatments. Talk to your doctor, nurse or pharmacist before following any medical regimen to see if it is safe and effective for you. Azithromycin (By mouth) Azithromycin (qw-umrf-upc-MYE-sin) Treats infections. This medicine is a macrolide antibiotic. Brand Name(s): Zithromax, Zithromax Tri-Brian, Zithromax Z-Brian, Zmax There may be other brand names for this medicine. When This Medicine Should Not Be Used: This medicine is not right for everyone. Do not use it if you had an allergic reaction to azithromycin, erythromycin, or similar medicines, or you have a history of liver problems caused by azithromycin. How to Use This Medicine:  
Capsule, Liquid, Packet, Powder, Tablet · Your doctor will tell you how much medicine to use. Do not use more than directed. · Take all of the medicine in your prescription to clear up your infection, even if you feel better after the first few doses. · Read and follow the patient instructions that come with this medicine. Talk to your doctor or pharmacist if you have any questions. · Multiple dose (Zithromax® oral liquid or tablets): ¨ You may take this medicine with or without food. ¨ Shake the bottle well before you measure the medicine. Measure the oral liquid medicine with a marked measuring spoon, oral syringe, or medicine cup. · Single dose (Zmax® extended-release oral liquid or powder):  
¨ Liquid: § Take this medicine on an empty stomach at least 1 hour before you eat, or 2 hours after you eat. § Call your doctor right away if you vomit within 1 hour after you take the medicine. § You must take the liquid within 12 hours after the pharmacist gives it to you. § Shake the bottle well before you measure the medicine. Measure your dose with a marked measuring spoon, cup, or syringe. ¨ Powder:  
§ Open 1 packet and pour all of the medicine into a glass with about 2 ounces (¼ cup) of water. Mix well and drink the medicine right away. Pour another 2 ounces of water into the same glass, and drink the remaining medicine. · Missed dose: If you are taking multiple doses, take the dose as soon as you remember. If it is almost time for your next dose, wait until then to take a regular dose. Do not use extra medicine to make up for a missed dose. · Store the medicine in a closed container at room temperature, away from heat, moisture, and direct light. · Extended-release oral liquid: Do not refrigerate or freeze. · Oral liquid for 1 dose only: Store at room temperature. Do not store in the refrigerator or allow the medicine to freeze. · Oral liquid for multiple doses: Store at room temperature or in the refrigerator. Use it within 10 days of filling the prescription. Drugs and Foods to Avoid: Ask your doctor or pharmacist before using any other medicine, including over-the-counter medicines, vitamins, and herbal products. · Some medicines can affect how azithromycin works. Tell your doctor if you are also using any of the following: ¨ Cyclosporine, digoxin, nelfinavir, or phenytoin ¨ Blood thinner ¨ Ergot medicine ¨ Medicine for a heart rhythm problem (including amiodarone, dofetilide, procainamide, quinidine, sotalol) · Zithromax® for multiple doses: Do not take an antacid that contains magnesium or aluminum at the same time you take Zithromax®. An antacid will affect how the medicine works. Antacids will not affect Zmax® for single dose. Warnings While Using This Medicine: · Tell your doctor if you are pregnant or breastfeeding, or if you have kidney disease, liver disease, heart disease, heart rhythm problems, heart failure, or myasthenia gravis. Tell your doctor if anyone in your family has heart rhythm problems. · This medicine may cause the following problems:  
¨ Serious skin reactions ¨ Liver problems ¨ Infantile hypertrophic pyloric stenosis ¨ Heart rhythm problems · This medicine can cause diarrhea. Call your doctor if the diarrhea becomes severe, does not stop, or is bloody. Do not take any medicine to stop diarrhea until you have talked to your doctor. Diarrhea can occur 2 months or more after you stop taking this medicine. It may occur 2 months or more after you stop using this medicine. · Call your doctor if your symptoms do not improve or if they get worse. · Keep all medicine out of the reach of children. Never share your medicine with anyone. Possible Side Effects While Using This Medicine:  
Call your doctor right away if you notice any of these side effects: · Allergic reaction: Itching or hives, swelling in your face or hands, swelling or tingling in your mouth or throat, chest tightness, trouble breathing · Blistering, peeling, red skin rash · Dark urine, pale stools, nausea, vomiting, loss of appetite, stomach pain, yellow skin or eyes · Double vision, tiredness or weakness · Fainting, dizziness, lightheadedness · Fast, pounding, or uneven heartbeat, chest pain · Feeling irritable or vomits after feeding (in babies) · Severe diarrhea that may contain blood, stomach cramps, fever If you notice these less serious side effects, talk with your doctor: · Mild diarrhea, nausea, vomiting, stomach pain If you notice other side effects that you think are caused by this medicine, tell your doctor. Call your doctor for medical advice about side effects. You may report side effects to FDA at 9-656-IQA-6229 © 2017 [address] Information is for End User's use only and may not be sold, redistributed or otherwise used for commercial purposes. The above information is an  only. It is not intended as medical advice for individual conditions or treatments. Talk to your doctor, nurse or pharmacist before following any medical regimen to see if it is safe and effective for you. Budesonide (By mouth) Budesonide (lmd-BXE-db-nide) Treats Crohn disease and ulcerative colitis. This medicine is a corticosteroid. Brand Name(s): Entocort ECRaymonderis There may be other brand names for this medicine. When This Medicine Should Not Be Used: This medicine is not right for everyone. Do not use it if you had an allergic reaction to budesonide. How to Use This Medicine:  
Long Acting Capsule, Long Acting Tablet · Your doctor will tell you how much medicine to use. Do not use more than directed. This medicine is usually taken in the morning. · Swallow the capsule or tablet whole. Do not break, crush, chew, or open it. · Keep using this medicine for the full time of treatment, even if you begin to feel better after a few days. Do not miss any doses. · Read and follow the patient instructions that come with this medicine. Talk to your doctor or pharmacist if you have any questions. · Missed dose: Take a dose as soon as you remember. If it is almost time for your next dose, wait until then and take a regular dose. Do not take extra medicine to make up for a missed dose. · Store the medicine in a closed container at room temperature, away from heat, moisture, and direct light. Drugs and Foods to Avoid: Ask your doctor or pharmacist before using any other medicine, including over-the-counter medicines, vitamins, and herbal products. · Some foods and medicines can affect how budesonide works. Tell your doctor if you are using any of the following: ¨ Cyclosporine, erythromycin, indinavir, itraconazole, ketoconazole, ritonavir, saquinavir ¨ Medicine that weakens your immune system (including steroid or cancer medicines) ¨ Stomach medicines (including cimetidine, famotidine, lansoprazole, omeprazole, pantoprazole, ranitidine) · Do not eat grapefruit or drink grapefruit juice while you are using this medicine. Warnings While Using This Medicine: · Tell your doctor if you are pregnant or breastfeeding, or if you have liver disease (including cirrhosis), diabetes, high blood pressure, osteoporosis, stomach ulcers, tuberculosis, or a family history of cataracts, diabetes, or glaucoma. Tell your doctor if you have tuberculosis (TB) or another infection, or if you have allergies. · You have a higher risk of adrenal gland problems if you use this medicine for a long time or use too much. · You may get infections more easily while you use this medicine. Avoid people who are sick or have infections. Tell your doctor right away if you have been exposed to someone who has chicken pox or measles. · This medicine may cause slow growth. If your child is using this medicine, the doctor will need to keep track of your child's height and weight. · Do not stop using this medicine suddenly. Your doctor will need to slowly decrease your dose before you stop it completely. · Tell any doctor or dentist who treats you that you are using this medicine. You may need to stop using this medicine several days before you have surgery or medical tests. · Your doctor will check your progress and the effects of this medicine at regular visits. Keep all appointments. · Keep all medicine out of the reach of children. Never share your medicine with anyone. Possible Side Effects While Using This Medicine: Call your doctor right away if you notice any of these side effects: · Allergic reaction: Itching or hives, swelling in your face or hands, swelling or tingling in your mouth or throat, chest tightness, trouble breathing · Color changes on the skin, dark freckles, easy bruising, muscle weakness · Diarrhea, nausea, vomiting, loss of appetite · Fever, chills, cough, sore throat, and body aches · Lightheadedness, dizziness, or fainting · Round, puffy face · Thicker or more hair on your body and face · Unusual tiredness or weakness If you notice these less serious side effects, talk with your doctor: · Back pain · Headache · Itchy, scaly skin rash · Runny or stuffy nose, sneezing · Stomach pain or upset, gas If you notice other side effects that you think are caused by this medicine, tell your doctor. Call your doctor for medical advice about side effects. You may report side effects to FDA at 1-296-FDA-8076 © 2017 [address]  Information is for End User's use only and may not be sold, redistributed or otherwise used for commercial purposes. The above information is an  only. It is not intended as medical advice for individual conditions or treatments. Talk to your doctor, nurse or pharmacist before following any medical regimen to see if it is safe and effective for you. Famotidine (By mouth) Famotidine (qch-DQ-dp-joe) Treats ulcers, gastroesophageal reflux disease (GERD), and conditions that cause the stomach to produce too much stomach acid. Also treats heartburn caused by acid indigestion. Brand Name(s): Acid Controller, Acid Reducer, [facility] Acid Reducer, Good Sense Acid Reducer, Heartburn Relief, Leader Acid Reducer, Pepcid, Pepcid AC, Quality Choice Acid Controller, [employer] Acid Reducer, [employer] Famotidine Acid Reducer, TopCare Acid Reducer There may be other brand names for this medicine. When This Medicine Should Not Be Used: You should not use this medicine if you have had an allergic reaction to famotidine or to similar medicines such as ranitidine (Zantac®), cimetidine (Tagamet®), or nizatidine (Axid®). How to Use This Medicine:  
Tablet, Chewable Tablet, Dissolving Tablet, Liquid · Your doctor will tell you how much medicine to use. Do not use more than directed. · Follow the instructions on the medicine label if you are using this medicine without a prescription. · The chewable tablet must be chewed completely before you swallow it. · If you are using the disintegrating tablet, make sure your hands are dry before you handle the tablet. Do not open the blister pack that contains the tablet until you are ready to take it. Remove the tablet from the blister pack by peeling back the foil, then taking the tablet out. Do not push the tablet through the foil. Place the tablet in your mouth. It should melt within 2 minutes. Swallow after the tablet has melted. · Shake the oral liquid medicine for 5 to 10 seconds before each use. Measure the medicine with a marked measuring spoon or medicine cup. If a dose is missed: · Take a dose as soon as you remember. If it is almost time for your next dose, wait until then and take a regular dose. Do not take extra medicine to make up for a missed dose. How to Store and Dispose of This Medicine: · Store the medicine in a closed container at room temperature, away from heat, moisture, and direct light. Do not freeze the oral liquid. · Ask your pharmacist, doctor, or health caregiver about the best way to dispose of any outdated medicine or medicine no longer needed. Throw away any unused oral liquid that is more than 3month old. · Keep all medicine out of the reach of children. Never share your medicine with anyone. Drugs and Foods to Avoid: Ask your doctor or pharmacist before using any other medicine, including over-the-counter medicines, vitamins, and herbal products. Warnings While Using This Medicine: · Make sure your doctor knows if you are pregnant or breastfeeding, or if you have kidney disease or liver disease. · This medicine might contain phenylalanine (aspartame). This is only a concern if you have a disorder called phenylketonuria (a problem with amino acids). If you have this condition, talk to your doctor before using this medicine. · Call your doctor if your symptoms do not improve or if they get worse. Possible Side Effects While Using This Medicine:  
Call your doctor right away if you notice any of these side effects: · Allergic reaction: Itching or hives, swelling in your face or hands, swelling or tingling in your mouth or throat, chest tightness, trouble breathing · Blistering, peeling, or red skin rash. · Dark-colored urine or pale stools. · Fast, pounding, or uneven heartbeat. · Fever, chills, cough, sore throat, and body aches. · Seizures. · Unusual bleeding, bruising, or weakness. · Yellowing of your skin or the whites of your eyes. If you notice these less serious side effects, talk with your doctor: · Constipation, diarrhea, or upset stomach. · Headache or dizziness. · Nausea or vomiting. If you notice other side effects that you think are caused by this medicine, tell your doctor. Call your doctor for medical advice about side effects. You may report side effects to FDA at 6-292-FDA-3465 © 2017 [address] Information is for End User's use only and may not be sold, redistributed or otherwise used for commercial purposes. The above information is an  only. It is not intended as medical advice for individual conditions or treatments. Talk to your doctor, nurse or pharmacist before following any medical regimen to see if it is safe and effective for you. Oxycodone/Acetaminophen (By mouth) Acetaminophen (t-tqwk-w-MIN-oh-fen), Oxycodone Hydrochloride (cl-s-SXQ-done juanita-droe-KLOR-maribeth) Treats moderate to moderately severe pain. This medicine is a narcotic pain reliever. Brand Name(s): Endocet, Percocet, Primlev, Xartemis XR There may be other brand names for this medicine. When This Medicine Should Not Be Used: This medicine is not right for everyone. Do not use it if you had an allergic reaction to acetaminophen or oxycodone, or if you have serious breathing problems or paralytic ileus. How to Use This Medicine:  
Capsule, Liquid, Tablet, Long Acting Tablet · Your doctor will tell you how much medicine to use. Do not use more than directed. · An overdose can be dangerous. Follow directions carefully so you do not get too much medicine at one time. · Oral liquid: Measure the oral liquid medicine with a marked measuring spoon, oral syringe, or medicine cup. · Swallow the extended-release tablet whole. Do not crush, break, or chew it. Do not lick or wet the tablet before placing it in your mouth. Do not give this medicine through a feeding tube. · This medicine should come with a Medication Guide. Ask your pharmacist for a copy if you do not have one. · Missed dose: If you miss a dose of this medicine, skip the missed dose and go back to your regular dosing schedule. Do not double doses. · Store the medicine in a closed container at room temperature, away from heat, moisture, and direct light. Ask your pharmacist about the best way to dispose of medicine you do not use. Drugs and Foods to Avoid: Ask your doctor or pharmacist before using any other medicine, including over-the-counter medicines, vitamins, and herbal products. · Do not use Xartemis XR if you are using or have used an MAO inhibitor in the past 14 days. · Some medicines can affect how this medicine works. Tell your doctor if you are using any of the following: ¨ Carbamazepine, erythromycin, ketoconazole, lamotrigine, mirtazapine, naltrexone, phenytoin, propranolol, rifampin, ritonavir, tramadol, trazodone, or zidovudine ¨ Birth control pills ¨ Diuretic (water pill) ¨ Medicine to treat depression ¨ Phenothiazine medicine ¨ Triptan medicine to treat migraine headaches · Do not drink alcohol while you are using this medicine. Acetaminophen can damage your liver, and alcohol can increase this risk. Do not take acetaminophen without asking your doctor if you have 3 or more drinks of alcohol every day. · Tell your doctor if you use anything else that makes you sleepy. Some examples are allergy medicine, narcotic pain medicine, and alcohol. Tell your doctor if you are using buprenorphine, butorphanol, nalbuphine, pentazocine, a benzodiazepine, or a muscle relaxer. Warnings While Using This Medicine: · Tell your doctor if you are pregnant or breastfeeding, or if you have kidney disease, liver disease, heart disease, low blood pressure, breathing problems or lung disease (such as asthma, COPD), thyroid problems, Hand disease, pancreas or gallbladder problems, prostate problems, trouble urinating, or a stomach problems, or a history of head injury or brain damage, seizures, or alcohol or drug abuse. Tell your doctor if you are allergic to codeine. · This medicine may cause the following problems: 
¨ High risk of overdose, which can lead to death ¨ Respiratory depression (serious breathing problem that can be life-threatening) ¨ Liver problems ¨ Serious skin reactions ¨ Serotonin syndrome (when used with certain medicines) · This medicine may make you dizzy or drowsy. Do not drive or do anything that could be dangerous until you know how this medicine affects you. Sit or lie down if you feel dizzy. Stand up carefully. · This medicine contains acetaminophen.  Read the labels of all other medicines you are using to see if they also contain acetaminophen, or ask your doctor or pharmacist. [facility] not use more than 4 grams (4,000 milligrams) total of acetaminophen in one day. · This medicine can be habit-forming. Do not use more than your prescribed dose. Call your doctor if you think your medicine is not working. · Do not stop using this medicine suddenly. Your doctor will need to slowly decrease your dose before you stop it completely. · This medicine could cause infertility. Talk with your doctor before using this medicine if you plan to have children. · This medicine may cause constipation, especially with long-term use. Ask your doctor if you should use a laxative to prevent and treat constipation. · Keep all medicine out of the reach of children. Never share your medicine with anyone. Possible Side Effects While Using This Medicine:  
Call your doctor right away if you notice any of these side effects: · Allergic reaction: Itching or hives, swelling in your face or hands, swelling or tingling in your mouth or throat, chest tightness, trouble breathing · Anxiety, restlessness, fast heartbeat, fever, muscle spasms, twitching, diarrhea, seeing or hearing things that are not there · Blistering, peeling, red skin rash · Blue lips, fingernails, or skin · Dark urine or pale stools, loss of appetite, stomach pain, yellow skin or eyes · Extreme weakness, shallow breathing, uneven heartbeat, seizures, sweating, or cold or clammy skin · Severe confusion, lightheadedness, dizziness, or fainting · Severe constipation, nausea, or vomiting · Trouble breathing or slow breathing If you notice these less serious side effects, talk with your doctor:  
· Headache · Mild constipation, nausea, or vomiting · Mild sleepiness or drowsiness If you notice other side effects that you think are caused by this medicine, tell your doctor. Call your doctor for medical advice about side effects. You may report side effects to FDA at 8-530-PFV-3213 © 2017 [address]  Information is for End User's use only and may not be sold, redistributed or otherwise used for commercial purposes. The above information is an  only. It is not intended as medical advice for individual conditions or treatments. Talk to your doctor, nurse or pharmacist before following any medical regimen to see if it is safe and effective for you. Prednisone (By mouth) Prednisone (PRED-ni-sone) Treats many diseases and conditions, especially problems related to inflammation. This medicine is a corticosteroid. Brand Name(s): Contrast Allergy PreMed Pack, Barbara, predniSONE Intensol There may be other brand names for this medicine. When This Medicine Should Not Be Used: This medicine is not right for everyone. Do not use if you had an allergic reaction to prednisone or if you are pregnant. How to Use This Medicine:  
Liquid, Tablet, Delayed Release Tablet · Take your medicine as directed. Your dose may need to be changed several times to find what works best for you. · It is best to take this medicine with food or milk. · Swallow the delayed-release tablet whole. Do not crush, break, or chew it. · Measure the oral liquid medicine with a marked measuring spoon, oral syringe, or medicine cup. · Missed dose: Take a dose as soon as you remember. If it is almost time for your next dose, wait until then and take a regular dose. Do not take extra medicine to make up for a missed dose. · Store the medicine in a closed container at room temperature, away from heat, moisture, and direct light. Do not freeze the oral liquid. Drugs and Foods to Avoid: Ask your doctor or pharmacist before using any other medicine, including over-the-counter medicines, vitamins, and herbal products. · Tell your doctor if you use any of the following: ¨ Aminoglutethimide, amphotericin B, carbamazepine, cholestyramine, cyclosporine, digoxin, isoniazid, ketoconazole, phenobarbital, phenytoin, or rifampin ¨ Blood thinner, such as warfarin ¨ NSAID pain or arthritis medicine, such as aspirin, diclofenac, ibuprofen, naproxen, celecoxib ¨ Diuretic (water pill) ¨ Diabetes medicine ¨ Macrolide antibiotic, such as azithromycin, clarithromycin, erythromycin ¨ Estrogen, including birth control pills or hormone replacement therapy · This medicine may interfere with vaccines. Ask your doctor before you get a flu shot or any other vaccines. Warnings While Using This Medicine: · It is not safe to take this medicine during pregnancy. It could harm an unborn baby. Tell your doctor right away if you become pregnant. · Tell your doctor if you are breastfeeding or if you have kidney problems, heart failure, high blood pressure, a recent heart attack, diabetes, glaucoma, osteoporosis, or thyroid problems. Tell your doctor about any infection you have. Also tell your doctor if you have had mental or emotional problems (such as depression) or stomach or bowel problems (such as an ulcer or diverticulitis). · This medicine may cause the following problems: ¨ Mood or behavior changes ¨ Higher blood pressure, retaining water, changes in salt or potassium levels in your body ¨ Cataracts or glaucoma (with long-term use) ¨ Weak bones or osteoporosis (with long-term use) ¨ Slow growth in children (with long-term use) ¨ Muscle problems (with high doses, especially if you have myasthenia gravis or similar nerve and muscle problems) · Do not stop using this medicine suddenly. Your doctor will need to slowly decrease your dose before you stop it completely. · This medicine could cause you to get infections more easily. Tell your doctor right away if you are exposed to chicken pox, measles, or other serious infection. Tell your doctor if you had a serious infection in the past, such as tuberculosis or herpes. · Tell your doctor about any extra stress or anxiety in your life. Your dose might need to be changed for a short time. · Tell any doctor or dentist who treats you that you are using this medicine. This medicine may affect certain medical test results. · Keep all medicine out of the reach of children. Never share your medicine with anyone. Possible Side Effects While Using This Medicine:  
Call your doctor right away if you notice any of these side effects: · Allergic reaction: Itching or hives, swelling in your face or hands, swelling or tingling in your mouth or throat, chest tightness, trouble breathing · Dark freckles, skin color changes, coldness, weakness, tiredness, nausea, vomiting, weight loss · Depression, unusual thoughts, feelings, or behaviors, trouble sleeping · Fever, chills, cough, sore throat, and body aches · Muscle pain or weakness · Rapid weight gain, swelling in your hands, ankles, or feet · Severe stomach pain, nausea, vomiting, or red or black stools · Skin changes or growths · Trouble seeing, eye pain, headache If you notice these less serious side effects, talk with your doctor: · Increased appetite · Round, puffy face · Weight gain around your neck, upper back, breast, face, or waist 
If you notice other side effects that you think are caused by this medicine, tell your doctor. Call your doctor for medical advice about side effects. You may report side effects to FDA at 5-428-FDA-5694 © 2017 [address] Information is for End User's use only and may not be sold, redistributed or otherwise used for commercial purposes. The above information is an  only. It is not intended as medical advice for individual conditions or treatments. Talk to your doctor, nurse or pharmacist before following any medical regimen to see if it is safe and effective for you. Please provide this summary of care documentation to your next provider. Signatures-by signing, you are acknowledging that this After Visit Summary has been reviewed with you and you have received a copy. Patient Signature:  ____________________________________________________________ Date:  ____________________________________________________________  
  
Marvetta Land Provider Signature:  ____________________________________________________________ Date:  ____________________________________________________________

## 2017-11-11 NOTE — ED NOTES
Pt family bedside. Pt in NAD at this time and both side rails are up. Pt given a pack of david crackers and a small glass of ice water. Pt tolerating medicine appropriately.

## 2017-11-11 NOTE — ED NOTES
I have reviewed discharge instructions with the patient. The patient verbalized understanding. Patient looks comfortable, left ED in stable condition. Patient provided with prescription to follow up with vascular test to rule out DVT. Patient denies any new complaints at this time. Ambulatory, steady gait noted.

## 2017-11-11 NOTE — ED NOTES
Hourly rounding complete. Safety  Pt resting   [x  ]  On stretcher with side rails up and bed in locked position, call bell within reach  [  ]  Sitting in chair with casters locked, call bell within reach    Toileting  x[  ] pt denies need to use bathroom  [  ] pt assisted to bathroom  [  ] pt assisted with bedpan  [  ] pt independent to bathroom as needed    Ongoing Plan of Care  Plan of care and expected time for test and results reviewed with pt.     Pain Management / Comfort  [  ] dimmed lights  [  ] warm blanket provided  [  ] pain assessed  [x  ] monitor alarms reviewed

## 2017-11-11 NOTE — ED NOTES
TRANSFER - OUT REPORT:    Verbal report given to Yahaira Saenz RN (name) on Paul Gallagher  being transferred to [address] [place] [address] Drive (unit) for routine progression of care       Report consisted of patients Situation, Background, Assessment and   Recommendations(SBAR). Information from the following report(s) SBAR, ED Summary, [facility] and Recent Results was reviewed with the receiving nurse. Lines:   Peripheral IV 11/11/17 Right Antecubital (Active)   Site Assessment Clean, dry, & intact 11/11/2017 11:11 AM   Phlebitis Assessment 0 11/11/2017 11:11 AM   Infiltration Assessment 0 11/11/2017 11:11 AM   Dressing Status Clean, dry, & intact 11/11/2017 11:11 AM   Dressing Type Transparent 11/11/2017 11:11 AM   Hub Color/Line Status Green;Flushed;Patent 11/11/2017 11:11 AM   Action Taken Blood drawn 11/11/2017 11:11 AM        Opportunity for questions and clarification was provided.

## 2017-11-11 NOTE — IP AVS SNAPSHOT
[address] [address] Elif Sánchez Patient: Bia Da Silva MRN: [id number] :1990 My Medications STOP taking these medications   
 ipratropium-albuterol  mcg/actuation inhaler Commonly known as:  Benna Idler Replaced by:  albuterol-ipratropium 2.5 mg-0.5 mg/3 ml Nebu TAKE these medications as instructed Instructions Each Dose to Equal  
 Morning Noon Evening Bedtime  
 albuterol 90 mcg/actuation inhaler Commonly known as:  PROVENTIL HFA, VENTOLIN HFA, PROAIR HFA Your last dose was: Your next dose is: Take 1 Puff by inhalation every four (4) hours as needed for Wheezing. 1 Puff  
    
   
   
   
  
 albuterol-ipratropium 2.5 mg-0.5 mg/3 ml Nebu Commonly known as:  Bahman Parry Your last dose was: Your next dose is:    
   
   
 3 mL by Nebulization route every four (4) hours as needed. 3 mL  
    
   
   
   
  
 apixaban 5 mg tablet Commonly known as:  Ade Coon Your last dose was: Your next dose is: Take 2 Tabs by mouth two (2) times a day for 5 days. (10 doses). Then 1 tab twice daily afterwards for 3 months. 10 mg  
    
   
   
   
  
 azithromycin 250 mg tablet Commonly known as:  Luzmaria Blackbird Your last dose was: Your next dose is: Take 1 Tab by mouth daily for 2 days. 250 mg  
    
   
   
   
  
 budesonide 0.5 mg/2 mL Nbsp Commonly known as:  PULMICORT Your last dose was: Your next dose is:    
   
   
 2 mL by Nebulization route two (2) times a day. 500 mcg  
    
   
   
   
  
 famotidine 20 mg tablet Commonly known as:  PEPCID Your last dose was: Your next dose is: Take 1 Tab by mouth every twelve (12) hours. 20 mg  
    
   
   
   
  
 guaiFENesin  mg ER tablet Commonly known as:  [employer]an [employer] Dean Your last dose was: Your next dose is: Take 1 Tab by mouth two (2) times a day. 600 mg  
    
   
   
   
  
 * OTHER Your last dose was: Your next dose is:    
   
   
 Left LE Doppler ultrasound  Indications: LLE DVT  
     
   
   
   
  
 * OTHER Your last dose was: Your next dose is:    
   
   
 Left LE Doppler ultrasound in 3 months. Indications: LLE DVT  
     
   
   
   
  
 oxyCODONE-acetaminophen  mg per tablet Commonly known as:  PERCOCET 10 Your last dose was: Your next dose is: Take 1 Tab by mouth every four (4) hours as needed. Max Daily Amount: 6 Tabs. 1 Tab * predniSONE 20 mg tablet Commonly known as:  Jaimie Stapleton Start taking on:  11/15/2017 Your last dose was: Your next dose is: Take 2 Tabs by mouth daily (with breakfast) for 3 days. 40 mg  
    
   
   
   
  
 * predniSONE 20 mg tablet Commonly known as:  Jaimie Stapleton Start taking on:  11/18/2017 Your last dose was: Your next dose is: Take 1 Tab by mouth daily (with breakfast) for 3 days. 20 mg  
    
   
   
   
  
 * predniSONE 10 mg tablet Commonly known as:  Jaimie 1bib Start taking on:  11/21/2017 Your last dose was: Your next dose is: Take 1 Tab by mouth daily (with breakfast) for 3 days. 10 mg  
    
   
   
   
  
 * Notice: This list has 5 medication(s) that are the same as other medications prescribed for you. Read the directions carefully, and ask your doctor or other care provider to review them with you. Where to Get Your Medications Information on where to get these meds will be given to you by the nurse or doctor. ! Ask your nurse or doctor about these medications  
  albuterol 90 mcg/actuation inhaler  
 albuterol-ipratropium 2.5 mg-0.5 mg/3 ml Nebu  
 apixaban 5 mg tablet  
 azithromycin 250 mg tablet budesonide 0.5 mg/2 mL Nbsp  
 famotidine 20 mg tablet OTHER  
 OTHER  
 oxyCODONE-acetaminophen  mg per tablet  
 predniSONE 10 mg tablet  
 predniSONE 20 mg tablet  
 predniSONE 20 mg tablet

## 2017-11-11 NOTE — ED NOTES
Bedside shift change report given to Lucas Rivera  (oncoming nurse) by Nishant Serrano RN  (offgoing nurse). Report included the following information SBAR, ED Summary, Intake/Output, MAR, Recent Results and Cardiac Rhythm ST. Pt is on stretcher, in gown, on monitor. Pt has IV access with rainbow sent to lab. Pt rating left lower calf pain 15/10. Pt denies chest pain and SOB. Pt denies recent travels.

## 2017-11-11 NOTE — PROGRESS NOTES
Pt received from ED alert and oriented X4 with c/o9 10/10 chest pain. Stat percocet for [id number] not given in ED. Morphine 2mg IV given. VSS. Admission in progress.

## 2017-11-11 NOTE — ED NOTES
Pt awake in bed in NAD at this time, Right side rail is up and left is down. Pt in NAD at this time. I provided him a blanket.

## 2017-11-11 NOTE — ED TRIAGE NOTES
Pt. states \"I'm having a bad pain in the back of my leg\" refers to left calf. Reports symtoms started \"last night\".

## 2017-11-11 NOTE — ED TRIAGE NOTES
Pt stated left lower calf pain started last night. States he was just laying down and it woke him up, says he stood up and almost fell. Patient states he is still having difficulty walking. Patient denies long travel recently. States he stopped smoking two weeks ago. Patient AOx4.

## 2017-11-11 NOTE — PROCEDURES
[facility] ***    Name: Susie Wolf  MRN: [id number]    Outpatient  : 16 Oct 1990  HIS Order #: [id number]  [address] [facility] Visit #: [id number]  Date: 2017    TYPE OF TEST: Peripheral Venous Testing    REASON FOR TEST  Pain in limb, Limb swelling    Left Leg:-  Deep venous thrombosis:           Yes  Proximal extent of thrombus:      Soleal  Superficial venous thrombosis:    No  Deep venous insufficiency:        Not examined  Superficial venous insufficiency: Not examined      INTERPRETATION/FINDINGS  Duplex images were obtained using 2-D gray scale, color flow, and  spectral Doppler analysis. Left leg :  1. Acute occlusive deep venous thrombosis identified in the soleal  vein. 2. Deep veins visualized include the common femoral, femoral,  popliteal, posterior tibial and peroneal veins. 3. No evidence of superficial thrombosis detected. 4. Superficial veins visualized include the proximal great saphenous  vein. 5. No evidence of deep vein thrombosis in the contralateral common  femoral vein. ADDITIONAL COMMENTS  Results reported to Raul Choi RN in ED triage. I have personally reviewed the data relevant to the interpretation of  this  study.     TECHNOLOGIST: Dana Saleem RVT  Signed: 2017 10:38:17 AM    PHYSICIAN: Franca Albarado MD  Signed: 2017 8:03:39 AM

## 2017-11-11 NOTE — DISCHARGE INSTRUCTIONS
SPECIFIC PATIENT INSTRUCTIONS FROM THE PHYSICIAN WHO TREATED YOU IN THE ER TODAY:  1. Return if any concerns or worsening of condition(s). 2. You were given a prescription ordering a PVL study to be done tomorrow morning at [facility] radiology department. You must go to [facility] radiology department in the morning to have this study done. MyChart Activation    Thank you for requesting access to [facility]. Please follow the instructions below to securely access and download your online medical record. [facility] allows you to send messages to your doctor, view your test results, renew your prescriptions, schedule appointments, and more. How Do I Sign Up? 1. In your internet browser, go to [email or web address]. 2. Click on the First Time User? Click Here link in the Sign In box. You will see the New Member Sign Up page. 3. Enter your [facility] Access Code exactly as it appears below. You will not need to use this code after youve completed the sign-up process. If you do not sign up before the expiration date, you must request a new code. [facility] Access Code: [email or web address]  Expires: 2018  1:26 PM (This is the date your [facility] access code will )    4. Enter the last four digits of your Social Security Number (xxxx) and Date of Birth (mm/dd/yyyy) as indicated and click Submit. You will be taken to the next sign-up page. 5. Create a [facility] ID. This will be your [facility] login ID and cannot be changed, so think of one that is secure and easy to remember. 6. Create a [facility] password. You can change your password at any time. 7. Enter your Password Reset Question and Answer. This can be used at a later time if you forget your password. 8. Enter your e-mail address. You will receive e-mail notification when new information is available in [address]. 9. Click Sign Up. You can now view and download portions of your medical record.   10. Click the Download Summary menu link to download a portable copy of your medical information. Additional Information    If you have questions, please visit the Frequently Asked Questions section of the [employer] website at [email or web address] Remember, [facility] is NOT to be used for urgent needs. For medical emergencies, dial 911.

## 2017-11-11 NOTE — Clinical Note
Status[de-identified] Inpatient [101] Type of Bed: Telemetry [19] Inpatient Hospitalization Certified Necessary for the Following Reasons: 3. Patient receiving treatment that can only be provided in an inpatient setting (further clarification in H&P documentation) Admitting Diagnosis: Pulmonary embolism ([facility] [address] [104444] Admitting Physician: Timothy Thayer [[id number]] Attending Physician: Timothy Thayer [[id number]] Estimated Length of Stay: 2 Midnights Discharge Plan[de-identified] Home with Office Follow-up

## 2017-11-11 NOTE — ED PROVIDER NOTES
HPI Comments: 11:40 AM Mychal Bernstein is a 32 y.o. male with h/o Asthma who presents to ED complaining of blood clot in left leg onset today. Pt was seen last night in ED for left calf pain, worsened by walking, told to return in the morning for an outpatient leg ultrasound. The US showed a clot and pt was sent to the ED by the outpatient vascular clinic. Pt has asthma and was hospitalized for 4 days last week for an acute asthma attack. He has not had his prednisone for the last three days and is feeling some tightness in his chest and wheezing. Pt admits to ETOH use. Pt denies chest pain, SOB, tobacco use, drug use. No other concerns or symptoms at this time. PCP: Gladys Bailey MD      The history is provided by the patient. Past Medical History:   Diagnosis Date    Asthma     Thromboembolus [facility])        History reviewed. No pertinent surgical history. History reviewed. No pertinent family history. Social History     Social History    Marital status:      Spouse name: N/A    Number of children: N/A    Years of education: N/A     Occupational History    Not on file. Social History Main Topics    Smoking status: Former Smoker    Smokeless tobacco: Never Used    Alcohol use Yes    Drug use: No    Sexual activity: Not on file     Other Topics Concern    Not on file     Social History Narrative         ALLERGIES: Review of patient's allergies indicates no known allergies. Review of Systems   Constitutional: Negative for chills and fever. Respiratory: Positive for chest tightness and wheezing. Negative for shortness of breath. Cardiovascular: Positive for leg swelling (left leg). Negative for chest pain. Gastrointestinal: Negative for diarrhea, nausea and vomiting. Neurological: Negative for headaches. All other systems reviewed and are negative.       Vitals:    11/11/17 1036   BP: 140/86   Pulse: 98   Resp: 16   Temp: 98.6 °F (37 °C)   SpO2: 96%   Weight: 149.5 kg (329 lb 9.6 oz)   Height: 6' 4\" (1.93 m)            Physical Exam   Constitutional: He is oriented to person, place, and time. He appears well-developed and well-nourished. No distress. HENT:   Head: Normocephalic and atraumatic. Eyes: Conjunctivae and EOM are normal. Right eye exhibits no discharge. Left eye exhibits no discharge. No scleral icterus. Neck: Normal range of motion. Neck supple. No tracheal deviation present. Cardiovascular: Normal rate, regular rhythm and normal heart sounds. No murmur heard. Pulmonary/Chest: Effort normal. No respiratory distress. He has wheezes (expiratory) in the right upper field, the right middle field, the right lower field, the left upper field, the left middle field and the left lower field. He has no rales. Abdominal: Soft. He exhibits no distension. There is no tenderness. There is no rebound and no guarding. Musculoskeletal: Normal range of motion. He exhibits no edema or deformity. Left lower leg: He exhibits tenderness and swelling. Neurological: He is alert and oriented to person, place, and time. No cranial nerve deficit. Skin: Skin is warm and dry. He is not diaphoretic. Psychiatric: He has a normal mood and affect. His behavior is normal. Judgment and thought content normal.        MDM  Number of Diagnoses or Management Options  Other pulmonary embolism without acute cor pulmonale, unspecified chronicity ([facility] [address]):   Diagnosis management comments: Pt with + DVT and b/l subsegmental PE's. Given Lovenox, admitted to hospitalist.  No signs of acute right heart strain.      ED Course       Procedures    Vitals:  Patient Vitals for the past 12 hrs:   Temp Pulse Resp BP SpO2   11/11/17 1036 98.6 °F (37 °C) 98 16 140/86 96 %       Medications ordered:   Medications   predniSONE (DELTASONE) tablet 60 mg (60 mg Oral Given 11/11/17 1124)   albuterol (PROVENTIL VENTOLIN) nebulizer solution 5 mg (5 mg Nebulization Given 11/11/17 1124) oxyCODONE-acetaminophen (PERCOCET) 5-325 mg per tablet 1 Tab (1 Tab Oral Given 11/11/17 1133)   azithromycin (ZITHROMAX) tablet 250 mg (250 mg Oral Given 11/11/17 1213)   albuterol CONCENTRATE 2.5mg/0.5 mL neb soln (5 mg Nebulization Given 11/11/17 1426)   ipratropium (ATROVENT) 0.02 % nebulizer solution 0.5 mg (0.5 mg Nebulization Given 11/11/17 1426)   iopamidol (ISOVUE-370) 76 % injection  mL (100 mL IntraVENous Given 11/11/17 1315)   enoxaparin (LOVENOX) injection 100 mg (100 mg SubCUTAneous Given 11/11/17 1448)         Lab findings:  Recent Results (from the past 12 hour(s))   EKG, 12 LEAD, INITIAL    Collection Time: 11/11/17 10:48 AM   Result Value Ref Range    Ventricular Rate 86 BPM    Atrial Rate 86 BPM    P-R Interval 134 ms    QRS Duration 92 ms    Q-T Interval 344 ms    QTC Calculation (Bezet) 411 ms    Calculated P Axis 51 degrees    Calculated R Axis 77 degrees    Calculated T Axis 44 degrees    Diagnosis       Normal sinus rhythm  Septal infarct (cited on or before 09-MAY-2012)  Abnormal ECG  When compared with ECG of 04-NOV-2017 10:59,  T wave inversion no longer evident in Inferior leads  Confirmed by Jorge De Leon [id number]) on 11/11/2017 11:36:12 AM     CBC WITH AUTOMATED DIFF    Collection Time: 11/11/17 11:05 AM   Result Value Ref Range    WBC 8.3 4.6 - 13.2 K/uL    RBC 5.50 4.70 - 5.50 M/uL    HGB 15.9 13.0 - 16.0 g/dL    HCT 45.2 36.0 - 48.0 %    MCV 82.2 74.0 - 97.0 FL    MCH 28.9 24.0 - 34.0 PG    MCHC 35.2 31.0 - 37.0 g/dL    RDW 14.8 (H) 11.6 - 14.5 %    PLATELET 403 000 - 883 K/uL    MPV 10.4 9.2 - 11.8 FL    NEUTROPHILS 49 40 - 73 %    LYMPHOCYTES 36 21 - 52 %    MONOCYTES 10 3 - 10 %    EOSINOPHILS 5 0 - 5 %    BASOPHILS 0 0 - 2 %    ABS. NEUTROPHILS 4.1 1.8 - 8.0 K/UL    ABS. LYMPHOCYTES 3.0 0.9 - 3.6 K/UL    ABS. MONOCYTES 0.8 0.05 - 1.2 K/UL    ABS. EOSINOPHILS 0.4 0.0 - 0.4 K/UL    ABS.  BASOPHILS 0.0 0.0 - 0.1 K/UL    DF AUTOMATED     METABOLIC PANEL, BASIC    Collection Time: 11/11/17 11:05 AM   Result Value Ref Range    Sodium 142 136 - 145 mmol/L    Potassium 4.2 3.5 - 5.5 mmol/L    Chloride 105 100 - 108 mmol/L    CO2 31 21 - 32 mmol/L    Anion gap 6 3.0 - 18 mmol/L    Glucose 84 74 - 99 mg/dL    BUN 17 7.0 - 18 MG/DL    Creatinine 1.11 0.6 - 1.3 MG/DL    BUN/Creatinine ratio 15 12 - 20      GFR est AA >60 >60 ml/min/1.73m2    GFR est non-AA >60 >60 ml/min/1.73m2    Calcium 8.8 8.5 - 10.1 MG/DL   TROPONIN I    Collection Time: 11/11/17 11:05 AM   Result Value Ref Range    Troponin-I, Qt. <0.02 0.0 - 0.045 NG/ML       EKG interpretation by ED Physician:10:50 AM NSR at rate of 86, q-waves in VI, VII, no acute ST or T-wave changes suggestive of acute ischemia      Progress notes, Consult notes or additional Procedure notes:   12:27 PM, re-eval pt is complaining of persistent SOB despite nebulizer treatment will get CT to rule out PE, discussed treatment plan  Consult:  Discussed care with Dr Rosa Roque, Specialty: Hosptialist  Standard discussion; including history of patients chief complaint, available diagnostic results, and treatment course. Will admit. 2:35 PM, 11/11/2017     Disposition:  Diagnosis:   1. Other pulmonary embolism without acute cor pulmonale, unspecified chronicity (HCC)        Disposition: admit    Follow-up Information     None           Patient's Medications   Start Taking    No medications on file   Continue Taking    AZITHROMYCIN (ZITHROMAX) 250 MG TABLET    Take 1 Tab by mouth daily for 5 days. GUAIFENESIN ER (MUCINEX) 600 MG ER TABLET    Take 1 Tab by mouth two (2) times a day. IPRATROPIUM-ALBUTEROL (COMBIVENT RESPIMAT)  MCG/ACTUATION INHALER    Take 1 Puff by inhalation every six (6) hours. PREDNISONE (DELTASONE) 20 MG TABLET    Take 2 Tabs by mouth daily (with breakfast). For 3 days.    These Medications have changed    No medications on file   Stop Taking    No medications on file         Alfred Hinkle 96. acting as a scribe for and in the presence of Ubaldo Awad MD      November 11, 2017 at 11:55 AM       Provider Attestation:      I personally performed the services described in the documentation, reviewed the documentation, as recorded by the scribe in my presence, and it accurately and completely records my words and actions.  November 11, 2017 at 11:55 AM - Ubaldo Awad MD

## 2017-11-11 NOTE — ED NOTES
As soon as pt falls asleep his HR gradually increases and decreases from . Pt states he has mild chest discomfort, but declines any pain medicines right now. Follow up 12-lead completed and it showed NSR. Pt requested PB& J as well as Ginger Ale.

## 2017-11-11 NOTE — H&P
Hospitalist Admission History and Physical    NAME:  Chrissy Lopez   :   1990   MRN:   [id number]     PCP:  Laina Hercules MD  Date/Time:  2017 4:17 PM  Subjective:   CHIEF COMPLAINT:  Left leg pain and dyspnea    HISTORY OF PRESENT ILLNESS:     Ed Bashir is a 32 y.o. male  with history of asthma presented to ER yesterday c/o sudden onset of constant severe nonradiating left calf pain he was sent home to return today for doppler US due to no tech was found to have a left soleal DVT. He also c/o mild to moderate shortness of breath which started this morning. He was previously seen for asthma exacerbation and has been taking steroids, however, the dyspnea started this morning along with chest pain especially on the right with varies with breathing. His mother at bedside states that she believes she had a DVT in her leg 21 years ago but was never treated due to abnormal vaginal bleeding. She reports no other family history. Past Medical History:   Diagnosis Date    Asthma     Thromboembolus [facility])         History reviewed. No pertinent surgical history. Social History   Substance Use Topics    Smoking status: Former Smoker    Smokeless tobacco: Never Used    Alcohol use Yes        Family history:  MOther had DVT 21 years ago. No Known Allergies     Prior to Admission Medications   Prescriptions Last Dose Informant Patient Reported? Taking? azithromycin (ZITHROMAX) 250 mg tablet 11/10/2017 at Unknown time  No Yes   Sig: Take 1 Tab by mouth daily for 5 days. guaiFENesin ER (MUCINEX) 600 mg ER tablet 11/10/2017 at Unknown time  No Yes   Sig: Take 1 Tab by mouth two (2) times a day. ipratropium-albuterol (COMBIVENT RESPIMAT)  mcg/actuation inhaler 11/10/2017 at Unknown time  No Yes   Sig: Take 1 Puff by inhalation every six (6) hours. predniSONE (DELTASONE) 20 mg tablet 11/10/2017 at Unknown time  No Yes   Sig: Take 2 Tabs by mouth daily (with breakfast).  For 3 days. Facility-Administered Medications: None       REVIEW OF SYSTEMS:    []Unable to obtain  ROS due to  []mental status change  []sedated   []intubated  [x]Total of 12 systems reviewed as follows:  Const:  [x]negative []fever []chills  []weakness  []weight loss  Eyes:  []negative []visual changes  []dry eyes  []eye pain   ENT:  []negative[]sore throat  []tongue or lip swelling  Resp:  []negative []cough   []sputum [x]dyspnea  [x]wheezing  []ZAMUDIO  Cards:  []negative [x]chest pain  []palpitations  []lower extremity edema  GI:  [x]negative []nausea  []vomiting  []diarrhea  []abdominal pain  :  [x]negative []frequency  []dysuria  []urethral discharge  Skin:  []negative []rash []pruritus  Hem:  []negative []easy bruising and gum/nose bleeding  Musc:  []negative []myalgias []back pain [x]left calf pain but no swelling  Neuro:  []negative []headaches []dizziness []vertigo []slurred speech []CVA symptoms  Psych:  []negative []anxiety []depression []suicidal []homicidal  [x]All other symptoms reviewed and were negative        Objective:   VITALS:    Visit Vitals    /63    Pulse 94    Temp 98.6 °F (37 °C)    Resp 21    Ht 6' 4\" (1.93 m)    Wt 149.5 kg (329 lb 9.6 oz)    SpO2 94%    BMI 40.12 kg/m2     Temp (24hrs), Av.4 °F (36.9 °C), Min:98 °F (36.7 °C), Max:98.7 °F (37.1 °C)      PHYSICAL EXAM:   Gen:  [x]Alert [x]cooperative [x]NAD []not alert or oriented []distress:___. Head:  [x]Normocephalic [x]without obvious abnormality, [x]atraumatic. Eyes:  [x]Conjunctivae clear []anicteric sclerae [x]Pupils are equal [x]EOMI  Nose:  [x]Nares normal [x]No drainage []sinus tenderness  Throat:   [x]Lips, mucosa, and tongue normal.  Neck:  [x]Supple [x]symmetrical [x]no adenopathy [x]no carotid bruit [x]no JVD. Back:  [x]Symmetric [x]No CVA tenderness. Lungs:  []CTA []good BS  [x]diminished bilat. [x]faint wheezing []rhonchi bilat. []bibasilar crackles.   Chest wall:  [x]No tenderness []No Accessory muscle use. Heart:  []S1S2 no MGR [x]S1-S2 (+)[]RRR []No murmur []Irr-Irr [x]tachy []murmur  Abd:  [x]SNTBS (+) []soft []rigid []not distended []tender: ___ []no rebound []no guarding. Ext:  [x]no cyanosis [x]no edema []clubbing [x]tenderness left calf  Skin:  [x]Texture normal []turgor normal []No rashes []no jaundiced  Lymph:  [x]Cervical normal []supraclavicular normal.  Psych:  [x]Good insight []not depressed []not anxious []not agitated [] No SI or HI []Unable to assess  Neuro:  [x]CN 2-12 intact [x]No facial asymmetry []speech normal []Normal strength [x]AAOx4  Additional pertinent findings:      LAB DATA REVIEWED:    No components found for: [place]  Recent Labs      11/11/17   1105  11/10/17   2159   NA  142  141   K  4.2  4.1   CL  105  108   CO2  31  27   BUN  17  18   CREA  1.11  1.26   GLU  84  106*   CA  8.8  8.2*   ALB   --   3.0*   WBC  8.3  10.8   HGB  15.9  15.3   HCT  45.2  43.3   PLT  213  189         IMAGING RESULTS:   [x]  I have personally reviewed the actual   []CXR  []AXR  [x]CT scan  [] MRI    CTA chest:  Exam is positive for subsegmental lower lobe pulmonary emboli bilaterally. US LE:  1. Acute occlusive deep venous thrombosis identified in the soleal vein. 2. Deep veins visualized include the common femoral, femoral, popliteal, posterior tibial and peroneal veins. 3. No evidence of superficial thrombosis detected. 4. Superficial veins visualized include the proximal great saphenous vein. 5. No evidence of deep vein thrombosis in the contralateral common femoral vein. Assessment/Plan:   1. Acute bilateral pulmonary embolism  2. Acute left soleal DVT  3. Acute asthma exacerbation mild  4.  Acute dyspnea multifactorial astham but most concerning due to PE.      ___________________________________________________  PLAN:    Risk of deterioration:  []Low    []Moderate  [x]High  -  Admit for further workup and treatment.   -  Start anticoagulation therapy  -  Will monitor labs as outlined below. -  IV Fluid hydration. Meds:   -  Lovenox as bridge  -  I discussed oral anticoagulants and they will decide on NOAC or coumadin.  -  Address pain control in a stepwise process ranging from tylenol to IV dilaudid or IV morphine depending on intensity.  -  Continue with bronchodilators  -  Continue with inhaled corticosteroids  -  Continue with corticosteroids  -  Continue with oxygen therapy per protocol  -  Continue with antibiotics  Labs:  CMP, CBC, TSH, Hypercoagulable workup, PT/INR  Drug levels: n/a  Cultures:  n/a  Extras:    -  CTA done  -  LE Doppler done  Consult(s):  -  [facility]  -  Consider hematology    **Chart reviewed including medications, vitals, notes, labs and pertinent studies. **Assessment, condition, and plan reviewed and all questions and concerns addressed.     Patient will need to receive treatment that can only be done on an inpatient basis which will require at 2 or more midnights due to risk of possible complications from PE which may result in respiratory failure, cardiovascular complication and/or death    Prophylaxis:  [x]Lovenox Therapeutic []Coumadin  []Hep SQ  []SCDs  [x]H2B/PPI    Disposition:  [x]Home w/ Family   [] PT,OT,RN   []SNF/LTC   []SAH/Rehab    Discussed Code Status:   [x]Full Code      []DNR      []DNI     ___________________________________________________    Care Plan discussed with:    [x]Patient   [x]Family    []ED Care Manager  []ED Doc   []Specialist :    Total Time Coordinating Admission:      minutes    []Total Critical Care Time:     ___________________________________________________  Admitting Physician: Elizabeth Lima MD

## 2017-11-12 LAB
ALBUMIN SERPL-MCNC: 3.2 G/DL (ref 3.4–5)
ALBUMIN/GLOB SERPL: 0.9 {RATIO} (ref 0.8–1.7)
ALP SERPL-CCNC: 48 U/L (ref 45–117)
ALT SERPL-CCNC: 22 U/L (ref 16–61)
ANION GAP SERPL CALC-SCNC: 11 MMOL/L (ref 3–18)
APTT PPP: 31.9 SEC (ref 23–36.4)
AST SERPL-CCNC: 8 U/L (ref 15–37)
BASOPHILS # BLD: 0 K/UL (ref 0–0.06)
BASOPHILS NFR BLD: 0 % (ref 0–2)
BILIRUB SERPL-MCNC: 0.7 MG/DL (ref 0.2–1)
BNP SERPL-MCNC: 14 PG/ML (ref 0–450)
BUN SERPL-MCNC: 11 MG/DL (ref 7–18)
BUN/CREAT SERPL: 11 (ref 12–20)
CALCIUM SERPL-MCNC: 8.8 MG/DL (ref 8.5–10.1)
CHLORIDE SERPL-SCNC: 105 MMOL/L (ref 100–108)
CO2 SERPL-SCNC: 25 MMOL/L (ref 21–32)
CREAT SERPL-MCNC: 1.01 MG/DL (ref 0.6–1.3)
DIFFERENTIAL METHOD BLD: ABNORMAL
EOSINOPHIL # BLD: 0 K/UL (ref 0–0.4)
EOSINOPHIL NFR BLD: 0 % (ref 0–5)
ERYTHROCYTE [DISTWIDTH] IN BLOOD BY AUTOMATED COUNT: 14.7 % (ref 11.6–14.5)
GLOBULIN SER CALC-MCNC: 3.7 G/DL (ref 2–4)
GLUCOSE SERPL-MCNC: 104 MG/DL (ref 74–99)
HCT VFR BLD AUTO: 44.6 % (ref 36–48)
HGB BLD-MCNC: 15.5 G/DL (ref 13–16)
INR PPP: 1 (ref 0.8–1.2)
LYMPHOCYTES # BLD: 2.8 K/UL (ref 0.9–3.6)
LYMPHOCYTES NFR BLD: 19 % (ref 21–52)
MCH RBC QN AUTO: 28.5 PG (ref 24–34)
MCHC RBC AUTO-ENTMCNC: 34.8 G/DL (ref 31–37)
MCV RBC AUTO: 82 FL (ref 74–97)
MONOCYTES # BLD: 1.2 K/UL (ref 0.05–1.2)
MONOCYTES NFR BLD: 8 % (ref 3–10)
NEUTS SEG # BLD: 11 K/UL (ref 1.8–8)
NEUTS SEG NFR BLD: 73 % (ref 40–73)
PLATELET # BLD AUTO: 200 K/UL (ref 135–420)
PMV BLD AUTO: 11.1 FL (ref 9.2–11.8)
POTASSIUM SERPL-SCNC: 3.7 MMOL/L (ref 3.5–5.5)
PROT SERPL-MCNC: 6.9 G/DL (ref 6.4–8.2)
PROTHROMBIN TIME: 13 SEC (ref 11.5–15.2)
RBC # BLD AUTO: 5.44 M/UL (ref 4.7–5.5)
SODIUM SERPL-SCNC: 141 MMOL/L (ref 136–145)
WBC # BLD AUTO: 15 K/UL (ref 4.6–13.2)

## 2017-11-12 PROCEDURE — 74011250637 HC RX REV CODE- 250/637: Performed by: HOSPITALIST

## 2017-11-12 PROCEDURE — 36415 COLL VENOUS BLD VENIPUNCTURE: CPT | Performed by: HOSPITALIST

## 2017-11-12 PROCEDURE — 65660000000 HC RM CCU STEPDOWN

## 2017-11-12 PROCEDURE — 85025 COMPLETE CBC W/AUTO DIFF WBC: CPT | Performed by: HOSPITALIST

## 2017-11-12 PROCEDURE — 74011250637 HC RX REV CODE- 250/637: Performed by: NURSE PRACTITIONER

## 2017-11-12 PROCEDURE — 74011000250 HC RX REV CODE- 250: Performed by: HOSPITALIST

## 2017-11-12 PROCEDURE — 74011250636 HC RX REV CODE- 250/636: Performed by: HOSPITALIST

## 2017-11-12 PROCEDURE — 80053 COMPREHEN METABOLIC PANEL: CPT | Performed by: HOSPITALIST

## 2017-11-12 PROCEDURE — 74011250637 HC RX REV CODE- 250/637: Performed by: PHYSICIAN ASSISTANT

## 2017-11-12 PROCEDURE — 83880 ASSAY OF NATRIURETIC PEPTIDE: CPT | Performed by: PHYSICIAN ASSISTANT

## 2017-11-12 PROCEDURE — 85610 PROTHROMBIN TIME: CPT | Performed by: HOSPITALIST

## 2017-11-12 PROCEDURE — 74011250636 HC RX REV CODE- 250/636: Performed by: NURSE PRACTITIONER

## 2017-11-12 PROCEDURE — 94640 AIRWAY INHALATION TREATMENT: CPT

## 2017-11-12 PROCEDURE — 85730 THROMBOPLASTIN TIME PARTIAL: CPT | Performed by: HOSPITALIST

## 2017-11-12 PROCEDURE — 74011636637 HC RX REV CODE- 636/637: Performed by: HOSPITALIST

## 2017-11-12 RX ORDER — PANTOPRAZOLE SODIUM 40 MG/1
40 TABLET, DELAYED RELEASE ORAL
Status: DISCONTINUED | OUTPATIENT
Start: 2017-11-12 | End: 2017-11-13 | Stop reason: CLARIF

## 2017-11-12 RX ORDER — KETOROLAC TROMETHAMINE 30 MG/ML
15 INJECTION, SOLUTION INTRAMUSCULAR; INTRAVENOUS EVERY 6 HOURS
Status: DISPENSED | OUTPATIENT
Start: 2017-11-12 | End: 2017-11-14

## 2017-11-12 RX ORDER — OXYCODONE AND ACETAMINOPHEN 10; 325 MG/1; MG/1
1 TABLET ORAL
Status: DISCONTINUED | OUTPATIENT
Start: 2017-11-12 | End: 2017-11-14 | Stop reason: HOSPADM

## 2017-11-12 RX ADMIN — BUDESONIDE 500 MCG: 0.5 INHALANT RESPIRATORY (INHALATION) at 19:50

## 2017-11-12 RX ADMIN — IPRATROPIUM BROMIDE AND ALBUTEROL SULFATE 3 ML: .5; 3 SOLUTION RESPIRATORY (INHALATION) at 19:50

## 2017-11-12 RX ADMIN — HYDROCODONE BITARTRATE AND ACETAMINOPHEN 1 TABLET: 10; 325 TABLET ORAL at 08:55

## 2017-11-12 RX ADMIN — PREDNISONE 40 MG: 20 TABLET ORAL at 08:50

## 2017-11-12 RX ADMIN — KETOROLAC TROMETHAMINE 15 MG: 30 INJECTION, SOLUTION INTRAMUSCULAR at 23:33

## 2017-11-12 RX ADMIN — DOCUSATE SODIUM 100 MG: 100 CAPSULE, LIQUID FILLED ORAL at 17:33

## 2017-11-12 RX ADMIN — AZITHROMYCIN 250 MG: 250 TABLET, FILM COATED ORAL at 08:51

## 2017-11-12 RX ADMIN — GUAIFENESIN 600 MG: 600 TABLET, EXTENDED RELEASE ORAL at 08:51

## 2017-11-12 RX ADMIN — IPRATROPIUM BROMIDE AND ALBUTEROL SULFATE 3 ML: .5; 3 SOLUTION RESPIRATORY (INHALATION) at 12:52

## 2017-11-12 RX ADMIN — OXYCODONE HYDROCHLORIDE AND ACETAMINOPHEN 1 TABLET: 10; 325 TABLET ORAL at 17:34

## 2017-11-12 RX ADMIN — PANTOPRAZOLE SODIUM 40 MG: 40 TABLET, DELAYED RELEASE ORAL at 17:32

## 2017-11-12 RX ADMIN — KETOROLAC TROMETHAMINE 15 MG: 30 INJECTION, SOLUTION INTRAMUSCULAR at 17:30

## 2017-11-12 RX ADMIN — GUAIFENESIN 600 MG: 600 TABLET, EXTENDED RELEASE ORAL at 17:34

## 2017-11-12 RX ADMIN — KETOROLAC TROMETHAMINE 15 MG: 30 INJECTION, SOLUTION INTRAMUSCULAR at 11:46

## 2017-11-12 RX ADMIN — IPRATROPIUM BROMIDE AND ALBUTEROL SULFATE 3 ML: .5; 3 SOLUTION RESPIRATORY (INHALATION) at 17:43

## 2017-11-12 RX ADMIN — BUDESONIDE 500 MCG: 0.5 INHALANT RESPIRATORY (INHALATION) at 08:05

## 2017-11-12 RX ADMIN — APIXABAN 10 MG: 5 TABLET, FILM COATED ORAL at 17:30

## 2017-11-12 RX ADMIN — ENOXAPARIN SODIUM 150 MG: 150 INJECTION SUBCUTANEOUS at 01:30

## 2017-11-12 RX ADMIN — IPRATROPIUM BROMIDE AND ALBUTEROL SULFATE 3 ML: .5; 3 SOLUTION RESPIRATORY (INHALATION) at 08:05

## 2017-11-12 RX ADMIN — DOCUSATE SODIUM 100 MG: 100 CAPSULE, LIQUID FILLED ORAL at 08:51

## 2017-11-12 NOTE — ROUTINE PROCESS
Bedside, Verbal and Written shift change report given to TERESA Tabor RN (oncoming nurse) by Sina PAIGE(offgoing nurse). Report included the following information SBAR, Kardex, and MAR. Hourly rounding and  chart checks completed.

## 2017-11-12 NOTE — PROGRESS NOTES
[facility] Hospitalist Group  Progress Note    Patient: Justin Tinajero Age: 32 y.o. : 1990 MR#: [id number] SSN: [id number]  Date: 2017     Subjective:     Continues to c/o substernal chest pain worsening with cough, deep breathing. Does not like the way that morphine made him feel but states he would take it if needed. Did not feel the norco helped with pain control. Assessment/Plan:   1. Acute bilateral pulmonary embolism - cont lovenox, pulmonary eval (call place to Dr. Dona Vaughn). Check echo. 2. Acute left soleal DVT - per duplex, cont lovenox  3. Acute asthma exacerbation - cont prednisone, duoneb, pulmicort. 4.  Acute dyspnea multifactorial, asthma / bilateral PE  5. Substernal chest pain - d/c norco, trial of percocet per patient request, start toradol for short course. PPI as on prednisone also. Additional Notes:  Patient states he only drinks alcohol \"once in a blue moon\" no regular alcohol use.      Case discussed with:  [x]Patient  []Family  [x]Nursing  []Case Management  DVT Prophylaxis:  [x]Lovenox  []Hep SQ  []SCDs  []Coumadin   []On Heparin gtt    Objective:   VS:   Visit Vitals    /83 (BP 1 Location: Left arm, BP Patient Position: At rest)    Pulse 66    Temp 97.5 °F (36.4 °C)    Resp 18    Ht 6' 4\" (1.93 m)    Wt 147.9 kg (325 lb 15.6 oz)    SpO2 99%    BMI 39.68 kg/m2      Tmax/24hrs: Temp (24hrs), Av.8 °F (36.6 °C), Min:97.5 °F (36.4 °C), Max:98.3 °F (36.8 °C)    Intake/Output Summary (Last 24 hours) at 17 1109  Last data filed at 17 0641   Gross per 24 hour   Intake              240 ml   Output             1725 ml   Net            -1485 ml     General:  Alert, NAD  Cardiovascular:  RRR  Pulmonary:  + exp wheeze throughout lung field  GI:  +BS in all four quadrants, soft, non-tender  Extremities:  No edema; 2+ dorsalis pedis pulses bilaterally  Neuro: oriented x 4    Family contact: Colvin Gaucher [phone]    Labs:    Recent Results (from the past 24 hour(s))   EKG, 12 LEAD, INITIAL    Collection Time: 11/11/17  3:38 PM   Result Value Ref Range    Ventricular Rate 97 BPM    Atrial Rate 97 BPM    P-R Interval 154 ms    QRS Duration 94 ms    Q-T Interval 342 ms    QTC Calculation (Bezet) 434 ms    Calculated P Axis 42 degrees    Calculated R Axis 57 degrees    Calculated T Axis 13 degrees    Diagnosis       Normal sinus rhythm  Normal ECG  When compared with ECG of 11-NOV-2017 10:48,  No significant change was found  Confirmed by Sheldon Bloch ([id number]) on 11/11/2017 8:97:79 PM     METABOLIC PANEL, COMPREHENSIVE    Collection Time: 11/12/17  2:07 AM   Result Value Ref Range    Sodium 141 136 - 145 mmol/L    Potassium 3.7 3.5 - 5.5 mmol/L    Chloride 105 100 - 108 mmol/L    CO2 25 21 - 32 mmol/L    Anion gap 11 3.0 - 18 mmol/L    Glucose 104 (H) 74 - 99 mg/dL    BUN 11 7.0 - 18 MG/DL    Creatinine 1.01 0.6 - 1.3 MG/DL    BUN/Creatinine ratio 11 (L) 12 - 20      GFR est AA >60 >60 ml/min/1.73m2    GFR est non-AA >60 >60 ml/min/1.73m2    Calcium 8.8 8.5 - 10.1 MG/DL    Bilirubin, total 0.7 0.2 - 1.0 MG/DL    ALT (SGPT) 22 16 - 61 U/L    AST (SGOT) 8 (L) 15 - 37 U/L    Alk. phosphatase 48 45 - 117 U/L    Protein, total 6.9 6.4 - 8.2 g/dL    Albumin 3.2 (L) 3.4 - 5.0 g/dL    Globulin 3.7 2.0 - 4.0 g/dL    A-G Ratio 0.9 0.8 - 1.7     CBC WITH AUTOMATED DIFF    Collection Time: 11/12/17  2:07 AM   Result Value Ref Range    WBC 15.0 (H) 4.6 - 13.2 K/uL    RBC 5.44 4.70 - 5.50 M/uL    HGB 15.5 13.0 - 16.0 g/dL    HCT 44.6 36.0 - 48.0 %    MCV 82.0 74.0 - 97.0 FL    MCH 28.5 24.0 - 34.0 PG    MCHC 34.8 31.0 - 37.0 g/dL    RDW 14.7 (H) 11.6 - 14.5 %    PLATELET 290 435 - 347 K/uL    MPV 11.1 9.2 - 11.8 FL    NEUTROPHILS 73 40 - 73 %    LYMPHOCYTES 19 (L) 21 - 52 %    MONOCYTES 8 3 - 10 %    EOSINOPHILS 0 0 - 5 %    BASOPHILS 0 0 - 2 %    ABS. NEUTROPHILS 11.0 (H) 1.8 - 8.0 K/UL    ABS.  LYMPHOCYTES 2.8 0.9 - 3.6 K/UL ABS. MONOCYTES 1.2 0.05 - 1.2 K/UL    ABS. EOSINOPHILS 0.0 0.0 - 0.4 K/UL    ABS.  BASOPHILS 0.0 0.0 - 0.06 K/UL    DF AUTOMATED     PROTHROMBIN TIME + INR    Collection Time: 11/12/17  2:07 AM   Result Value Ref Range    Prothrombin time 13.0 11.5 - 15.2 sec    INR 1.0 0.8 - 1.2     PTT    Collection Time: 11/12/17  2:07 AM   Result Value Ref Range    aPTT 31.9 23.0 - 36.4 SEC     Signed By: Young Savage NP     November 12, 2017

## 2017-11-12 NOTE — CONSULTS
[facility]  Pulmonary, Critical Care, and Sleep Medicine    Name: Luis Manuel Mcclain MRN: [id number]   : 1990 Hospital: [facility]   Date: 2017        Pulmonary Initial In-Patient Consult                                              Consult requesting physician: Dr. Becka Norman   Reason for Consult: Pulmonary embolus     IMPRESSION:   · Acute bilateral subsegmental pulmonary embolism, currently on Lovenox, on room air with negative troponin and normotensive. Possibly provoked from 8 hour road trip in the setting of acute DVT. Should r/o hypercoaguable disorder as there is questionable family history of thrombosis. · Acute left sided soleal DVT, recent long road trip (8hr)  · Hx asthma with recent exacerbation currently on steroids   · Tobacco abuse       RECOMMENDATIONS:   · Will obtain NT-BNP and will f/u echocardiogram for completeness  · Will stop Lovenox and start Eliquis - 10mg BID x7 days then 5mg BID for 3-6  months. · Will need f/u duplex in 3 months. · Hypercoagulable work-up done as outpatient as he reports mother has had clots previously. · Counseled on smoking cessation. · Management of asthma and other medical issues as per primary team   · Can d/c bedrest order if no other reason to be on bedrest.  Will leave to primary team.   · Discussed with Dr. Irais Ramos       Subjective/History:     Luis Manuel Mcclain is a 32 y.o. male with PMHx significant for asthma who works as a , recently taking an 8 hour trip, presented to the ED with 3-4 days of calf pain and tightness along with a 1 day history of chest pain and mild shortness of breath. He had a venous duplex which found an acute soleal DVT in his left leg. He also had CTA which showed bilateral subsegmental lower lobe pulmonary emboli. His was normotensive, troponin negative, and did not have any significant EKG changes.   He also noted he was admitted recently for asthma exacerbation and has been on prednisone. He was admitted to the hospital and started on Lovenox. He reports smoking about 8 cigarettes per day which is down from 1 ppd, for a total of about 3 years. He believes his mother had a blood clot many years ago. He denies taking exogenous hormones. Pulmonary was consulted for further assistance. Review of Systems:  Pertinent items are noted in HPI. Review of Systems: (positive items in bold)  HEENT: No epistaxis, no nasal drainage, no difficulty in swallowing, no redness in eyes  Respiratory: Shortness of breath   Cardiovascular: chest pain, no palpitations, leg edema, no syncope  Gastrointestinal: no abd pain,  Neurological: No focal weakness  Behvioral/Psych:,tobacco abuse  Constitutional: No fever, no chills, no weight loss, no night sweats       Immunization status: There is no immunization history for the selected administration types on file for this patient. No Known Allergies       Past Medical History:   Diagnosis Date    Asthma     Thromboembolus [facility])         History reviewed. No pertinent surgical history. History reviewed. No pertinent family history. Social History   Substance Use Topics    Smoking status: Former Smoker    Smokeless tobacco: Never Used    Alcohol use Yes        Prior to Admission medications    Medication Sig Start Date End Date Taking? Authorizing Provider   azithromycin (ZITHROMAX) 250 mg tablet Take 1 Tab by mouth daily for 5 days. 11/6/17 11/11/17 Yes Devi Portillo PA-C   guaiFENesin ER (MUCINEX) 600 mg ER tablet Take 1 Tab by mouth two (2) times a day. 11/6/17  Yes Devi Portillo PA-C   predniSONE (DELTASONE) 20 mg tablet Take 2 Tabs by mouth daily (with breakfast). For 3 days. 11/6/17  Yes Deiv Portillo PA-C   ipratropium-albuterol (COMBIVENT RESPIMAT)  mcg/actuation inhaler Take 1 Puff by inhalation every six (6) hours.  11/6/17  Yes Devi Portillo PA-C       Current Facility-Administered Medications   Medication Dose Route Frequency    oxyCODONE-acetaminophen (PERCOCET 10)  mg per tablet 1 Tab  1 Tab Oral Q4H PRN    ketorolac (TORADOL) injection 15 mg  15 mg IntraVENous Q6H    pantoprazole (PROTONIX) tablet 40 mg  40 mg Oral ACB&D    azithromycin (ZITHROMAX) tablet 250 mg  250 mg Oral DAILY    guaiFENesin ER (MUCINEX) tablet 600 mg  600 mg Oral BID    predniSONE (DELTASONE) tablet 40 mg  40 mg Oral DAILY WITH BREAKFAST    albuterol-ipratropium (DUO-NEB) 2.5 MG-0.5 MG/3 ML  3 mL Nebulization QID RT    budesonide (PULMICORT) 500 mcg/2 ml nebulizer suspension  500 mcg Nebulization BID RT    albuterol CONCENTRATE 2.5mg/0.5 mL neb soln  2.5 mg Nebulization Q4H PRN    enoxaparin (LOVENOX) injection 150 mg  150 mg SubCUTAneous Q12H    morphine injection 2 mg  2 mg IntraVENous Q4H PRN    naloxone (NARCAN) injection 0.4 mg  0.4 mg IntraVENous PRN    diphenhydrAMINE (BENADRYL) injection 12.5 mg  12.5 mg IntraVENous Q4H PRN    ondansetron (ZOFRAN) injection 4 mg  4 mg IntraVENous Q4H PRN    docusate sodium (COLACE) capsule 100 mg  100 mg Oral BID    zolpidem (AMBIEN) tablet 5 mg  5 mg Oral QHS PRN         Telemetry:normal sinus rhythm    Objective:   Vital Signs:    Visit Vitals    /74 (BP 1 Location: Left arm, BP Patient Position: At rest)    Pulse 81    Temp 98 °F (36.7 °C)    Resp 18    Ht 6' 4\" (1.93 m)    Wt 147.9 kg (325 lb 15.6 oz)    SpO2 96%    BMI 39.68 kg/m2       O2 Device: Room air       Temp (24hrs), Av.8 °F (36.6 °C), Min:97.5 °F (36.4 °C), Max:98.3 °F (36.8 °C)       Intake/Output:   Last shift:      701 - 1900  In: -   Out: 325 [Urine:325]  Last 3 shifts: 11/10 1901 -  07  In: 240 [P.O.:240]  Out: 1725 [Urine:1725]    Intake/Output Summary (Last 24 hours) at 17 1221  Last data filed at 17 1125   Gross per 24 hour   Intake              240 ml   Output             2050 ml   Net            -1810 ml       Physical Exam:    General: Young male, sitting in bed, no apparent distress   HEENT: Normocephalic and atraumatic. Conjunctivae clear. No scleral icterus. Neck: Supple. Trachea midline. No JVD. Lungs: Symmetrical chest rise and fall. No accessory muscle usage. Occasional wheeze in left lung, otherwise lungs clear. Heart: Regular rate and regular rhythm. Abdomen: Soft, non-tender. Extremity: Distal pulses 2+ in all extremities. Extremities without cyanosis or edema. Some TTP to left calf. Neuro: Speech clear. No facial droop. Equal  strength bilaterally. Moves all extremities. Skin: Warm, dry. Data:       Recent Results (from the past 24 hour(s))   EKG, 12 LEAD, INITIAL    Collection Time: 11/11/17  3:38 PM   Result Value Ref Range    Ventricular Rate 97 BPM    Atrial Rate 97 BPM    P-R Interval 154 ms    QRS Duration 94 ms    Q-T Interval 342 ms    QTC Calculation (Bezet) 434 ms    Calculated P Axis 42 degrees    Calculated R Axis 57 degrees    Calculated T Axis 13 degrees    Diagnosis       Normal sinus rhythm  Normal ECG  When compared with ECG of 11-NOV-2017 10:48,  No significant change was found  Confirmed by Nolia Sandhoff [id number]) on 11/11/2017 9:82:61 PM     METABOLIC PANEL, COMPREHENSIVE    Collection Time: 11/12/17  2:07 AM   Result Value Ref Range    Sodium 141 136 - 145 mmol/L    Potassium 3.7 3.5 - 5.5 mmol/L    Chloride 105 100 - 108 mmol/L    CO2 25 21 - 32 mmol/L    Anion gap 11 3.0 - 18 mmol/L    Glucose 104 (H) 74 - 99 mg/dL    BUN 11 7.0 - 18 MG/DL    Creatinine 1.01 0.6 - 1.3 MG/DL    BUN/Creatinine ratio 11 (L) 12 - 20      GFR est AA >60 >60 ml/min/1.73m2    GFR est non-AA >60 >60 ml/min/1.73m2    Calcium 8.8 8.5 - 10.1 MG/DL    Bilirubin, total 0.7 0.2 - 1.0 MG/DL    ALT (SGPT) 22 16 - 61 U/L    AST (SGOT) 8 (L) 15 - 37 U/L    Alk.  phosphatase 48 45 - 117 U/L    Protein, total 6.9 6.4 - 8.2 g/dL    Albumin 3.2 (L) 3.4 - 5.0 g/dL    Globulin 3.7 2.0 - 4.0 g/dL    A-G Ratio 0.9 0.8 - 1.7     CBC WITH AUTOMATED DIFF    Collection Time: 11/12/17  2:07 AM   Result Value Ref Range    WBC 15.0 (H) 4.6 - 13.2 K/uL    RBC 5.44 4.70 - 5.50 M/uL    HGB 15.5 13.0 - 16.0 g/dL    HCT 44.6 36.0 - 48.0 %    MCV 82.0 74.0 - 97.0 FL    MCH 28.5 24.0 - 34.0 PG    MCHC 34.8 31.0 - 37.0 g/dL    RDW 14.7 (H) 11.6 - 14.5 %    PLATELET 173 447 - 897 K/uL    MPV 11.1 9.2 - 11.8 FL    NEUTROPHILS 73 40 - 73 %    LYMPHOCYTES 19 (L) 21 - 52 %    MONOCYTES 8 3 - 10 %    EOSINOPHILS 0 0 - 5 %    BASOPHILS 0 0 - 2 %    ABS. NEUTROPHILS 11.0 (H) 1.8 - 8.0 K/UL    ABS. LYMPHOCYTES 2.8 0.9 - 3.6 K/UL    ABS. MONOCYTES 1.2 0.05 - 1.2 K/UL    ABS. EOSINOPHILS 0.0 0.0 - 0.4 K/UL    ABS. BASOPHILS 0.0 0.0 - 0.06 K/UL    DF AUTOMATED     PROTHROMBIN TIME + INR    Collection Time: 11/12/17  2:07 AM   Result Value Ref Range    Prothrombin time 13.0 11.5 - 15.2 sec    INR 1.0 0.8 - 1.2     PTT    Collection Time: 11/12/17  2:07 AM   Result Value Ref Range    aPTT 31.9 23.0 - 36.4 SEC         Chemistry Recent Labs      11/12/17   0207  11/11/17   1105  11/10/17   2159   GLU  104*  84  106*   NA  141  142  141   K  3.7  4.2  4.1   CL  105  105  108   CO2  25  31  27   BUN  11  17  18   CREA  1.01  1.11  1.26   CA  8.8  8.8  8.2*   AGAP  11  6  6   BUCR  11*  15  14   AP  48   --   47   TP  6.9   --   6.4   ALB  3.2*   --   3.0*   GLOB  3.7   --   3.4   AGRAT  0.9   --   0.9        Lactic Acid No results found for: LAC  No results for input(s): LAC in the last 72 hours. Liver Enzymes Protein, total   Date Value Ref Range Status   11/12/2017 6.9 6.4 - 8.2 g/dL Final     Albumin   Date Value Ref Range Status   11/12/2017 3.2 (L) 3.4 - 5.0 g/dL Final     Globulin   Date Value Ref Range Status   11/12/2017 3.7 2.0 - 4.0 g/dL Final     A-G Ratio   Date Value Ref Range Status   11/12/2017 0.9 0.8 - 1.7   Final     AST (SGOT)   Date Value Ref Range Status   11/12/2017 8 (L) 15 - 37 U/L Final     Alk.  phosphatase   Date Value Ref Range Status 11/12/2017 48 45 - 117 U/L Final     Recent Labs      11/12/17   0207  11/10/17   2159   TP  6.9  6.4   ALB  3.2*  3.0*   GLOB  3.7  3.4   AGRAT  0.9  0.9   SGOT  8*  11*   AP  48  47        CBC w/Diff Recent Labs      11/12/17   0207  11/11/17   1105  11/10/17   2159   WBC  15.0*  8.3  10.8   RBC  5.44  5.50  5.27   HGB  15.5  15.9  15.3   HCT  44.6  45.2  43.3   PLT  200  213  189   GRANS  73  49  44   LYMPH  19*  36  44   EOS  0  5  4        Cardiac Enzymes No results found for: CPK, CK, CKMMB, CKMB, RCK3, CKMBT, CKNDX, CKND1, ROYAL, TROPT, TROIQ, MARLEN, TROPT, TNIPOC, BNP, BNPP     BNP No results found for: BNP, BNPP, XBNPT     Coagulation Recent Labs      11/12/17 0207   PTP  13.0   INR  1.0   APTT  31.9         Thyroid  No results found for: T4, T3U, TSH, TSHEXT    No results found for: T4     Lipid Panel No results found for: CHOL, CHOLPOCT, CHOLX, CHLST, CHOLV, 995517, HDL, LDL, LDLC, DLDLP, 603590, VLDLC, VLDL, TGLX, TRIGL, TRIGP, TGLPOCT, CHHD, CHHDX     ABG No results for input(s): PHI, PHI, POC2, PCO2I, PO2, PO2I, HCO3, HCO3I, FIO2, FIO2I in the last 72 hours. Urinalysis Lab Results   Component Value Date/Time    Color YELLOW 05/09/2012 09:22 PM    Appearance CLEAR 05/09/2012 09:22 PM    Specific gravity >1.030 05/09/2012 09:22 PM    pH (UA) 6.0 05/09/2012 09:22 PM    Protein NEGATIVE  05/09/2012 09:22 PM    Glucose NEGATIVE  05/09/2012 09:22 PM    Ketone NEGATIVE  05/09/2012 09:22 PM    Bilirubin NEGATIVE  05/09/2012 09:22 PM    Urobilinogen 0.2 05/09/2012 09:22 PM    Nitrites NEGATIVE  05/09/2012 09:22 PM    Leukocyte Esterase NEGATIVE  05/09/2012 09:22 PM        Micro  No results for input(s): SDES, CULT in the last 72 hours. No results for input(s): CULT in the last 72 hours. EKG No results found for this or any previous visit. PFT No flowsheet data found.      Other ASA reactivity:   Pre-albumin:   Ionized Calcium:   NH4:   T3, FT4:  Cortisol:  Urine Osm:  Urine Lytes:   HbA1c: Ultrasound      LE Doppler      ECHO No results found for this or any previous visit. CT (Most Recent)   Results from Hospital Encounter encounter on 11/11/17   CTA CHEST W OR W WO CONT   Narrative CTA chest- pulmonary embolus protocol     CPT code: 54761     Indications: Chest and calf pain. Clinical concern for pulmonary embolus with  DVT. Technique: Utilizing pulmonary embolus protocol, thin section axial images were  obtained from the thoracic inlet into the upper abdomen after the uneventful  administration of IV contrast. Coronal and sagittal maximum intensity projection  (MIP) post-processed images were generated to better define pulmonary artery  anatomy and enhance sensitivity for detection of pulmonary emboli. Contrast used: Initially 50 cc Isovue 320. After a nondiagnostic study, the scan  was repeated following the injection of 100 cc of Isovue 320. Total dose of  iodinated contrast 150 cc, Isovue 320. All CT scans at this facility are performed using dose optimization technique as  appropriate to a performed exam, to include automated exposure control,  adjustment of the mA and/or kV according to patient size (including appropriate  matching for site-specific examinations), or use of iterative reconstruction  technique. Comparison: None. Findings: On initial scan the attenuation of the pulmonary artery measures 123 Hounsfield  units, below diagnostic criteria for pulmonary embolism. Pulmonary embolism  therefore cannot be excluded. Discussed the case with the ER physician. The  patient has a diagnosis of lower extremity DVT, and therefore considering the  risk factors for pulmonary embolism, it was elected to repeat the study using  100 cc of IV contrast.    On repeat exam central attenuation of the pulmonary artery is approximately 275  Hounsfield units, diagnostic.  There is embolus within subsegmental branches of  the medial left lower lobe (as on axial image 159) and the right lower lobe (as  on axial 118). Thoracic aorta is nonaneurysmal. No dissection is seen. Visualized thyroid unremarkable. No adenopathy is seen. No pericardial effusion. No pleural effusion. The lungs appear clear. Mild dependent changes. A small portion of the upper abdomen is included on this study. The visualized  portions of the upper abdomen appear normal.    Skeleton/soft tissues: Within normal limits. Impression IMPRESSION[de-identified]    Exam is positive for subsegmental lower lobe pulmonary emboli bilaterally. Discussed with Dr. Karina Silva, November 11, 2017 at 210 PM. Confirmed. Thank you for this referral.             XR (Most Recent). CXR reviewed by me and compared with previous CXR   Results from Hospital Encounter encounter on 11/03/17   XR CHEST PORT   Narrative EXAM: Chest Radiograph    INDICATION: Shortness of breath    TECHNIQUE: AP view of the chest    COMPARISON: 2/1/2013 and 5/9/2012    FINDINGS: No pneumothorax identified. The lungs are clear. No infiltrates  appreciated. No effusions identified. The cardiomediastinal silhouette is  unremarkable. The pulmonary vasculature is unremarkable. The osseous  structures are unremarkable. Impression Impression:  1. No acute cardiopulmonary process. Imaging:   [x] I have personally reviewed the patients radiographs  CTA Chest  Findings:     On initial scan the attenuation of the pulmonary artery measures 123 Hounsfield  units, below diagnostic criteria for pulmonary embolism. Pulmonary embolism  therefore cannot be excluded. Discussed the case with the ER physician. The  patient has a diagnosis of lower extremity DVT, and therefore considering the  risk factors for pulmonary embolism, it was elected to repeat the study using  100 cc of IV contrast.     On repeat exam central attenuation of the pulmonary artery is approximately 275  Hounsfield units, diagnostic.  There is embolus within subsegmental branches of  the medial left lower lobe (as on axial image 159) and the right lower lobe (as  on axial 118). Thoracic aorta is nonaneurysmal. No dissection is seen. Visualized thyroid unremarkable. No adenopathy is seen. No pericardial effusion. No pleural effusion. The lungs appear clear. Mild dependent changes.     A small portion of the upper abdomen is included on this study. The visualized  portions of the upper abdomen appear normal.     Skeleton/soft tissues: Within normal limits.     IMPRESSION  IMPRESSION[de-identified]     Exam is positive for subsegmental lower lobe pulmonary emboli bilaterally.       [x]See my orders for details      Luis Enrique Larios PA-C  11/12/2017

## 2017-11-13 LAB
ANION GAP SERPL CALC-SCNC: 6 MMOL/L (ref 3–18)
BASOPHILS # BLD: 0 K/UL (ref 0–0.06)
BASOPHILS NFR BLD: 0 % (ref 0–2)
BUN SERPL-MCNC: 16 MG/DL (ref 7–18)
BUN/CREAT SERPL: 15 (ref 12–20)
CALCIUM SERPL-MCNC: 8.6 MG/DL (ref 8.5–10.1)
CHLORIDE SERPL-SCNC: 109 MMOL/L (ref 100–108)
CK MB CFR SERPL CALC: NORMAL % (ref 0–4)
CK MB SERPL-MCNC: <1 NG/ML (ref 5–25)
CK SERPL-CCNC: 47 U/L (ref 39–308)
CO2 SERPL-SCNC: 28 MMOL/L (ref 21–32)
CREAT SERPL-MCNC: 1.09 MG/DL (ref 0.6–1.3)
DIFFERENTIAL METHOD BLD: ABNORMAL
EOSINOPHIL # BLD: 0.1 K/UL (ref 0–0.4)
EOSINOPHIL NFR BLD: 1 % (ref 0–5)
ERYTHROCYTE [DISTWIDTH] IN BLOOD BY AUTOMATED COUNT: 14.9 % (ref 11.6–14.5)
GLUCOSE SERPL-MCNC: 93 MG/DL (ref 74–99)
HCT VFR BLD AUTO: 43.2 % (ref 36–48)
HGB BLD-MCNC: 15 G/DL (ref 13–16)
LYMPHOCYTES # BLD: 4.1 K/UL (ref 0.9–3.6)
LYMPHOCYTES NFR BLD: 30 % (ref 21–52)
MCH RBC QN AUTO: 28.6 PG (ref 24–34)
MCHC RBC AUTO-ENTMCNC: 34.7 G/DL (ref 31–37)
MCV RBC AUTO: 82.4 FL (ref 74–97)
MONOCYTES # BLD: 1.4 K/UL (ref 0.05–1.2)
MONOCYTES NFR BLD: 10 % (ref 3–10)
NEUTS SEG # BLD: 7.8 K/UL (ref 1.8–8)
NEUTS SEG NFR BLD: 59 % (ref 40–73)
PLATELET # BLD AUTO: 214 K/UL (ref 135–420)
PMV BLD AUTO: 10.8 FL (ref 9.2–11.8)
POTASSIUM SERPL-SCNC: 4.2 MMOL/L (ref 3.5–5.5)
RBC # BLD AUTO: 5.24 M/UL (ref 4.7–5.5)
SODIUM SERPL-SCNC: 143 MMOL/L (ref 136–145)
TROPONIN I SERPL-MCNC: <0.02 NG/ML (ref 0–0.04)
WBC # BLD AUTO: 13.4 K/UL (ref 4.6–13.2)

## 2017-11-13 PROCEDURE — 74011250636 HC RX REV CODE- 250/636: Performed by: NURSE PRACTITIONER

## 2017-11-13 PROCEDURE — 65660000000 HC RM CCU STEPDOWN

## 2017-11-13 PROCEDURE — 85025 COMPLETE CBC W/AUTO DIFF WBC: CPT | Performed by: NURSE PRACTITIONER

## 2017-11-13 PROCEDURE — 94660 CPAP INITIATION&MGMT: CPT

## 2017-11-13 PROCEDURE — 94640 AIRWAY INHALATION TREATMENT: CPT

## 2017-11-13 PROCEDURE — 36415 COLL VENOUS BLD VENIPUNCTURE: CPT | Performed by: NURSE PRACTITIONER

## 2017-11-13 PROCEDURE — 74011250637 HC RX REV CODE- 250/637: Performed by: PHYSICIAN ASSISTANT

## 2017-11-13 PROCEDURE — 82550 ASSAY OF CK (CPK): CPT | Performed by: NURSE PRACTITIONER

## 2017-11-13 PROCEDURE — 93306 TTE W/DOPPLER COMPLETE: CPT

## 2017-11-13 PROCEDURE — 74011250637 HC RX REV CODE- 250/637: Performed by: NURSE PRACTITIONER

## 2017-11-13 PROCEDURE — 74011000250 HC RX REV CODE- 250: Performed by: HOSPITALIST

## 2017-11-13 PROCEDURE — 80048 BASIC METABOLIC PNL TOTAL CA: CPT | Performed by: NURSE PRACTITIONER

## 2017-11-13 PROCEDURE — 74011250637 HC RX REV CODE- 250/637: Performed by: HOSPITALIST

## 2017-11-13 PROCEDURE — 74011636637 HC RX REV CODE- 636/637: Performed by: HOSPITALIST

## 2017-11-13 RX ORDER — FAMOTIDINE 20 MG/1
20 TABLET, FILM COATED ORAL EVERY 12 HOURS
Status: DISCONTINUED | OUTPATIENT
Start: 2017-11-13 | End: 2017-11-14 | Stop reason: HOSPADM

## 2017-11-13 RX ADMIN — GUAIFENESIN 600 MG: 600 TABLET, EXTENDED RELEASE ORAL at 08:52

## 2017-11-13 RX ADMIN — AZITHROMYCIN 250 MG: 250 TABLET, FILM COATED ORAL at 08:52

## 2017-11-13 RX ADMIN — IPRATROPIUM BROMIDE AND ALBUTEROL SULFATE 3 ML: .5; 3 SOLUTION RESPIRATORY (INHALATION) at 11:12

## 2017-11-13 RX ADMIN — APIXABAN 10 MG: 5 TABLET, FILM COATED ORAL at 08:52

## 2017-11-13 RX ADMIN — OXYCODONE HYDROCHLORIDE AND ACETAMINOPHEN 1 TABLET: 10; 325 TABLET ORAL at 11:02

## 2017-11-13 RX ADMIN — PREDNISONE 40 MG: 20 TABLET ORAL at 08:52

## 2017-11-13 RX ADMIN — BUDESONIDE 500 MCG: 0.5 INHALANT RESPIRATORY (INHALATION) at 21:13

## 2017-11-13 RX ADMIN — OXYCODONE HYDROCHLORIDE AND ACETAMINOPHEN 1 TABLET: 10; 325 TABLET ORAL at 22:00

## 2017-11-13 RX ADMIN — DOCUSATE SODIUM 100 MG: 100 CAPSULE, LIQUID FILLED ORAL at 08:52

## 2017-11-13 RX ADMIN — DOCUSATE SODIUM 100 MG: 100 CAPSULE, LIQUID FILLED ORAL at 18:09

## 2017-11-13 RX ADMIN — ZOLPIDEM TARTRATE 5 MG: 5 TABLET ORAL at 22:00

## 2017-11-13 RX ADMIN — PANTOPRAZOLE SODIUM 40 MG: 40 TABLET, DELAYED RELEASE ORAL at 07:11

## 2017-11-13 RX ADMIN — BUDESONIDE 500 MCG: 0.5 INHALANT RESPIRATORY (INHALATION) at 07:29

## 2017-11-13 RX ADMIN — IPRATROPIUM BROMIDE AND ALBUTEROL SULFATE 3 ML: .5; 3 SOLUTION RESPIRATORY (INHALATION) at 07:29

## 2017-11-13 RX ADMIN — FAMOTIDINE 20 MG: 20 TABLET, FILM COATED ORAL at 22:00

## 2017-11-13 RX ADMIN — APIXABAN 10 MG: 5 TABLET, FILM COATED ORAL at 18:09

## 2017-11-13 RX ADMIN — GUAIFENESIN 600 MG: 600 TABLET, EXTENDED RELEASE ORAL at 18:10

## 2017-11-13 RX ADMIN — IPRATROPIUM BROMIDE AND ALBUTEROL SULFATE 3 ML: .5; 3 SOLUTION RESPIRATORY (INHALATION) at 16:09

## 2017-11-13 RX ADMIN — OXYCODONE HYDROCHLORIDE AND ACETAMINOPHEN 1 TABLET: 10; 325 TABLET ORAL at 18:16

## 2017-11-13 RX ADMIN — IPRATROPIUM BROMIDE AND ALBUTEROL SULFATE 3 ML: .5; 3 SOLUTION RESPIRATORY (INHALATION) at 21:12

## 2017-11-13 RX ADMIN — KETOROLAC TROMETHAMINE 15 MG: 30 INJECTION, SOLUTION INTRAMUSCULAR at 07:13

## 2017-11-13 RX ADMIN — ALBUTEROL SULFATE 2.5 MG: 2.5 SOLUTION RESPIRATORY (INHALATION) at 11:02

## 2017-11-13 NOTE — PROGRESS NOTES
Problem: Deep Venous Thrombosis - Risk of  Goal: *Knowledge of prescribed medications  Outcome: Progressing Towards Goal  Pt educated on new medication prescribed (Eliquis); indications, dosing, and possible side effects explained. Handout provided. Problem: Patient Education: Go to Patient Education Activity  Goal: Patient/Family Education  Outcome: Progressing Towards Goal  Safety; pt currently on complete bedrest and has been compliant. Problem: Falls - Risk of  Goal: *Absence of Falls  Document Chavez Fall Risk and appropriate interventions in the flowsheet.    Fall Risk Interventions:            Medication Interventions: Patient to call before getting OOB    Elimination Interventions: Call light in reach, Urinal in reach, Toileting schedule/hourly rounds, Toilet paper/wipes in reach, Patient to call for help with toileting needs

## 2017-11-13 NOTE — PROGRESS NOTES
Problem: Pain  Goal: *Control of Pain  Outcome: Progressing Towards Goal  Pain assessment and management continued, pain reassessment completed after each intervention and hourly rounds. Pt educated on medication management, possible side affects and pain relief alternative interventions.

## 2017-11-13 NOTE — ROUTINE PROCESS
Bedside, Verbal and Written shift change report given to TERESA Koo RN (oncoming nurse) by Sina PAIGE(offgoing nurse). Report included the following information SBAR, Kardex, and MAR. Hourly rounding and  chart checks completed.

## 2017-11-13 NOTE — PROGRESS NOTES
Care Management Interventions  PCP Verified by CM: Yes  Mode of Transport at Discharge:  (family)  Transition of Care Consult (CM Consult): Discharge Planning  Physical Therapy Consult: No  Occupational Therapy Consult: No  Current Support Network: Relative's Home  Confirm Follow Up Transport: Family  Plan discussed with Pt/Family/Caregiver: Yes  Discharge Location  Discharge Placement: Home with family assistance    Pt is a 32year old admitted for pulmonary embolism. Pt is alert and oriented and alone in room. Pt reports that he lives with his brother and that his mother Lloyd Quintero [phone] and wife Clinton Frank are his emergency contacts. Pt reports that prior to admission he was independent in his ADLs and that he has CPAP at home. Pt reports that he has transportation home with family. and that he plans to return home with his brother. Pt given Eliquis card for free 30 day supply. Pt also provided with Eliquis 360 information. Pt refusing discharge today. States that he is afraid of falling because his leg hurts and he doesn't want to fall while on a blood thinner. MD aware. [id number] - Pt provided with Benefit Investigation Form for ELIQUIS. Will need MD signature on form.

## 2017-11-13 NOTE — PROGRESS NOTES
[facility] Hospitalist Group  Progress Note    Patient: Naomi Fowler Age: 32 y.o. : 1990 MR#: [id number] SSN: [id number]  Date: 2017     Subjective:     States +substernal chest pain and left leg pain ongoing but no percocet since last night. States when he took the percocet it helped with his pain. Assessment/Plan:   1. Acute bilateral pulmonary embolism / Acute left soleal DVT - discussed risk / benefits of eliquis patient accepting and agrees to eliquis use. Per pulm cont Eliquis 10 mg PO BID for 7 days, then 5 mg PO BID for 3-6 months. Repeat Doppler in 3 months. Hypercoag w/u as outpatient after stopping Rx. 2. Acute asthma exacerbation / bronchitis- cont prednisone, duoneb, pulmicort. Cont azithromycin for now. 4.  Acute dyspnea multifactorial, asthma / bilateral PE; check ambulatory oxygen sats for home needs. 5.  Substernal chest pain - likely impact of PE / low suspicion of cardiac etiology will recheck troponins despite. Encouraged patient to ask for PRN pain medications, states + effect from percocet; cont toradol. D/c bedrest, encourage ambulation. 6.  RUFINA / obesity - continue CPAP use  7. Tobacco abuse - encourage cessation    Additional Notes:  Denies regular alcohol use.       Case discussed with:  [x]Patient  []Family  [x]Nursing  []Case Management  DVT Prophylaxis:  []Lovenox  []Hep SQ  []SCDs  []Coumadin   [x]Eliquis    Objective:   VS:   Visit Vitals    /74 (BP 1 Location: Left arm, BP Patient Position: At rest)    Pulse (!) 104    Temp 97.3 °F (36.3 °C)    Resp 18    Ht 6' 4\" (1.93 m)    Wt 147.9 kg (325 lb 15.6 oz)    SpO2 95%    BMI 39.68 kg/m2      Tmax/24hrs: Temp (24hrs), Av.2 °F (36.8 °C), Min:97.3 °F (36.3 °C), Max:99.2 °F (37.3 °C)      Intake/Output Summary (Last 24 hours) at 17 1247  Last data filed at 17 0824   Gross per 24 hour   Intake             1313 ml   Output             1000 ml   Net 313 ml     General:  Alert, NAD  Cardiovascular:  RRR  Pulmonary:  + LSCTA  GI:  +BS in all four quadrants, soft, non-tender  Extremities:  No edema; 2+ dorsalis pedis pulses bilaterally  Neuro: oriented x 4    Family contact: Dang Rivera [phone]    Labs:    Recent Results (from the past 24 hour(s))   METABOLIC PANEL, BASIC    Collection Time: 11/13/17  3:50 AM   Result Value Ref Range    Sodium 143 136 - 145 mmol/L    Potassium 4.2 3.5 - 5.5 mmol/L    Chloride 109 (H) 100 - 108 mmol/L    CO2 28 21 - 32 mmol/L    Anion gap 6 3.0 - 18 mmol/L    Glucose 93 74 - 99 mg/dL    BUN 16 7.0 - 18 MG/DL    Creatinine 1.09 0.6 - 1.3 MG/DL    BUN/Creatinine ratio 15 12 - 20      GFR est AA >60 >60 ml/min/1.73m2    GFR est non-AA >60 >60 ml/min/1.73m2    Calcium 8.6 8.5 - 10.1 MG/DL   CBC WITH AUTOMATED DIFF    Collection Time: 11/13/17  3:50 AM   Result Value Ref Range    WBC 13.4 (H) 4.6 - 13.2 K/uL    RBC 5.24 4.70 - 5.50 M/uL    HGB 15.0 13.0 - 16.0 g/dL    HCT 43.2 36.0 - 48.0 %    MCV 82.4 74.0 - 97.0 FL    MCH 28.6 24.0 - 34.0 PG    MCHC 34.7 31.0 - 37.0 g/dL    RDW 14.9 (H) 11.6 - 14.5 %    PLATELET 076 715 - 502 K/uL    MPV 10.8 9.2 - 11.8 FL    NEUTROPHILS 59 40 - 73 %    LYMPHOCYTES 30 21 - 52 %    MONOCYTES 10 3 - 10 %    EOSINOPHILS 1 0 - 5 %    BASOPHILS 0 0 - 2 %    ABS. NEUTROPHILS 7.8 1.8 - 8.0 K/UL    ABS. LYMPHOCYTES 4.1 (H) 0.9 - 3.6 K/UL    ABS. MONOCYTES 1.4 (H) 0.05 - 1.2 K/UL    ABS. EOSINOPHILS 0.1 0.0 - 0.4 K/UL    ABS.  BASOPHILS 0.0 0.0 - 0.06 K/UL    DF AUTOMATED       Signed By: Douglas Yost NP     November 13, 2017

## 2017-11-13 NOTE — ROUTINE PROCESS
Ambulating in halls. Pulse ox on while ambulating. 1440 Pulse ox while ambulating was 94-97%. -135. C/o some SOB. Once back in room sitting down pulse ox 95% Pulse 83.

## 2017-11-14 VITALS
SYSTOLIC BLOOD PRESSURE: 135 MMHG | RESPIRATION RATE: 18 BRPM | TEMPERATURE: 99.1 F | WEIGHT: 315 LBS | OXYGEN SATURATION: 92 % | BODY MASS INDEX: 38.36 KG/M2 | HEART RATE: 97 BPM | DIASTOLIC BLOOD PRESSURE: 83 MMHG | HEIGHT: 76 IN

## 2017-11-14 PROCEDURE — 74011000250 HC RX REV CODE- 250: Performed by: HOSPITALIST

## 2017-11-14 PROCEDURE — 74011250636 HC RX REV CODE- 250/636: Performed by: HOSPITALIST

## 2017-11-14 PROCEDURE — 74011250637 HC RX REV CODE- 250/637: Performed by: PHYSICIAN ASSISTANT

## 2017-11-14 PROCEDURE — 90686 IIV4 VACC NO PRSV 0.5 ML IM: CPT | Performed by: HOSPITALIST

## 2017-11-14 PROCEDURE — 90471 IMMUNIZATION ADMIN: CPT

## 2017-11-14 PROCEDURE — 74011250637 HC RX REV CODE- 250/637: Performed by: NURSE PRACTITIONER

## 2017-11-14 PROCEDURE — 74011250637 HC RX REV CODE- 250/637: Performed by: HOSPITALIST

## 2017-11-14 PROCEDURE — 94640 AIRWAY INHALATION TREATMENT: CPT

## 2017-11-14 PROCEDURE — 74011636637 HC RX REV CODE- 636/637: Performed by: HOSPITALIST

## 2017-11-14 RX ORDER — BUDESONIDE 0.5 MG/2ML
500 INHALANT ORAL 2 TIMES DAILY
Qty: 100 ML | Refills: 0 | Status: SHIPPED | OUTPATIENT
Start: 2017-11-14 | End: 2018-04-23

## 2017-11-14 RX ORDER — IPRATROPIUM BROMIDE AND ALBUTEROL SULFATE 2.5; .5 MG/3ML; MG/3ML
3 SOLUTION RESPIRATORY (INHALATION)
Qty: 100 ML | Refills: 0 | Status: SHIPPED | OUTPATIENT
Start: 2017-11-14 | End: 2018-04-23

## 2017-11-14 RX ORDER — FAMOTIDINE 20 MG/1
20 TABLET, FILM COATED ORAL EVERY 12 HOURS
Qty: 15 TAB | Refills: 0 | Status: SHIPPED | OUTPATIENT
Start: 2017-11-14 | End: 2017-12-07 | Stop reason: SDUPTHER

## 2017-11-14 RX ORDER — PREDNISONE 20 MG/1
20 TABLET ORAL
Qty: 3 TAB | Refills: 0 | Status: ON HOLD | OUTPATIENT
Start: 2017-11-18 | End: 2017-11-20

## 2017-11-14 RX ORDER — PREDNISONE 10 MG/1
10 TABLET ORAL
Qty: 3 TAB | Refills: 0 | Status: ON HOLD | OUTPATIENT
Start: 2017-11-21 | End: 2017-11-20

## 2017-11-14 RX ORDER — OXYCODONE AND ACETAMINOPHEN 10; 325 MG/1; MG/1
1 TABLET ORAL
Qty: 15 TAB | Refills: 0 | Status: SHIPPED | OUTPATIENT
Start: 2017-11-14 | End: 2017-12-07

## 2017-11-14 RX ORDER — ALBUTEROL SULFATE 90 UG/1
1 AEROSOL, METERED RESPIRATORY (INHALATION)
Qty: 1 INHALER | Refills: 0 | Status: SHIPPED | OUTPATIENT
Start: 2017-11-14 | End: 2017-12-07 | Stop reason: SDUPTHER

## 2017-11-14 RX ORDER — PREDNISONE 20 MG/1
40 TABLET ORAL
Qty: 6 TAB | Refills: 0 | Status: SHIPPED | OUTPATIENT
Start: 2017-11-15 | End: 2017-11-20

## 2017-11-14 RX ORDER — AZITHROMYCIN 250 MG/1
250 TABLET, FILM COATED ORAL DAILY
Qty: 2 TAB | Refills: 0 | Status: SHIPPED | OUTPATIENT
Start: 2017-11-14 | End: 2017-11-20

## 2017-11-14 RX ADMIN — FAMOTIDINE 20 MG: 20 TABLET, FILM COATED ORAL at 08:32

## 2017-11-14 RX ADMIN — GUAIFENESIN 600 MG: 600 TABLET, EXTENDED RELEASE ORAL at 08:31

## 2017-11-14 RX ADMIN — BUDESONIDE 500 MCG: 0.5 INHALANT RESPIRATORY (INHALATION) at 09:50

## 2017-11-14 RX ADMIN — PREDNISONE 40 MG: 20 TABLET ORAL at 08:31

## 2017-11-14 RX ADMIN — AZITHROMYCIN 250 MG: 250 TABLET, FILM COATED ORAL at 08:31

## 2017-11-14 RX ADMIN — DOCUSATE SODIUM 100 MG: 100 CAPSULE, LIQUID FILLED ORAL at 08:32

## 2017-11-14 RX ADMIN — IPRATROPIUM BROMIDE AND ALBUTEROL SULFATE 3 ML: .5; 3 SOLUTION RESPIRATORY (INHALATION) at 09:50

## 2017-11-14 RX ADMIN — APIXABAN 10 MG: 5 TABLET, FILM COATED ORAL at 08:31

## 2017-11-14 RX ADMIN — OXYCODONE HYDROCHLORIDE AND ACETAMINOPHEN 1 TABLET: 10; 325 TABLET ORAL at 12:43

## 2017-11-14 RX ADMIN — INFLUENZA VIRUS VACCINE 0.5 ML: 15; 15; 15; 15 SUSPENSION INTRAMUSCULAR at 08:30

## 2017-11-14 RX ADMIN — OXYCODONE HYDROCHLORIDE AND ACETAMINOPHEN 1 TABLET: 10; 325 TABLET ORAL at 08:31

## 2017-11-14 NOTE — PROGRESS NOTES
Mr. Bhumi Esparza and I had a nice conversation and prayer about his goals when he leaves the hospital. If he should need anything else, please call me or the on-call .  conducted an initial consultation and Spiritual Assessment for Khalida Jaramillo, who is a 32 y.o.,male. Patients Primary Language is: [place]. According to the patients EMR Sikhism Affiliation is: [facility].     The reason the Patient came to the hospital is:   Patient Active Problem List    Diagnosis Date Noted    Pulmonary embolism ([facility] [address] 11/11/2017    PE (pulmonary thromboembolism) ([facility] [address] 11/11/2017    Status asthmaticus, non-allergic 11/03/2017        The  provided the following Interventions:  Initiated a relationship of care and support. Explored issues of rush, belief, spirituality and Latter day/ritual needs while hospitalized. Listened empathically. Provided chaplaincy education. Provided information about Spiritual Care Services. Offered prayer and assurance of continued prayers on patient's behalf. Chart reviewed. The following outcomes where achieved:  Patient shared limited information about both their medical narrative and spiritual journey/beliefs.  confirmed Patient's Sikhism Affiliation. Patient processed feeling about current hospitalization. Patient expressed gratitude for 's visit. Assessment:  Patient does not have any Latter day/cultural needs that will affect patients preferences in health care. There are no spiritual or Latter day issues which require intervention at this time. Plan:  Chaplains will continue to follow and will provide pastoral care on an as needed/requested basis.  recommends bedside caregivers page  on duty if patient shows signs of acute spiritual or emotional distress.     [facility], CPE Resident   Pager: [phone]  Phone: [phone]

## 2017-11-14 NOTE — ROUTINE PROCESS
Bedside and Verbal shift change report given to LUIS Devries RN  by Paty Pérez RN. Report included the following information SBAR, Kardex, MAR and Recent Results.

## 2017-11-14 NOTE — PROGRESS NOTES
Problem: Falls - Risk of  Goal: *Absence of Falls  Document Chavez Fall Risk and appropriate interventions in the flowsheet.    Outcome: Progressing Towards Goal  Fall Risk Interventions:  Mobility Interventions: Patient to call before getting OOB         Medication Interventions: Patient to call before getting OOB    Elimination Interventions: Call light in reach, Patient to call for help with toileting needs

## 2017-11-14 NOTE — DISCHARGE SUMMARY
[facility]  [facility]charge [facility]       Patient: Paul Gallagher Age: 32 y.o. : 1990 MR#: [id number] SSN: [id number]  PCP on record: Qasim Heaton MD  Admit date: 2017  Discharge date: 2017    Disposition:    [x]Home   []Home with Home Health   []SNF/NH   []Rehab   []Home with family   []Alternate Facility:____________________    Discharge Diagnoses:                             1. Acute bilateral pulmonary embolism / Acute left soleal DVT   2. Acute asthma exacerbation / bronchitis  3. Acute dyspnea multifactorial, asthma / bilateral PE  4. Substernal chest pain   5. RUFINA / obesity    6. Tobacco abuse     Discharge Medications:     Current Discharge Medication List      START taking these medications    Details   albuterol-ipratropium (DUO-NEB) 2.5 mg-0.5 mg/3 ml nebu 3 mL by Nebulization route every four (4) hours as needed. Qty: 100 mL, Refills: 0      famotidine (PEPCID) 20 mg tablet Take 1 Tab by mouth every twelve (12) hours. Qty: 15 Tab, Refills: 0      oxyCODONE-acetaminophen (PERCOCET 10)  mg per tablet Take 1 Tab by mouth every four (4) hours as needed. Max Daily Amount: 6 Tabs. Qty: 15 Tab, Refills: 0      budesonide (PULMICORT) 0.5 mg/2 mL nbsp 2 mL by Nebulization route two (2) times a day. Qty: 100 mL, Refills: 0      apixaban (ELIQUIS) 5 mg tablet                               Albuterol HFA 90 mcg Take 1 Puff by inhalation every six (4) hours. Qty: 1 Inhaler, Refills: 0    Take 2 Tabs by mouth two (2) times a day for 5 days. (10 doses). Then 1 tab twice daily afterwards for 3 months. Qty: 80 Tab, Refills: 0           CONTINUE these medications which have CHANGED    Details   azithromycin (ZITHROMAX) 250 mg tablet Take 1 Tab by mouth daily for 2 days. Qty: 2 Tab, Refills: 0      !! predniSONE (DELTASONE) 20 mg tablet Take 1 Tab by mouth daily (with breakfast) for 3 days.   Qty: 3 Tab, Refills: 0      !! predniSONE (DELTASONE) 10 mg tablet Take 1 Tab by mouth daily (with breakfast) for 3 days. Qty: 3 Tab, Refills: 0      !! predniSONE (DELTASONE) 20 mg tablet Take 2 Tabs by mouth daily (with breakfast) for 3 days. Qty: 6 Tab, Refills: 0       !! - Potential duplicate medications found. Please discuss with provider. CONTINUE these medications which have NOT CHANGED    Details   guaiFENesin ER (MUCINEX) 600 mg ER tablet Take 1 Tab by mouth two (2) times a day. Qty: 10 Tab, Refills: 0             STOP these medications which have changed  Combivent inhaler       Consults:  Pulmonary  -   Procedures: Echocardiogram  -     Significant Diagnostic Studies: CTA chest  -      Hospital Course by Problem   1. Acute bilateral pulmonary embolism / Acute left soleal DVT - Admitted with left leg pain and chest pain, CTA chest and Left leg PVL showed PE and DVT. Pulmonary consulted. Patient was started on Lovenox and switched to Eliquis per Pulmonary. Pt was informed on the risk / benefits of eliquis patient accepting and agrees to eliquis use. Per pulm cont Eliquis 10 mg PO BID for 7 days, then 5 mg PO BID for 3-6 months. Repeat Doppler in 3 months. 2. Acute asthma exacerbation / bronchitis- continued on prednisone, duoneb, pulmicort while inpatient. Continued on azithromycin with 2 doses left at discharge. Discharged with prednisone taper, duoneb, and pulmicort. 4.  Acute dyspnea multifactorial, asthma / bilateral PE; ambulatory oxygen sats % on RA before discharge. 5.  Substernal chest pain - likely impact of PE / low suspicion of cardiac etiology. Troponin wnl. Managed with percocet, and toradol. Discharged with Percocet. 6.  RUFINA / obesity - continued on CPAP use. 7.  Tobacco abuse - encouraged smoking cessation    Today's examination of the patient revealed:     Subjective:   Pt reports right-sided +substernal chest pain and left leg pain ongoing, with some improvement with pain medication.  Denies any SOB, N/V/D/C, fever or chills. Objective:   VS:   Visit Vitals    /77 (BP 1 Location: Left arm, BP Patient Position: Supine)    Pulse 73    Temp 98.1 °F (36.7 °C)    Resp 17    Ht 6' 4\" (1.93 m)    Wt 149.7 kg (330 lb)    SpO2 99%    BMI 40.17 kg/m2      Tmax/24hrs: Temp (24hrs), Av.2 °F (36.8 °C), Min:97.3 °F (36.3 °C), Max:98.7 °F (37.1 °C)     Input/Output:   Intake/Output Summary (Last 24 hours) at 17 1013  Last data filed at 17 2230   Gross per 24 hour   Intake              890 ml   Output             1300 ml   Net             -410 ml       General:  Alert, NAD  Cardiovascular:  RRR  Pulmonary:  LSC throughout; respiratory effort WNL  GI:  +BS in all four quadrants, soft, non-tender  Extremities:  No edema; or cyanosis  Additional:      Labs:    Recent Results (from the past 24 hour(s))   CARDIAC PANEL,(CK, CKMB & TROPONIN)    Collection Time: 17  1:23 PM   Result Value Ref Range    CK 47 39 - 308 U/L    CK - MB <1.0 <3.6 ng/ml    CK-MB Index  0.0 - 4.0 %     CALCULATION NOT PERFORMED WHEN RESULT IS BELOW LINEAR LIMIT    Troponin-I, Qt. <0.02 0.0 - 0.045 NG/ML     Additional Data Reviewed:     Condition:   Follow-up Appointments:   1. Your PCP: Hermelinda Duarte MD, within 7-10days  2. Your Pulmonologist: Adri Rizo within 2 weeks.      >30 minutes spent coordinating this discharge (review instructions/follow-up, prescriptions, preparing report for sign off)    Signed:  Fidel Oneil PA-C  2017  10:13 AM

## 2017-11-14 NOTE — DISCHARGE INSTRUCTIONS
Discharge Instructions    Patient: Nirmal Olsen MRN: [id number]  CSN: [id number]    YOB: 1990  Age: 32 y.o. Sex: male    DOA: 11/11/2017 LOS:  LOS: 3 days   Discharge Date:      DIET:  Cardiac Diet    ACTIVITY: Activity as tolerated      ADDITIONAL INFORMATION: If you experience any of the following symptoms but not limited to Fever, chills, nausea, vomiting, diarrhea, change in mentation, falling, bleeding, shortness of breath, chest pain, please call your primary care physician or return to the emergency room if you cannot get hold of your doctor:     FOLLOW UP CARE:  Dr. Leticia Kwon MD in 7-10 days. Please call and set up an appointment. Dr. Shari Olsen in 2 month      Nela Alves MD  11/14/2017 12:19 PM             Pulmonary Embolism: Care Instructions  Your Care Instructions    Pulmonary embolism is the sudden blockage of an artery in the lung. Blood clots in the deep veins of the leg or pelvis (deep vein thrombosis, or DVT) are the most common cause. These blood clots can travel to the lungs. Pulmonary embolism can be very serious. Because you have had one pulmonary embolism, you are at greater risk for having another one. But you can take steps to prevent another pulmonary embolism by following your doctor's instructions. You will probably take a prescription blood-thinning medicine to prevent blood clots. A blood thinner can stop a blood clot from growing larger and prevent new clots from forming. Follow-up care is a key part of your treatment and safety. Be sure to make and go to all appointments, and call your doctor if you are having problems. It's also a good idea to know your test results and keep a list of the medicines you take. How can you care for yourself at home? · Take your medicines exactly as prescribed. Call your doctor if you think you are having a problem with your medicine.  You will get more details on the specific medicines your doctor prescribes. · If you are taking a blood thinner, be sure you get instructions about how to take your medicine safely. Blood thinners can cause serious bleeding problems. Preventing future pulmonary embolisms  · Exercise. Keep blood moving in your legs to keep clots from forming. If you are traveling by car, stop every hour or so. Get out and walk around for a few minutes. If you are traveling by bus, train, or plane, get out of your seat and walk up and down the aisles every hour or so. You also can do leg exercises while you are seated. Pump your feet up and down by pulling your toes up toward your knees then pointing them down. · Get up out of bed as soon as possible after an illness or surgery. · Do not smoke. If you need help quitting, talk to your doctor about stop-smoking programs and medicines. These can increase your chances of quitting for good. · Check with your doctor before taking hormone or birth control pills. These may increase your risk of blot clots. · Ask your doctor about wearing compression stockings to help prevent blood clots in your legs. There are different types of stockings, and they need to fit right. So your doctor will recommend what you need. When should you call for help? Call 911 anytime you think you may need emergency care. For example, call if:  ? · You have shortness of breath. ? · You have chest pain. ? · You passed out (lost consciousness). ? · You cough up blood. ?Call your doctor now or seek immediate medical care if:  ? · You have new or worsening pain or swelling in your leg. ? Watch closely for changes in your health, and be sure to contact your doctor if:  ? · You do not get better as expected. Where can you learn more? Go to http://evangelina-anne-marie.info/. Enter M105 in the search box to learn more about \"Pulmonary Embolism: Care Instructions. \"  Current as of: March 20, 2017  Content Version: 11.4  © 3336-5335 Healthwise, [employer]. Care instructions adapted under license by [employer] (which disclaims liability or warranty for this information). If you have questions about a medical condition or this instruction, always ask your healthcare professional. [address] any warranty or liability for your use of this information. Learning About Deep Vein Thrombosis  What is deep vein thrombosis? A deep vein thrombosis (DVT) is a blood clot in certain veins of the legs, pelvis, or arms. The clot is usually in the legs. DVT may damage the vein and cause the area to ache, swell, and change color. DVT also can lead to sores. DVT in these veins needs to be treated because the clots can get bigger, break loose, and travel through the bloodstream to the lungs. A blood clot in a lung can cause death. Blood clots can form in the veins when you are not active for a long period of time. For example, they can form if you need to stay in bed because of a health problem or must sit for a long time on an airplane or in a car. Surgery or an injury can damage your blood vessels and cause a clot to form. Cancer also can cause DVT. And some people have blood that clots too easily, which is a problem that may run in families. A risk factor is something that makes you more likely to develop a disease. Here are some major risk factors for DVT:  · You have surgery. · You have to stay in bed for more than 3 days (such as in the hospital). · Your blood is likely to clot because of an injury, cancer, or inherited condition. Here are some minor risk factors for DVT:  · You take birth control hormones. · You are pregnant. · You are in a car or airplane for a long trip. What are the symptoms? Symptoms of DVT may include:  · Swelling in the affected area. · Redness and warmth in the affected area. · Pain or tenderness. You may have pain only when you touch the affected area or when you stand or walk.   If your doctor thinks you may have DVT, you will probably have an ultrasound test. You may have other tests as well. How can you prevent DVT? · Exercise your lower leg muscles to help blood flow in your legs. Point your toes up toward your head so the calves of your legs are stretched, then relax and repeat. This is a good exercise to do when you are sitting for long periods of time. · Get out of bed as soon as you can after an illness or surgery. If you need to stay in bed, do the leg exercise noted above every hour when you are awake. · Use special stockings called compression stockings. These stockings are tight at the feet with a gradually looser fit on the leg. Many doctors recommend that you wear compression stockings during a journey longer than 8 hours. · Take breaks when you are on long trips. Stop the car and walk around. On long airplane flights, walk up and down the aisle hourly, flex and point your feet every 20 minutes while sitting, and drink plenty of water. · Take blood-thinning medicines after some types of surgery if your doctor recommends it. Blood thinners also may be used if you are likely to develop clots. How is DVT treated? Treatment for DVT usually involves taking blood thinners. These medicines are given through a vein (intravenously, or IV) or as a pill. Talk with your doctor about which medicine is right for you. Your doctor also may suggest that you prop up or elevate your leg when possible, take walks, and wear compression stockings. These measures may help reduce the pain and swelling that can happen with DVT. Follow-up care is a key part of your treatment and safety. Be sure to make and go to all appointments, and call your doctor if you are having problems. It's also a good idea to know your test results and keep a list of the medicines you take. Where can you learn more? Go to http://evangelina-anne-marie.info/.   Enter I074 in the search box to learn more about \"Learning About Deep Vein Thrombosis. \"  Current as of: March 20, 2017  Content Version: 11.4  © 5684-0567 [employer]. Care instructions adapted under license by [employer] (which disclaims liability or warranty for this information). If you have questions about a medical condition or this instruction, always ask your healthcare professional. [address] any warranty or liability for your use of this information. Patient armband removed and shredded      DISCHARGE SUMMARY from Nurse    PATIENT INSTRUCTIONS:    After general anesthesia or intravenous sedation, for 24 hours or while taking prescription Narcotics:  · Limit your activities  · Do not drive and operate hazardous machinery  · Do not make important personal or business decisions  · Do  not drink alcoholic beverages  · If you have not urinated within 8 hours after discharge, please contact your surgeon on call. Report the following to your surgeon:  · Excessive pain, swelling, redness or odor of or around the surgical area  · Temperature over 100.5  · Nausea and vomiting lasting longer than 4 hours or if unable to take medications  · Any signs of decreased circulation or nerve impairment to extremity: change in color, persistent  numbness, tingling, coldness or increase pain  · Any questions    What to do at Home:  Recommended activity: Activity as tolerated    If you experience any of the following symptoms Nausea, vomiting, diarrhea, fever greater than 100.5, dizziness, severe headache, shortness of breath, chest pain, increased pain, please follow up with PCP. *  Please give a list of your current medications to your Primary Care Provider. *  Please update this list whenever your medications are discontinued, doses are      changed, or new medications (including over-the-counter products) are added. *  Please carry medication information at all times in case of emergency situations.     These are general instructions for a healthy lifestyle:    No smoking/ No tobacco products/ Avoid exposure to second hand smoke  Surgeon General's Warning:  Quitting smoking now greatly reduces serious risk to your health. Obesity, smoking, and sedentary lifestyle greatly increases your risk for illness    A healthy diet, regular physical exercise & weight monitoring are important for maintaining a healthy lifestyle    You may be retaining fluid if you have a history of heart failure or if you experience any of the following symptoms:  Weight gain of 3 pounds or more overnight or 5 pounds in a week, increased swelling in our hands or feet or shortness of breath while lying flat in bed. Please call your doctor as soon as you notice any of these symptoms; do not wait until your next office visit. Recognize signs and symptoms of STROKE:    F-face looks uneven    A-arms unable to move or move unevenly    S-speech slurred or non-existent    T-time-call 911 as soon as signs and symptoms begin-DO NOT go       Back to bed or wait to see if you get better-TIME IS BRAIN. Warning Signs of HEART ATTACK     Call 911 if you have these symptoms:   Chest discomfort. Most heart attacks involve discomfort in the center of the chest that lasts more than a few minutes, or that goes away and comes back. It can feel like uncomfortable pressure, squeezing, fullness, or pain.  Discomfort in other areas of the upper body. Symptoms can include pain or discomfort in one or both arms, the back, neck, jaw, or stomach.  Shortness of breath with or without chest discomfort.  Other signs may include breaking out in a cold sweat, nausea, or lightheadedness. Don't wait more than five minutes to call 911 - MINUTES MATTER! Fast action can save your life. Calling 911 is almost always the fastest way to get lifesaving treatment.  Emergency Medical Services staff can begin treatment when they arrive -- up to an hour sooner than if someone gets to the [facility] [place] by [facility]. The discharge information has been reviewed with the patient. The patient verbalized understanding. Discharge medications reviewed with the patient and appropriate educational materials and side effects teaching were provided.   ___________________________________________________________________________________________________________________________________

## 2017-11-14 NOTE — PROGRESS NOTES
Pt A&Ox4, VSS aside from morning /77 may be contributed to patient being in pain, NSR on tele box with aHR in the 70-90's, c/o 9-10 out of 10 L calf and chest pain (DVT & PE) receiving PRN Percocet twice with little to no relief as verbalized by patient. Patient on Eliquis for anticoagulation. Stressed importance of following up with PCP as he may need to switch to Coumadin for follow-up and anticoagulation monitoring, including repeat doppler for DVT evaluation. Lungs CTA, diminished throughout. SpO2 stable on RA. Performed walking oxygen test with patient around lap of unit, SpO2 remained % on RA. No IV access, refusing access as patient is being discharged. Received flu vaccination and appropriate [employer] information printout. Expressed to patient importance of non-smoking and to dial 9-1-1 with increased SOB, increased pain in calves, numbness/tingling and signs of worsening DVT/PE. Verbalized information. Patient states no BM since Friday, active bowel sounds, on Colace only this shift. Explained role of opioid pain medications and limited mobility effects on constipation. Discussed Miralax and/or Dulcolax OTC. Active bowel sounds x all 4 quadrants, no n/v, abdomen soft, round, obese, non-tender, non-distended. I have reviewed discharge instructions with the patient and significant other. The patient and significant other verbalized understanding. Discharge medications reviewed with patient and significant other and appropriate educational materials and side effects teaching were provided. Patient armband removed and shredded. All medications filled here by [facility] aside from [address]. [facility]. All personal affects and belongings with patient, accompanied by significant other. Prescription for repeat US for LLE DVT provided. No further clinical issues or concerns regarding the care of this patient.   Patient left floor at 14:00.

## 2017-11-14 NOTE — ROUTINE PROCESS
Bedside and Verbal shift change report given to Tonio Scruggs (oncoming nurse) by Michael Lai RN (offgoing nurse). Report given with SBAR, Kardex, Intake/Output, MAR, Accordion and Recent Results.

## 2017-11-15 ENCOUNTER — TELEPHONE (OUTPATIENT)
Dept: OTHER | Age: 27
End: 2017-11-15

## 2017-11-15 NOTE — TELEPHONE ENCOUNTER
Called pt to follow up on recent hospitalization here at [facility]. Pt was admitted w/ Bilateral pulm emboli and discharged home yesterday. He was given rx which he has gotten filled and is taking as directed.     Pt has f/u appt with his PCP on the 20th and also will be following up with pulmonary MD.    Steffanie Rey RN

## 2017-11-16 ENCOUNTER — HOSPITAL ENCOUNTER (INPATIENT)
Age: 27
LOS: 4 days | Discharge: HOME | DRG: 885 | End: 2017-11-20
Attending: EMERGENCY MEDICINE | Admitting: PSYCHIATRY & NEUROLOGY
Payer: SELF-PAY

## 2017-11-16 DIAGNOSIS — F39 MOOD DISORDER (HCC): Primary | ICD-10-CM

## 2017-11-16 PROBLEM — F32.A DEPRESSION: Status: ACTIVE | Noted: 2017-11-16

## 2017-11-16 PROBLEM — F33.2 MDD (MAJOR DEPRESSIVE DISORDER), RECURRENT SEVERE, WITHOUT PSYCHOSIS (HCC): Status: ACTIVE | Noted: 2017-11-16

## 2017-11-16 PROBLEM — R79.89 ABNORMAL TSH: Status: ACTIVE | Noted: 2017-11-16

## 2017-11-16 LAB
ALBUMIN SERPL-MCNC: 3.5 G/DL (ref 3.4–5)
ALBUMIN/GLOB SERPL: 0.9 {RATIO} (ref 0.8–1.7)
ALP SERPL-CCNC: 53 U/L (ref 45–117)
ALT SERPL-CCNC: 35 U/L (ref 16–61)
AMPHET UR QL SCN: NEGATIVE
ANION GAP SERPL CALC-SCNC: 8 MMOL/L (ref 3–18)
AST SERPL-CCNC: 15 U/L (ref 15–37)
BARBITURATES UR QL SCN: NEGATIVE
BASOPHILS # BLD: 0 K/UL (ref 0–0.06)
BASOPHILS NFR BLD: 0 % (ref 0–3)
BENZODIAZ UR QL: NEGATIVE
BILIRUB SERPL-MCNC: 0.5 MG/DL (ref 0.2–1)
BUN SERPL-MCNC: 18 MG/DL (ref 7–18)
BUN/CREAT SERPL: 17 (ref 12–20)
CALCIUM SERPL-MCNC: 8.5 MG/DL (ref 8.5–10.1)
CANNABINOIDS UR QL SCN: NEGATIVE
CHLORIDE SERPL-SCNC: 104 MMOL/L (ref 100–108)
CO2 SERPL-SCNC: 27 MMOL/L (ref 21–32)
COCAINE UR QL SCN: POSITIVE
CREAT SERPL-MCNC: 1.05 MG/DL (ref 0.6–1.3)
DIFFERENTIAL METHOD BLD: ABNORMAL
EOSINOPHIL # BLD: 0.1 K/UL (ref 0–0.4)
EOSINOPHIL NFR BLD: 1 % (ref 0–5)
ERYTHROCYTE [DISTWIDTH] IN BLOOD BY AUTOMATED COUNT: 14.8 % (ref 11.6–14.5)
ETHANOL SERPL-MCNC: <3 MG/DL (ref 0–3)
GLOBULIN SER CALC-MCNC: 4 G/DL (ref 2–4)
GLUCOSE SERPL-MCNC: 88 MG/DL (ref 74–99)
HCT VFR BLD AUTO: 45.8 % (ref 36–48)
HDSCOM,HDSCOM: ABNORMAL
HGB BLD-MCNC: 16.4 G/DL (ref 13–16)
LYMPHOCYTES # BLD: 4.9 K/UL (ref 0.8–3.5)
LYMPHOCYTES NFR BLD: 33 % (ref 20–51)
MCH RBC QN AUTO: 29.2 PG (ref 24–34)
MCHC RBC AUTO-ENTMCNC: 35.8 G/DL (ref 31–37)
MCV RBC AUTO: 81.5 FL (ref 74–97)
METHADONE UR QL: NEGATIVE
MONOCYTES # BLD: 1 K/UL (ref 0–1)
MONOCYTES NFR BLD: 7 % (ref 2–9)
NEUTS SEG # BLD: 8.8 K/UL (ref 1.8–8)
NEUTS SEG NFR BLD: 59 % (ref 42–75)
OPIATES UR QL: POSITIVE
PCP UR QL: NEGATIVE
PLATELET # BLD AUTO: 216 K/UL (ref 135–420)
PLATELET COMMENTS,PCOM: ABNORMAL
PMV BLD AUTO: 10.1 FL (ref 9.2–11.8)
POTASSIUM SERPL-SCNC: 3.9 MMOL/L (ref 3.5–5.5)
PROT SERPL-MCNC: 7.5 G/DL (ref 6.4–8.2)
RBC # BLD AUTO: 5.62 M/UL (ref 4.7–5.5)
RBC MORPH BLD: ABNORMAL
SODIUM SERPL-SCNC: 139 MMOL/L (ref 136–145)
T4 FREE SERPL-MCNC: 1.4 NG/DL (ref 0.7–1.5)
TSH SERPL DL<=0.05 MIU/L-ACNC: 8.06 UIU/ML (ref 0.36–3.74)
WBC # BLD AUTO: 14.8 K/UL (ref 4.6–13.2)

## 2017-11-16 PROCEDURE — 84439 ASSAY OF FREE THYROXINE: CPT | Performed by: EMERGENCY MEDICINE

## 2017-11-16 PROCEDURE — 65220000003 HC RM SEMIPRIVATE PSYCH

## 2017-11-16 PROCEDURE — 80307 DRUG TEST PRSMV CHEM ANLYZR: CPT | Performed by: EMERGENCY MEDICINE

## 2017-11-16 PROCEDURE — 74011250637 HC RX REV CODE- 250/637: Performed by: PSYCHIATRY & NEUROLOGY

## 2017-11-16 PROCEDURE — 84443 ASSAY THYROID STIM HORMONE: CPT | Performed by: EMERGENCY MEDICINE

## 2017-11-16 PROCEDURE — 80053 COMPREHEN METABOLIC PANEL: CPT | Performed by: EMERGENCY MEDICINE

## 2017-11-16 PROCEDURE — 85025 COMPLETE CBC W/AUTO DIFF WBC: CPT | Performed by: EMERGENCY MEDICINE

## 2017-11-16 PROCEDURE — 99283 EMERGENCY DEPT VISIT LOW MDM: CPT

## 2017-11-16 RX ORDER — HALOPERIDOL 5 MG/1
5 TABLET ORAL
Status: DISCONTINUED | OUTPATIENT
Start: 2017-11-16 | End: 2017-11-20 | Stop reason: HOSPADM

## 2017-11-16 RX ORDER — PREDNISONE 10 MG/1
40 TABLET ORAL
Status: COMPLETED | OUTPATIENT
Start: 2017-11-17 | End: 2017-11-17

## 2017-11-16 RX ORDER — PREDNISONE 10 MG/1
40 TABLET ORAL
Status: DISPENSED | OUTPATIENT
Start: 2017-11-16 | End: 2017-11-16

## 2017-11-16 RX ORDER — TRAZODONE HYDROCHLORIDE 50 MG/1
50 TABLET ORAL
Status: DISCONTINUED | OUTPATIENT
Start: 2017-11-16 | End: 2017-11-16

## 2017-11-16 RX ORDER — IBUPROFEN 400 MG/1
400 TABLET ORAL
Status: DISCONTINUED | OUTPATIENT
Start: 2017-11-16 | End: 2017-11-20 | Stop reason: HOSPADM

## 2017-11-16 RX ORDER — LORAZEPAM 1 MG/1
1-2 TABLET ORAL
Status: DISCONTINUED | OUTPATIENT
Start: 2017-11-16 | End: 2017-11-20 | Stop reason: HOSPADM

## 2017-11-16 RX ORDER — PREDNISONE 10 MG/1
10 TABLET ORAL
Status: DISCONTINUED | OUTPATIENT
Start: 2017-11-21 | End: 2017-11-20 | Stop reason: HOSPADM

## 2017-11-16 RX ORDER — TRAZODONE HYDROCHLORIDE 50 MG/1
50 TABLET ORAL
Status: DISCONTINUED | OUTPATIENT
Start: 2017-11-16 | End: 2017-11-20 | Stop reason: HOSPADM

## 2017-11-16 RX ORDER — OXYCODONE AND ACETAMINOPHEN 10; 325 MG/1; MG/1
1 TABLET ORAL
Status: DISCONTINUED | OUTPATIENT
Start: 2017-11-16 | End: 2017-11-17

## 2017-11-16 RX ORDER — FLUOXETINE HYDROCHLORIDE 20 MG/1
20 CAPSULE ORAL DAILY
Status: DISCONTINUED | OUTPATIENT
Start: 2017-11-17 | End: 2017-11-17

## 2017-11-16 RX ORDER — FLUOXETINE 10 MG/1
10 CAPSULE ORAL DAILY
Status: COMPLETED | OUTPATIENT
Start: 2017-11-16 | End: 2017-11-16

## 2017-11-16 RX ORDER — FAMOTIDINE 20 MG/1
20 TABLET, FILM COATED ORAL 2 TIMES DAILY
Status: DISCONTINUED | OUTPATIENT
Start: 2017-11-16 | End: 2017-11-20 | Stop reason: HOSPADM

## 2017-11-16 RX ORDER — LORAZEPAM 2 MG/ML
1-2 INJECTION INTRAMUSCULAR
Status: DISCONTINUED | OUTPATIENT
Start: 2017-11-16 | End: 2017-11-20 | Stop reason: HOSPADM

## 2017-11-16 RX ORDER — PREDNISONE 10 MG/1
20 TABLET ORAL
Status: COMPLETED | OUTPATIENT
Start: 2017-11-18 | End: 2017-11-20

## 2017-11-16 RX ORDER — HALOPERIDOL 5 MG/ML
5 INJECTION INTRAMUSCULAR
Status: DISCONTINUED | OUTPATIENT
Start: 2017-11-16 | End: 2017-11-20 | Stop reason: HOSPADM

## 2017-11-16 RX ADMIN — IBUPROFEN 400 MG: 400 TABLET ORAL at 20:31

## 2017-11-16 RX ADMIN — OXYCODONE HYDROCHLORIDE AND ACETAMINOPHEN 1 TABLET: 10; 325 TABLET ORAL at 12:03

## 2017-11-16 RX ADMIN — FLUOXETINE 10 MG: 10 CAPSULE ORAL at 13:21

## 2017-11-16 RX ADMIN — FAMOTIDINE 20 MG: 20 TABLET ORAL at 12:03

## 2017-11-16 RX ADMIN — APIXABAN 10 MG: 5 TABLET, FILM COATED ORAL at 20:22

## 2017-11-16 RX ADMIN — FAMOTIDINE 20 MG: 20 TABLET ORAL at 20:22

## 2017-11-16 NOTE — H&P
History and Physical        Patient: Olivia Ruiz               Sex: male          DOA: 11/16/2017         YOB: 1990      Age:  32 y.o.        LOS:  LOS: 0 days        HPI:     Olivia Ruiz is a 32 y.o. male who was admitted experiencing hopelessness and depression. Active Problems:    Depression (11/16/2017)      Abnormal TSH (11/16/2017)        Past Medical History:   Diagnosis Date    Abnormal TSH 11/16/2017    Asthma     Thromboembolus ([address]        History reviewed. No pertinent surgical history. History reviewed. No pertinent family history. Social History     Social History    Marital status:      Spouse name: N/A    Number of children: 5    Years of education:      Social History Main Topics    Smoking status: Former Smoker    Smokeless tobacco: Never Used    Alcohol use Yes    Drug use: No    Sexual activity: Not Asked     Other Topics Concern    None     Social History Narrative   States he is homeless. Reports appetite and sleep have been okay. He is unemployed. Prior to Admission medications    Medication Sig Start Date End Date Taking? Authorizing Provider   azithromycin (ZITHROMAX) 250 mg tablet Take 1 Tab by mouth daily for 2 days. 11/14/17 11/16/17 Yes Devi Portillo PA-C   famotidine (PEPCID) 20 mg tablet Take 1 Tab by mouth every twelve (12) hours. 11/14/17  Yes Devi Portillo PA-C   oxyCODONE-acetaminophen (PERCOCET 10)  mg per tablet Take 1 Tab by mouth every four (4) hours as needed. Max Daily Amount: 6 Tabs. 11/14/17  Yes Devi Portillo PA-C   apixaban (ELIQUIS) 5 mg tablet Take 2 Tabs by mouth two (2) times a day for 5 days. (10 doses). Then 1 tab twice daily afterwards for 3 months. 11/14/17 11/19/17 Yes Devi Portillo PA-C   predniSONE (DELTASONE) 20 mg tablet Take 1 Tab by mouth daily (with breakfast) for 3 days.  11/18/17 11/21/17 Yes Devi Portillo PA-C   predniSONE (DELTASONE) 10 mg tablet Take 1 Tab by mouth daily (with breakfast) for 3 days. 11/21/17 11/24/17 Yes Devi Portillo PA-C   predniSONE (DELTASONE) 20 mg tablet Take 2 Tabs by mouth daily (with breakfast) for 3 days. 11/15/17 11/18/17 Yes Devi Portillo PA-C   albuterol-ipratropium (DUO-NEB) 2.5 mg-0.5 mg/3 ml nebu 3 mL by Nebulization route every four (4) hours as needed. 11/14/17   Devi Portillo PA-C   budesonide (PULMICORT) 0.5 mg/2 mL nbsp 2 mL by Nebulization route two (2) times a day. 11/14/17   Devi Portillo PA-C   albuterol (PROVENTIL HFA, VENTOLIN HFA, PROAIR HFA) 90 mcg/actuation inhaler Take 1 Puff by inhalation every four (4) hours as needed for Wheezing. 11/14/17   Devi Portillo PA-C   guaiFENesin ER (MUCINEX) 600 mg ER tablet Take 1 Tab by mouth two (2) times a day. 11/6/17   Devi Portillo PA-C       No Known Allergies    Review of Systems  A comprehensive review of systems was negative. Physical Exam:      Visit Vitals    /89    Pulse 89    Temp 100 °F (37.8 °C)    Resp 19    Ht 6' 4\" (1.93 m)    Wt 300 lb (136.1 kg)    SpO2 98%    BMI 36.52 kg/m2       Physical Exam:    General:  Alert, cooperative, no distress, well developed, well nourished,obese,AA male, appears stated age. Eyes:  Conjunctivae/corneas clear. PERRL, EOMs intact. Fundi benign   Ears:  Normal TMs and external ear canals both ears. Nose: Nares normal. Septum midline. Mucosa normal. No drainage or sinus tenderness. Mouth/Throat: Lips, mucosa, and tongue normal. Teeth and gums normal.   Neck: Supple, symmetrical, trachea midline, no adenopathy, thyroid: no enlargement/tenderness/nodules, no carotid bruit and no JVD. Back:   Symmetric, no curvature. ROM normal. No CVA tenderness. Lungs:   Clear to auscultation bilaterally. Heart:  Regular rate and rhythm, S1, S2 normal, no murmur, click, rub or gallop. Abdomen:   Soft, non-tender, obese. Bowel sounds normal. No masses,  No organomegaly.    Extremities: Extremities normal, atraumatic, no cyanosis or edema. LL leg TTP. Pulses: 2+ and symmetric all extremities. Skin: Skin color, texture, turgor normal. No rashes or lesions   Lymph nodes: Cervical, supraclavicular, and axillary nodes normal.   Neurologic: CNII-XII intact. Normal strength, sensation and reflexes throughout.              Assessment/Plan     Depression  Hopeless  Blood clots (lower legs)  Labs reviewed (+Cocaine, Opiates)  Continue treatment per physician's orders

## 2017-11-16 NOTE — BSMART NOTE
Comprehensive Assessment Form Part 1    Section I - Disposition      The Medical Doctor to Psychiatrist conference was completed. The Medical Doctor is in agreement with Psychiatrist disposition because of (reason) depression with suicidal thoughts. The plan is admit to inpatient . The on-call Psychiatrist consulted was Dr. Melissa Pearl. The admitting Psychiatrist will be Dr. Melissa Pearl. The admitting Diagnosis is Depression. Admitted to room 127/01  Unit ADCD      Section II - Integrated Summary  Summary:  32year old male who presented to the ER reporting feelings of depression. Interviewed in room 14 @ the request of Dr. Raul Meredith. Patient dressed in his personal clothing. Neat and clean appearance. Alert and oriented. Flat/blunted affect. Made little eye contact. \" I don't know what's going on. I feel bad. I can't think right. I'm forgetting things. I know if this keeps getting worse I will kill myself. \" Speaking in a low volume. Patient reports he is unable to function due to his current mental problems. Reports he is forgetting things which has caused him problems with his employer, patient is a . Reports he is unable to have a restful sleep. Feels lack of energy and motivation. Reports he is losing everything. Reports he and his wife have recently . He currently has no place to live. Also was admitted medically for Pulmonary emboli, discharged from [facility] 11/15/17. Inpatient: reports he has been admitted Psychiatrically to [facility] and to [facility]    Outpatient: reports maybe 4 years ago he attempt to seek services from [address] [place] [address] but did not follow through. Legal: denies. Is  from his wife    The patient is deemed competent to provide informed consent. The Chief Complaint is severe depression  . The Precipitant Factors are separation from wife, work issues, currently reports has no place to stay, and his recent medical problems.       Section V - Substance Abuse  The patient is using substances. The patient is using cocaine     Urine drug screen positive for cocaine and opiates ( prescribed Percocet for pain from DVT in leg )    TSH elevated- Dr. Alexandre Mendez recommends Endocrine consult.     Marilee Edward RN

## 2017-11-16 NOTE — BSMART NOTE
ART THERAPY GROUP PROGRESS NOTE    PATIENT SCHEDULED FOR GROUP AT: 14:15    ATTENDANCE: Full    PARTICIPATION LEVEL: Participates fully in the art process    ATTENTION LEVEL: Able to focus on task    FOCUS: Problem-solving    SYMBOLIC & THEMATIC CONTENT AS NOTED IN IMAGERY: He was calm and presented with a blunted affect. He kept to himself unless directly prompted. Imagery lacked detail and he appeared to lack motivation. He spoke softly and identified that he has trouble managing and regulating \"anger. \" Group discussed healthy and effective means to manage anger and emotions.

## 2017-11-16 NOTE — BH NOTES
Patient arrived on the unit from the ER with complaints of not feeling right for about a year. Patient denies active SI but does endorse depression. Patient was medically admitted to [facility] for DVTs and PEs and was released yesterday only to come back to ER for these feelings of sadness. Patient admits to being homeless since June and his wife left him around the same time. Patient complains of 10/10 pain in his left leg from the DVT. Patient appears sad on assessment. Patient does not make eye contact during assessment. Patient attempted to take his Percocet while on the unit and was informed he could not take his own medications. Patient appears flat but is cooperative.      Patient has medications in prescriptions bottles for:    Combivent Respimat  Pepcid 20mg Q 12 hours  Prednisone taper  Azithromycin 250 mg daily for 2 days  Percocet  Q 4 hours prn

## 2017-11-16 NOTE — IP AVS SNAPSHOT
Kin Tamayo 
 
 
 [address] [facility] Patient: Luis Manuel Mcclain MRN: [id number] :1990 My Medications STOP taking these medications   
 azithromycin 250 mg tablet Commonly known as:  Stephane Listen TAKE these medications as instructed Instructions Each Dose to Equal  
 Morning Noon Evening Bedtime  
 albuterol 90 mcg/actuation inhaler Commonly known as:  PROVENTIL HFA, VENTOLIN HFA, PROAIR HFA Your last dose was: Your next dose is: Take 1 Puff by inhalation every four (4) hours as needed for Wheezing. 1 Puff  
    
   
   
   
  
 albuterol-ipratropium 2.5 mg-0.5 mg/3 ml Nebu Commonly known as:  Yesseniasheri Nogueira Your last dose was: Your next dose is:    
   
   
 3 mL by Nebulization route every four (4) hours as needed. 3 mL * apixaban 5 mg tablet Commonly known as:  Jyotsna Bear Your last dose was: Your next dose is: Take 2 Tabs by mouth two (2) times a day. (10 doses). Then 1 tab twice daily afterwards for 3 months. Indications: PULMONARY THROMBOEMBOLISM PREVENTION  
 10 mg  
    
   
   
   
  
 * apixaban 5 mg tablet Commonly known as:  Jyotsna Bear Your last dose was: Your next dose is: Take 1 tab (5mg) po BID for 3 months starting 2017. Indications: PULMONARY THROMBOEMBOLISM PREVENTION  
     
   
   
   
  
 budesonide 0.5 mg/2 mL Nbsp Commonly known as:  PULMICORT Your last dose was: Your next dose is:    
   
   
 2 mL by Nebulization route two (2) times a day. 500 mcg  
    
   
   
   
  
 famotidine 20 mg tablet Commonly known as:  PEPCID Your last dose was: Your next dose is: Take 1 Tab by mouth every twelve (12) hours. 20 mg FLUoxetine 40 mg capsule Commonly known as:  PROzac Start taking on:  2017 Your last dose was: Your next dose is: Take 1 Cap by mouth daily. Indications: major depressive disorder 40 mg  
    
   
   
   
  
 guaiFENesin  mg ER tablet Commonly known as:  [employer] Your last dose was: Your next dose is: Take 1 Tab by mouth two (2) times a day. 600 mg  
    
   
   
   
  
 oxyCODONE-acetaminophen  mg per tablet Commonly known as:  PERCOCET 10 Your last dose was: Your next dose is: Take 1 Tab by mouth every four (4) hours as needed. Max Daily Amount: 6 Tabs. 1 Tab * predniSONE 20 mg tablet Commonly known as:  Quirino Sprawjulien Start taking on:  11/21/2017 Your last dose was: Your next dose is: Take 1 Tab by mouth daily (with breakfast) for 2 days. Indications: outside medication 20 mg  
    
   
   
   
  
 * predniSONE 10 mg tablet Commonly known as:  Quirino Sprawls Start taking on:  11/24/2017 Your last dose was: Your next dose is: Take 1 Tab by mouth daily (with breakfast) for 3 days. Indications: steroid taper 10 mg  
    
   
   
   
  
 traZODone 50 mg tablet Commonly known as:  Aurelio Rodriguez Your last dose was: Your next dose is: Take 1 Tab by mouth nightly. Indications: insomnia associated with depression 50 mg  
    
   
   
   
  
 * Notice: This list has 4 medication(s) that are the same as other medications prescribed for you. Read the directions carefully, and ask your doctor or other care provider to review them with you. Where to Get Your Medications Information on where to get these meds will be given to you by the nurse or doctor. ! Ask your nurse or doctor about these medications  
  apixaban 5 mg tablet  
 apixaban 5 mg tablet FLUoxetine 40 mg capsule  
 predniSONE 10 mg tablet  
 predniSONE 20 mg tablet  
 traZODone 50 mg tablet

## 2017-11-16 NOTE — ED NOTES
PT is pacing in the hallway in front of room.   When asked to provide blood sample, pt refused stating \"the broad messed me up last time\"

## 2017-11-16 NOTE — PROGRESS NOTES
conducted an initial consultation and Spiritual Assessment for Vicki Valentine, who is a 32 y.o.,male. Patients Primary Language is: payasUgym. According to the patients EMR Hindu Affiliation is: [facility].     The reason the Patient came to the hospital is:   Patient Active Problem List    Diagnosis Date Noted    Depression 11/16/2017    Pulmonary embolism ([address] 11/11/2017    PE (pulmonary thromboembolism) ([facility] [address] 11/11/2017    Status asthmaticus, non-allergic 11/03/2017        The  provided the following Interventions:  Initiated a relationship of care and support. Explored issues of rush, belief, spirituality and Congregational/ritual needs while hospitalized. Listened empathically to patient's stories of medical journey. Provided information about Spiritual Care Services. Offered prayer and assurance of continued prayers on patient's behalf. The following outcomes where achieved:  Patient shared limited information about both their medical narrative and spiritual journey/beliefs.  confirmed Patient's Hindu Affiliation: [facility]  Patient processed feeling about current hospitalization. Patient expressed gratitude for 's visit. Assessment:  Patient does not have any Congregational/cultural needs that will affect patients preferences in health care. There are no spiritual or Congregational issues which require intervention at this time. Plan:  Chaplains will continue to follow and will provide pastoral care on an as needed/requested basis.  recommends bedside caregivers page  on duty if patient shows signs of acute spiritual or emotional distress.       Becky Lindsay, [address] [place] [address]  Spiritual Care  [phone]

## 2017-11-16 NOTE — ED NOTES
Security asked to assist in getting the patient to stay inside of his room. Patient notified of the process for seeing crisis, which includes to be medically cleared by obtaining blood and urine, patient refuses to allow any staff to draw his blood. Provider notified, Arlette Schofield is currently in the hallway talking to patient.

## 2017-11-16 NOTE — ED PROVIDER NOTES
HPI Comments: Seen at: 11/16/17 1:51 AM    Bladimir Singh is a 32 y.o. male with hx of asthma and thromboembolus presenting to the ED with c/o worsening mental health problem. Pt states he is depressed \"I just don't know what's going on<, I need help with life\". Reports he is \"dying, his lungs are messed up\" Pt denies SI, HI or hx of mood disorders. No other sx or complaints given at this time. PCP: Tyron Parsons MD      The history is provided by the patient. Past Medical History:   Diagnosis Date    Asthma     Thromboembolus [facility])        History reviewed. No pertinent surgical history. History reviewed. No pertinent family history. Social History     Social History    Marital status:      Spouse name: N/A    Number of children: N/A    Years of education: N/A     Occupational History    Not on file. Social History Main Topics    Smoking status: Former Smoker    Smokeless tobacco: Never Used    Alcohol use Yes    Drug use: No    Sexual activity: Not on file     Other Topics Concern    Not on file     Social History Narrative         ALLERGIES: Review of patient's allergies indicates no known allergies. Review of Systems   Constitutional: Negative for fever. Psychiatric/Behavioral: Negative for suicidal ideas. Depressed     All other systems reviewed and are negative. Vitals:    11/16/17 0012   BP: 136/86   Pulse: 95   Resp: 21   Temp: 97.9 °F (36.6 °C)   SpO2: 96%   Weight: 136.1 kg (300 lb)            Physical Exam     .Patient Vitals for the past 12 hrs:   Temp Pulse Resp BP SpO2   11/16/17 0012 97.9 °F (36.6 °C) 95 21 136/86 96 %     Gen: Well developed, well nourished 32 y.o. male  HEENT: Normocephalic, atraumatic  Respiratory: No accessory muscle use No wheeze, No rales, No rhonchi. Normal chest wall excursion. No subcutaneous air, no rib crepitus  Cardiovascular: Regular rhythm and rate, Normal pulses, Normal perfusion.  No edema  Gastrointestinal: Non distended, Non tender, No masses. No ascites. No organomegaly. No evidence of trauma  Musculoskeletal: Full range of motion at all other tested joints. No joint effusions. Neuro: Normal strength, Normal sensation. Normal speech. No ataxia. Cranial Nerves II-XII normal as tested. Skin: No rash, petechia or purpura. Warm and dry  Psyche: No suicidal ideation, No homicidal ideation. No hallucinations. Organized thoughts. Heme: Normal  : Deferred      MDM  ED Course       Procedures    Vitals:  Patient Vitals for the past 12 hrs:   Temp Pulse Resp BP SpO2   11/16/17 0012 97.9 °F (36.6 °C) 95 21 136/86 96 %       Medications Ordered:  Medications - No data to display    Lab Findings:  No results found for this or any previous visit (from the past 12 hour(s)). EKG Interpretation by ED physician:      X-ray, CT or radiology findings or impressions:  No orders to display       Progress notes, consult notes, or additional procedure notes:    4:01 AM: Pt is leaving AMA. Pt is refusing to have labs drawn. 4:38 AM: Pt is now willing to have blood drawn, pt is staying to be evaluated. 6:23 AM Consult: I discussed care with Crisis. It was a standard discussion including patient history, chief complaint, available diagnostic results, and predicted treatment course. Were made aware of pt's case. Will come evaluate pt. Diagnosis: No diagnosis found. 1. Mood disorder ([facility] [address]        Disposition:     Follow-up Information     None           Patient's Medications   Start Taking    No medications on file   Continue Taking    ALBUTEROL (PROVENTIL HFA, VENTOLIN HFA, PROAIR HFA) 90 MCG/ACTUATION INHALER    Take 1 Puff by inhalation every four (4) hours as needed for Wheezing. ALBUTEROL-IPRATROPIUM (DUO-NEB) 2.5 MG-0.5 MG/3 ML NEBU    3 mL by Nebulization route every four (4) hours as needed. APIXABAN (ELIQUIS) 5 MG TABLET    Take 2 Tabs by mouth two (2) times a day for 5 days. (10 doses).  Then 1 tab twice daily afterwards for 3 months. AZITHROMYCIN (ZITHROMAX) 250 MG TABLET    Take 1 Tab by mouth daily for 2 days. BUDESONIDE (PULMICORT) 0.5 MG/2 ML NBSP    2 mL by Nebulization route two (2) times a day. FAMOTIDINE (PEPCID) 20 MG TABLET    Take 1 Tab by mouth every twelve (12) hours. GUAIFENESIN ER (MUCINEX) 600 MG ER TABLET    Take 1 Tab by mouth two (2) times a day. OXYCODONE-ACETAMINOPHEN (PERCOCET 10)  MG PER TABLET    Take 1 Tab by mouth every four (4) hours as needed. Max Daily Amount: 6 Tabs. PREDNISONE (DELTASONE) 10 MG TABLET    Take 1 Tab by mouth daily (with breakfast) for 3 days. PREDNISONE (DELTASONE) 20 MG TABLET    Take 1 Tab by mouth daily (with breakfast) for 3 days. PREDNISONE (DELTASONE) 20 MG TABLET    Take 2 Tabs by mouth daily (with breakfast) for 3 days. These Medications have changed    No medications on file   Stop Taking    No medications on file       Scribe Attestation     Lindsey Cordoba acting as a scribe for and in the presence of Bhargavi Edwards MD      November 16, 2017 at 1:51 AM       Provider Attestation:      I personally performed the services described in the documentation, reviewed the documentation, as recorded by the scribe in my presence, and it accurately and completely records my words and actions.  November 16, 2017 at 1:51 AM - Bhargavi Edwards MD

## 2017-11-16 NOTE — ED TRIAGE NOTES
Patient arrives to triage reporting , \"I just don't know to do\" patient states he's lost his job, his wife and \"im dying\"  Patient reports he has blood clots in his lungs.

## 2017-11-16 NOTE — IP AVS SNAPSHOT
[address] [place] [address] Patient: Sherif Amaro MRN: [id number] :[id number] About your hospitalization You were admitted on:  2017 You last received care in the:  [facility] 1 ADULT CHEM DEP You were discharged on:  2017 Why you were hospitalized Your primary diagnosis was:  Mdd (Major Depressive Disorder), Recurrent Severe, Without Psychosis (Hcc) Your diagnoses also included:  Abnormal Tsh Things You Need To Do (next 8 weeks) Call Kate Lee MD in 1 day(s) Phone:  [phone] Where:  [address], [address] [place] [address], [address] [place] [id number] Follow up with Where:  Pt will need to go as Walk Into to [facility] #[phone]. . At [address] [facility], [address] [place] [address] Mon thru [place]. .. 8am to 4pm.  
 
  
  
 Follow up with Where: [address] [place] [address] Street: #[phone]. . At [facility], [facility]. .. WALK IN. . MON THRU WED Gosia Varela . 7 AM- 11:30 AM... AND Friday 7 AM -11:30 AM  
MUST bring Picture ID, Proof of Residency Discharge Orders None A check kaelyn indicates which time of day the medication should be taken. My Medications STOP taking these medications   
 azithromycin 250 mg tablet Commonly known as:  Herbie Salgado TAKE these medications as instructed Instructions Each Dose to Equal  
 Morning Noon Evening Bedtime  
 albuterol 90 mcg/actuation inhaler Commonly known as:  PROVENTIL HFA, VENTOLIN HFA, PROAIR HFA Your last dose was: Your next dose is: Take 1 Puff by inhalation every four (4) hours as needed for Wheezing. 1 Puff  
    
   
   
   
  
 albuterol-ipratropium 2.5 mg-0.5 mg/3 ml Nebu Commonly known as:  Radha Medicus Your last dose was: Your next dose is:    
   
   
 3 mL by Nebulization route every four (4) hours as needed. 3 mL * apixaban 5 mg tablet Commonly known as:  Julissa Mathis Your last dose was: Your next dose is: Take 2 Tabs by mouth two (2) times a day. (10 doses). Then 1 tab twice daily afterwards for 3 months. Indications: PULMONARY THROMBOEMBOLISM PREVENTION  
 10 mg  
    
   
   
   
  
 * apixaban 5 mg tablet Commonly known as:  Julissa Mathis Your last dose was: Your next dose is: Take 1 tab (5mg) po BID for 3 months starting 11-. Indications: PULMONARY THROMBOEMBOLISM PREVENTION  
     
   
   
   
  
 budesonide 0.5 mg/2 mL Nbsp Commonly known as:  PULMICORT Your last dose was: Your next dose is:    
   
   
 2 mL by Nebulization route two (2) times a day. 500 mcg  
    
   
   
   
  
 famotidine 20 mg tablet Commonly known as:  PEPCID Your last dose was: Your next dose is: Take 1 Tab by mouth every twelve (12) hours. 20 mg FLUoxetine 40 mg capsule Commonly known as:  PROzac Start taking on:  11/21/2017 Your last dose was: Your next dose is: Take 1 Cap by mouth daily. Indications: major depressive disorder 40 mg  
    
   
   
   
  
 guaiFENesin  mg ER tablet Commonly known as:  Dean [employer] Dean Your last dose was: Your next dose is: Take 1 Tab by mouth two (2) times a day. 600 mg  
    
   
   
   
  
 oxyCODONE-acetaminophen  mg per tablet Commonly known as:  PERCOCET 10 Your last dose was: Your next dose is: Take 1 Tab by mouth every four (4) hours as needed. Max Daily Amount: 6 Tabs. 1 Tab * predniSONE 20 mg tablet Commonly known as:  Neli Chew Start taking on:  11/21/2017 Your last dose was: Your next dose is: Take 1 Tab by mouth daily (with breakfast) for 2 days. Indications: outside medication  20 mg  
    
   
   
   
 * predniSONE 10 mg tablet Commonly known as:  Marcelene Im Start taking on:  11/24/2017 Your last dose was: Your next dose is: Take 1 Tab by mouth daily (with breakfast) for 3 days. Indications: steroid taper 10 mg  
    
   
   
   
  
 traZODone 50 mg tablet Commonly known as:  Carry [place] Your last dose was: Your next dose is: Take 1 Tab by mouth nightly. Indications: insomnia associated with depression 50 mg  
    
   
   
   
  
 * Notice: This list has 4 medication(s) that are the same as other medications prescribed for you. Read the directions carefully, and ask your doctor or other care provider to review them with you. Where to Get Your Medications Information on where to get these meds will be given to you by the nurse or doctor. ! Ask your nurse or doctor about these medications  
  apixaban 5 mg tablet  
 apixaban 5 mg tablet FLUoxetine 40 mg capsule  
 predniSONE 10 mg tablet  
 predniSONE 20 mg tablet  
 traZODone 50 mg tablet Discharge Instructions BEHAVIORAL HEALTH NURSING DISCHARGE NOTE The following personal items collected during your admission are returned to you:  
Dental Appliance:   
Vision: Visual Aid: None Hearing Aid:   
Jewelry: Jewelry: Watch Clothing: Clothing: Belt, Footwear, Jacket/Coat, Pants, [employer] Other Valuables: Other Valuables: Cell Phone, [address], Money (comment), Personal electronic devices (comment), MILLARD Valuables sent to safe:   
 
 
PATIENT INSTRUCTIONS: 
 
  
 
 
The discharge information has been reviewed with the patient. The patient verbalized understanding. Patient armband removed and shredded Introducing [facility]! [facility] introduces [employer]mio patient portal. Now you can access parts of your medical record, email your doctor's office, and request medication refills online.    
 
1. In your internet browser, go to [email or web address] 2. Click on the First Time User? Click Here link in the Sign In box. You will see the New Member Sign Up page. 3. Enter your CrowdRise Access Code exactly as it appears below. You will not need to use this code after youve completed the sign-up process. If you do not sign up before the expiration date, you must request a new code. · CrowdRise Access Code: [email or web address] Expires: 2/4/2018  1:26 PM 
 
4. Enter the last four digits of your Social Security Number (xxxx) and Date of Birth (mm/dd/yyyy) as indicated and click Submit. You will be taken to the next sign-up page. 5. Create a CrowdRise ID. This will be your CrowdRise login ID and cannot be changed, so think of one that is secure and easy to remember. 6. Create a CrowdRise password. You can change your password at any time. 7. Enter your Password Reset Question and Answer. This can be used at a later time if you forget your password. 8. Enter your e-mail address. You will receive e-mail notification when new information is available in [address] 9. Click Sign Up. You can now view and download portions of your medical record. 10. Click the Download Summary menu link to download a portable copy of your medical information. If you have questions, please visit the Frequently Asked Questions section of the CrowdRise website. Remember, CrowdRise is NOT to be used for urgent needs. For medical emergencies, dial 911. Now available from your iPhone and Android! Unresulted Labs-Please follow up with your PCP about these lab tests Order Current Status [facility] AND  In process Providers Seen During Your Hospitalization Provider Specialty Primary office phone Alicia Cao MD Emergency Medicine [phone] Gaurav Hays MD Psychiatry [phone] Your Primary Care Physician (PCP) Primary Care Physician Office Phone Office Fax Sina Tai [phone] You are allergic to the following No active allergies Recent Documentation Height Weight BMI Smoking Status 1.93 m 136.1 kg 36.52 kg/m2 Former Smoker Emergency Contacts Name Discharge Info Relation Home Work Mobile Michelle Flood DISCHARGE CAREGIVER [3] Mother [14] [phone] [facility] DISCHARGE CAREGIVER [3] Spouse [3] [phone] Michelle Flood  Parent [1] [phone] Patient Belongings The following personal items are in your possession at time of discharge: 
     Visual Aid: None      Home Medications: Locked (4 bottles, 1 inhaler - to chrg/med nurse)   Jewelry: Watch  Clothing: Belt, Footwear, Jacket/Coat, Pants, Shirt    Other Valuables: Cell Phone, Cupid-LabsJoan AndradeParatek Manjeet [address] (comment), Personal electronic devices (comment), Wallet Discharge Instructions Attachments/References MOOD DISORDERS: GENERAL INFO (ENGLISH) Patient Handouts Learning About Mood Disorders What are mood disorders? Mood disorders are medical problems that affect how you feel. They can impact your moods, thoughts, and actions. Mood disorders include: · Depression. This causes you to feel sad or hopeless for much of the time. · Bipolar disorder. This causes extreme mood changes from manic episodes of very high energy to extreme lows of depression. · Seasonal affective disorder (SAD). This is a type of depression that affects you during the same season each year. Most often people experience SAD during the fall and winter months when days are shorter and there is less light. What are the symptoms? Depression You may: · Feel sad or hopeless nearly every day. · Lose interest in or not get pleasure from most daily activities. You feel this way nearly every day. · Have low energy, changes in your appetite, or changes in how well you sleep. · Have trouble concentrating. · Think about death and suicide. Keep the numbers for these national suicide hotlines: 4-150-523-TALK (3-382.350.3868) and 9-982-NNJEJZE (3-694.623.3655). If you or someone you know talks about suicide or feeling hopeless, get help right away. Bipolar disorder Symptoms depend on your mood swings. You may: · Feel very happy, energetic, or on edge. · Feel like you need very little sleep. · Feel overly self-confident. · Do impulsive things, such as spending a lot of money. · Feel sad or hopeless. · Have racing thoughts or trouble thinking and making decisions. · Lose interest in things you have enjoyed in the past. 
· Think about death and suicide. Keep the numbers for these national suicide hotlines: 2-839-717-TALK (4-203.213.7320) and 1-221-QPPSCZV (0-825.703.2501). If you or someone you know talks about suicide or feeling hopeless, get help right away. Seasonal affective disorder (SAD) Symptoms come and go at about the same time each year. For most people with SAD, symptoms come during the winter when there is less daylight. You may: · Feel sad, grumpy, anna, or anxious. · Lose interest in your usual activities. · Eat more and crave carbohydrates, such as bread and pasta. · Gain weight. · Sleep more and feel drowsy during the daytime. How are mood disorders treated? Mood disorders can be treated with medicines or counseling, or a combination of both. Medicines for depression and SAD may include antidepressants. Medicines for bipolar disorder may include: · Mood stabilizers. · Antipsychotics. · Benzodiazepines. Counseling may involve cognitive-behavioral therapy. It teaches you how to change the ways you think and behave. This can help you stop thinking bad thoughts about yourself and your life. Light therapy is the main treatment for SAD. This therapy uses a special kind of lamp.  You let the lamp shine on you at certain times, usually in the morning. This may help your symptoms during the months when there is less sunlight. Healthy lifestyle Healthy lifestyle changes may help you feel better. · Get at least 30 minutes of exercise on most days of the week. Walking is a good choice. · Eat a healthy diet. Include fruits, vegetables, lean proteins, and whole grains in your diet each day. · Keep a regular sleep schedule. Try for 8 hours of sleep a night. · Find ways to manage stress, such as relaxation exercises. · Avoid alcohol and illegal drugs. Follow-up care is a key part of your treatment and safety. Be sure to make and go to all appointments, and call your doctor if you are having problems. It's also a good idea to know your test results and keep a list of the medicines you take. Where can you learn more? Go to http://evangelina-anne-marie.info/. Enter O044 in the search box to learn more about \"Learning About Mood Disorders. \" Current as of: May 12, 2017 Content Version: 11.4 © 8600-7498 Healthwise, [employer]. Care instructions adapted under license by "[employer]" (which disclaims liability or warranty for this information). If you have questions about a medical condition or this instruction, always ask your healthcare professional. [address] any warranty or liability for your use of this information. Please provide this summary of care documentation to your next provider. Signatures-by signing, you are acknowledging that this After Visit Summary has been reviewed with you and you have received a copy. Patient Signature:  ____________________________________________________________ Date:  ____________________________________________________________  
  
Juan [employer] Police Provider Signature:  ____________________________________________________________ Date:  ____________________________________________________________

## 2017-11-16 NOTE — BSMART NOTE
OCCUPATIONAL THERAPY PROGRESS NOTE  Group Time:  3892  Attendance: The patient attended full group. .  Participation:  The patient participated with minimal elaboration in the activity. Sat with head in arms much of group with little effort at participation. Attention:. The patient needed redirection to activity at least once. Interaction:  The patient acknowledges others or responds to questions,  with no spontaneous interaction.

## 2017-11-16 NOTE — H&P
[address] [facility] [address]  Inpatient Admission Note    Date of Service:  11/16/17    Historian(s): Chandrika Cortés and chart review  Referral Source: self    Chief Complaint   Depression     History of Present Illness     Chandrika Cortés is a 32 y.o. male with a history of untreated depression who presents for inpatient psychiatric hospitalization after developing worsening depression with suicidal ideation in the context of losing his job, his wife leaving him and having recent medical illness including pulmonary embolism. The pt was seen by crisis and was noted to have a flat/blunted affect. The pt was dressed neatly but made very little eye contact. The pt stated to crisis,  \"I don't know what's going on. I feel bad. I can't think right. I'm forgetting things. I know if this keeps getting worse I will kill myself. \"  The pt reported decreased ability to function due to current mental health issues. He reported forgetfulness which caused problems with is employer. The pt is a . He endorses decreased energy and lack of motivation. Today on interview, the pt is calm and cooperative. He is depressed and shared he has been depressed for at least a year. He corroborated the aforementioned information and shared he does not recall a trigger to depression starting a year ago. He does note this is the anniversary of his biological father's death in 2015 but he did not know him well. He states he met him a year prior to his death and only spoke with him twice. The pt states a year ago he started using cocaine. He uses about 1 gram at a time but denies using is consistently. He believes his last cocaine use was 5 days ago. In regards to depression, the pt endorses decreased sleep with fatigue, anhedonia, decreased energy, decreased concentration and decreased appetite with a weight loss of 80 pounds over an unknown time period. Pt has very little support nearby.   The denies having access to weapons. Protective factors against suicide include being future thinking, wanting to work again and get better. He currently denies SI, HI and AVH. Psychiatric Review of Systems   Depression:  Endorses depressed mood with tearfulness. . See HPI. He stated, \"MELANY\" when asked about feeling hopelessness, helplessness and worthlessness. Anxiety: Endorses excessive worrying. Irritability: Endorses low threshold of frustration or anger recently. Bipolar symptoms: Uncertain of pt's answer. It is difficult to hear him as he speaks in a low volume. Abuse/Trauma/PTSD: Endorses history of verbal.  Denies physical or sexual abuse. Denies avoidant behavior related to trauma triggers, flashbacks, hypervigilance or nightmares. Psychosis: Denies AVH or delusions. Medical Review of Systems     Sleep: Decreased  Appetite: increased with steroid prescription    14 point review of systems was completed. Significant findings are found in the HPI or MSE. Psychiatric Treatment History     Self-injurious behavior/risky thoughts or behaviors (past suicidal ideation/attempt): Denies any prior history of thoughts of self-harm or suicidal actions. Violence/Risk to others (past homicidal ideation/attempt):    Denies any prior history of violence or homicidal ideation. Previous psychiatric medication trials: Denies    Previous psychiatric hospitalizations: Endorses in [facility] and at [facility].      Current therapist: Denies    Current psychiatric provider: Denies   - Attempted to seek services in the past at [facility]    Allergies    No Known Allergies    Medical History     Past Medical History:   Diagnosis Date    Asthma     Thromboembolus [facility])        History of neurological illness: Denies  History of head injuries: Endorses     Medication(s)     No current facility-administered medications on file prior to encounter. Current Outpatient Prescriptions on File Prior to Encounter   Medication Sig Dispense Refill    apixaban (ELIQUIS) 5 mg tablet Take 2 Tabs by mouth two (2) times a day for 5 days. (10 doses). Then 1 tab twice daily afterwards for 3 months. 80 Tab 0    [START ON 11/18/2017] predniSONE (DELTASONE) 20 mg tablet Take 1 Tab by mouth daily (with breakfast) for 3 days. 3 Tab 0    azithromycin (ZITHROMAX) 250 mg tablet Take 1 Tab by mouth daily for 2 days. 2 Tab 0    albuterol-ipratropium (DUO-NEB) 2.5 mg-0.5 mg/3 ml nebu 3 mL by Nebulization route every four (4) hours as needed. 100 mL 0    famotidine (PEPCID) 20 mg tablet Take 1 Tab by mouth every twelve (12) hours. 15 Tab 0    oxyCODONE-acetaminophen (PERCOCET 10)  mg per tablet Take 1 Tab by mouth every four (4) hours as needed. Max Daily Amount: 6 Tabs. 15 Tab 0    budesonide (PULMICORT) 0.5 mg/2 mL nbsp 2 mL by Nebulization route two (2) times a day. 100 mL 0    [START ON 11/21/2017] predniSONE (DELTASONE) 10 mg tablet Take 1 Tab by mouth daily (with breakfast) for 3 days. 3 Tab 0    predniSONE (DELTASONE) 20 mg tablet Take 2 Tabs by mouth daily (with breakfast) for 3 days. 6 Tab 0    albuterol (PROVENTIL HFA, VENTOLIN HFA, PROAIR HFA) 90 mcg/actuation inhaler Take 1 Puff by inhalation every four (4) hours as needed for Wheezing. 1 Inhaler 0    guaiFENesin ER (MUCINEX) 600 mg ER tablet Take 1 Tab by mouth two (2) times a day. 10 Tab 0       Substance Abuse History     Tobacco: denied  Alcohol: denied  Marijuana: denied  Cocaine: UDS + for cocaine   - uses because, \"It makes me feel better. \"   - last use was 5 days ago.    Opiate: denied  Benzodiazepine: denied  Other: denied    Consequences: none    History of detox: none    History of substance abuse treatment: none    Family History     Medical Family History  Maternal: MI, DM  Paternal: \"MELANY\"    Psychiatric Family History  Maternal: \"MELANY\"  Paternal: \"MELANY\"    Family history of suicide? NO    Social History     Living Situation: homeless since June 2017    Employment: not currently working    Education: completed 7th grade      Relationships/Children: /has 5 children    Legal: Denies but is  from his wife. Spirituality/Christianity: Taoist     Vitals/Labs      Vitals:    11/16/17 0012 11/16/17 1051 11/16/17 1111 11/16/17 1114   BP: 136/86 130/76 169/89    Pulse: 95 72 89    Resp: 21 18 19    Temp: 97.9 °F (36.6 °C) 98 °F (36.7 °C) 100 °F (37.8 °C)    SpO2: 96% 98%     Weight: 136.1 kg (300 lb)   136.1 kg (300 lb)   Height:    6' 4\" (1.93 m)       Labs: Elevated TSH  Results for orders placed or performed during the hospital encounter of 11/16/17   CBC WITH AUTOMATED DIFF   Result Value Ref Range    WBC 14.8 (H) 4.6 - 13.2 K/uL    RBC 5.62 (H) 4.70 - 5.50 M/uL    HGB 16.4 (H) 13.0 - 16.0 g/dL    HCT 45.8 36.0 - 48.0 %    MCV 81.5 74.0 - 97.0 FL    MCH 29.2 24.0 - 34.0 PG    MCHC 35.8 31.0 - 37.0 g/dL    RDW 14.8 (H) 11.6 - 14.5 %    PLATELET 006 788 - 231 K/uL    MPV 10.1 9.2 - 11.8 FL    NEUTROPHILS 59 42 - 75 %    LYMPHOCYTES 33 20 - 51 %    MONOCYTES 7 2 - 9 %    EOSINOPHILS 1 0 - 5 %    BASOPHILS 0 0 - 3 %    ABS. NEUTROPHILS 8.8 (H) 1.8 - 8.0 K/UL    ABS. LYMPHOCYTES 4.9 (H) 0.8 - 3.5 K/UL    ABS. MONOCYTES 1.0 0 - 1.0 K/UL    ABS. EOSINOPHILS 0.1 0.0 - 0.4 K/UL    ABS.  BASOPHILS 0.0 0.0 - 0.06 K/UL    DF MANUAL      PLATELET COMMENTS ADEQUATE PLATELETS      RBC COMMENTS STOMATOCYTES  1+       METABOLIC PANEL, COMPREHENSIVE   Result Value Ref Range    Sodium 139 136 - 145 mmol/L    Potassium 3.9 3.5 - 5.5 mmol/L    Chloride 104 100 - 108 mmol/L    CO2 27 21 - 32 mmol/L    Anion gap 8 3.0 - 18 mmol/L    Glucose 88 74 - 99 mg/dL    BUN 18 7.0 - 18 MG/DL    Creatinine 1.05 0.6 - 1.3 MG/DL    BUN/Creatinine ratio 17 12 - 20      GFR est AA >60 >60 ml/min/1.73m2    GFR est non-AA >60 >60 ml/min/1.73m2    Calcium 8.5 8.5 - 10.1 MG/DL    Bilirubin, total 0.5 0.2 - 1.0 MG/DL    ALT (SGPT) 35 16 - 61 U/L    AST (SGOT) 15 15 - 37 U/L    Alk. phosphatase 53 45 - 117 U/L    Protein, total 7.5 6.4 - 8.2 g/dL    Albumin 3.5 3.4 - 5.0 g/dL    Globulin 4.0 2.0 - 4.0 g/dL    A-G Ratio 0.9 0.8 - 1.7     DRUG SCREEN, URINE   Result Value Ref Range    BENZODIAZEPINES NEGATIVE  NEG      BARBITURATES NEGATIVE  NEG      THC (TH-CANNABINOL) NEGATIVE  NEG      OPIATES POSITIVE (A) NEG      PCP(PHENCYCLIDINE) NEGATIVE  NEG      COCAINE POSITIVE (A) NEG      AMPHETAMINES NEGATIVE  NEG      METHADONE NEGATIVE  NEG      HDSCOM (NOTE)    ETHYL ALCOHOL   Result Value Ref Range    ALCOHOL(ETHYL),SERUM <3 0 - 3 MG/DL   TSH 3RD GENERATION   Result Value Ref Range    TSH 8.06 (H) 0.36 - 3.74 uIU/mL   T4, FREE   Result Value Ref Range    T4, Free 1.4 0.7 - 1.5 NG/DL       Mental Status Examination     Appearance/Hygiene 32 y.o. overweight AAM   Behavior/Social Relatedness Appropriate, relates fairly well. Musculoskeletal Gait/Station: appropriate  Tone (flaccid, cogwheeling, spastic): not assessed  Psychomotor (hyperkinetic, hypokinetic): slow at times  Involuntary movements (tics, dyskinesias, akathisia, stereotypies): none   Speech   Speaks in a low volume at times. Rate, rhythm, fluency and articulation are appropriate   Mood   depressed   Affect    depressed   Thought Process Perseverative, asked this provider the same questions several times.      Questionable thought blocking    Vagueness, incoherence, circumstantiality, tangentiality, neologisms, perseveration, flight of ideas, or self-contradictory statements not present on assessment   Thought Content and Perceptual Disturbances Denies delusions, ideas of reference, overvalued ideas, ruminations, obsession, compulsions, and phobias    Denies self-injurious behavior (SIB), suicidal ideation (SI), aggressive behavior or homicidal ideation (HI)    Denies auditory and visual hallucinations   Sensorium and Cognition  AOx4  Fair attention and concentration. Memory fair, language use appropriate. Insight  fair   Judgment fair       Suicide Risk Assessment     Admission  Date/Time: 11/16/17    [x] Admission  [] Discharge     Key Factors:   Current admission precipitated by suicide attempt? []  Yes     2    [x]  No     1     Suicide Attempt History  [] Past attempts of high lethality    2 []  Past attempts of low lethality    1 [x]  No previous attempts       0   Suicidal Ideation []  Constant suicidal thoughts      2 []  Intermittent or fleeting suicidal  thoughts  1 [x]  Denies current suicidal thoughts    0   Suicide Plan   []  Has plan with actual OR potential access to planned method    2 []  Has plan without access to planned method      1 [x]  No plan            0   Plan Lethality []  Highly lethal plan (Carbon monoxide, gun, hanging, jumping)    2 []  Moderate lethality of plan          1 [x]  Low lethality of plan (biting, head banging, superficial scratching, pillow over face)  0   Safety Plan Agreement  []  Unwilling OR unable to agree due to impaired reality testing   2   []  Patient is ambivalent and/or guarded      1 [x]  Reliably agrees        0   Current Morbid Thoughts (reunion fantasies, preoccupations with death) []  Constantly     2     []  Frequently    1 [x]  Rarely    0   Elopement Risk  []  High risk     2 []  Moderate risk    1 [x]   Low risk    0   Symptoms    []  Hopeless  []  Helpless  [x]  Anhedonia   []  Guilt/shame  []  Anger/rage  [x]  Anxiety  [x]  Insomnia   [x]  Agitation   [x]  Impulsivity  [x]  5-6 symptoms present    2 []  3-4 symptoms present    1  []  0-2 symptoms present    0     Total Score: 3  --------------------------------------------------------------------------------------------------------------  Subjective Appraisal of Risk:  []  Patient replies not trustworthy: several non-verbal cues. []  Patient replies questionable: trustworthy: at least 1 non-verbal cue.   [x]  Patient replies appear trustworthy. Protective measures (select all that apply):  [x]  Successful past responses to stress  [x]  Spiritual/Buddhist beliefs  []  Capacity for reality testing  [x]  Positive therapeutic relationships  []  Social supports/connections  []  Positive coping skills  []  Frustration tolerance/optimism  []  Children or pets in the home  []  Sense of responsibility to family  [x]  Agrees to treatment plan and follow up    High Risk Diagnoses (select all that apply):  []  Depression/Bipolar Disorder  [x]  Dual Diagnosis  []  Cardiovascular Disease  []  Schizophrenia  []  Chronic Pain  []  Epilepsy  []  Cancer  []  Personality Disorder  []  HIV/AIDS  []  Multiple Sclerosis    Dangerousness Assessment (Suicide, homicide, property destruction. ..)    Risk Factors reviewed and risk assessed to be:  [] low  [] low-moderate  [x] moderate   [] moderate-high  [] high     Protection factors reviewed and risk assessed to be:  [] low  [x] low-moderate  [] moderate   [] moderate-high  [] high     Response to treatment and risk assessed to be:  [] low  [x] low-moderate  [] moderate   [] moderate-high  [] high     Support reviewed and risk assessed to be:  [] low  [] low-moderate  [x] moderate   [] moderate-high  [] high     Acceptance of Discharge and outpatient treatment reviewed and risk assessed to be:    [] low  [x] low-moderate  [] moderate   [] moderate-high  [] high   Overall risk assessed to be:  [] low  [x] low-moderate  [x] moderate   [] moderate-high  [] high       Assessment and Plan     Psychiatric Diagnoses:     MDD (major depressive disorder), recurrent severe, without psychosis ([facility] F33.2     Medical Diagnoses:   Patient Active Problem List   Diagnosis Code    Status asthmaticus, non-allergic J45.22    Pulmonary embolism ([facility] I26.99    PE (pulmonary thromboembolism) ([facility] I26.99    MDD (major depressive disorder), recurrent severe, without psychosis ([facility] F33.2    Abnormal TSH R94.6 Psychosocial and contextual factors:    1. Separation from wife   2. Unemployment    3. Occasional cocaine use   4. Biological father's death in 2015   5. Medical problems     Level of impairment/disability: Severe Zane Corporal is a 32 y.o. who is currently requiring acute stabilization after developing worsening depression in the context of the aforementioned psychosocial factors. The pt is willing to start a medication for depression and he will need a medication which he can afford. The pt is willing to start fluoxetine for depression and anxiety. Long discussion abstinence from illicit drugs at it will cause worsening mood. Also discussed abnormal TSH and endocrinology will evaluate pt on 11-. Pt denies SI, HI and AVH. 1. Admit to locked inpatient behavioral health unit. Start milieu, group, art and occupation therapy. 2. MDD, recurrent, severe without psychosis   - start fluoxetine 10mg po every day on 11-16-17 and increase to 20mg po QHS on 11- .  3. Insomnia   - start trazodone 50mg po QHS scheduled. 4. Routine labs ordered and reviewed by this provider. 5. Reviewed instructions, risks, benefits and side effects including mood derangements, GI upset and priapism. 6. Disposition: SW will assist in coordinating outpt care. 7. Tentative date of discharge: 11-.         Randi Suazo MD  Psychiatrist  [facility]

## 2017-11-16 NOTE — ED NOTES
Blood and urine specimens collected and sent to lab. Pt resting on stretcher in room with lights dimmed for comfort and warm blanket provided. Pt states he needs help.

## 2017-11-16 NOTE — ED NOTES
Report given to ROYAL Mclean. Pt has been cooperative eating peanut butter & jelly sandwiches & soda.

## 2017-11-17 ENCOUNTER — APPOINTMENT (OUTPATIENT)
Dept: ULTRASOUND IMAGING | Age: 27
DRG: 885 | End: 2017-11-17
Attending: INTERNAL MEDICINE
Payer: SELF-PAY

## 2017-11-17 PROCEDURE — 74011000250 HC RX REV CODE- 250: Performed by: PSYCHIATRY & NEUROLOGY

## 2017-11-17 PROCEDURE — 74011250637 HC RX REV CODE- 250/637: Performed by: PSYCHIATRY & NEUROLOGY

## 2017-11-17 PROCEDURE — 76536 US EXAM OF HEAD AND NECK: CPT

## 2017-11-17 PROCEDURE — 65220000003 HC RM SEMIPRIVATE PSYCH

## 2017-11-17 PROCEDURE — 74011636637 HC RX REV CODE- 636/637: Performed by: PSYCHIATRY & NEUROLOGY

## 2017-11-17 RX ORDER — OXYCODONE AND ACETAMINOPHEN 7.5; 325 MG/1; MG/1
1 TABLET ORAL
Status: DISCONTINUED | OUTPATIENT
Start: 2017-11-17 | End: 2017-11-20 | Stop reason: HOSPADM

## 2017-11-17 RX ORDER — BUDESONIDE 0.5 MG/2ML
500 INHALANT ORAL 2 TIMES DAILY
Status: DISCONTINUED | OUTPATIENT
Start: 2017-11-17 | End: 2017-11-20 | Stop reason: HOSPADM

## 2017-11-17 RX ORDER — FLUOXETINE HYDROCHLORIDE 20 MG/1
40 CAPSULE ORAL DAILY
Status: DISCONTINUED | OUTPATIENT
Start: 2017-11-18 | End: 2017-11-17

## 2017-11-17 RX ORDER — FLUOXETINE HYDROCHLORIDE 20 MG/1
40 CAPSULE ORAL DAILY
Status: DISCONTINUED | OUTPATIENT
Start: 2017-11-19 | End: 2017-11-20 | Stop reason: HOSPADM

## 2017-11-17 RX ORDER — IPRATROPIUM BROMIDE AND ALBUTEROL SULFATE 2.5; .5 MG/3ML; MG/3ML
3 SOLUTION RESPIRATORY (INHALATION)
Status: DISCONTINUED | OUTPATIENT
Start: 2017-11-17 | End: 2017-11-20 | Stop reason: HOSPADM

## 2017-11-17 RX ORDER — FLUOXETINE HYDROCHLORIDE 20 MG/1
20 CAPSULE ORAL DAILY
Status: DISCONTINUED | OUTPATIENT
Start: 2017-11-18 | End: 2017-11-17

## 2017-11-17 RX ADMIN — PREDNISONE 40 MG: 10 TABLET ORAL at 07:39

## 2017-11-17 RX ADMIN — IBUPROFEN 400 MG: 400 TABLET ORAL at 07:32

## 2017-11-17 RX ADMIN — TRAZODONE HYDROCHLORIDE 50 MG: 50 TABLET ORAL at 20:38

## 2017-11-17 RX ADMIN — APIXABAN 10 MG: 5 TABLET, FILM COATED ORAL at 20:37

## 2017-11-17 RX ADMIN — FAMOTIDINE 20 MG: 20 TABLET ORAL at 07:39

## 2017-11-17 RX ADMIN — OXYCODONE HYDROCHLORIDE AND ACETAMINOPHEN 1 TABLET: 7.5; 325 TABLET ORAL at 20:40

## 2017-11-17 RX ADMIN — IPRATROPIUM BROMIDE AND ALBUTEROL SULFATE 3 ML: .5; 3 SOLUTION RESPIRATORY (INHALATION) at 08:27

## 2017-11-17 RX ADMIN — FLUOXETINE 20 MG: 20 CAPSULE ORAL at 08:27

## 2017-11-17 RX ADMIN — FAMOTIDINE 20 MG: 20 TABLET ORAL at 20:37

## 2017-11-17 RX ADMIN — APIXABAN 10 MG: 5 TABLET, FILM COATED ORAL at 07:39

## 2017-11-17 NOTE — BH NOTES
GROUP THERAPY PROGRESS NOTE    Giovana Beltre is participating in [place].      Group time: 15 minutes    Goal orientation: community    Group therapy participation: active    Therapeutic interventions reviewed and discussed: Unit guidelines    Impression of participation: Patient participated in group

## 2017-11-17 NOTE — BH NOTES
GROUP THERAPY PROGRESS NOTE    Shoshana Regalado is participating in [address] Group    Group time: [id number]    Personal goal for participation:  How to know When to Seek Treatment for Alcoholism      Goal orientation: active    Group therapy participation:   fully participated    Therapeutic interventions reviewed and discussed: When people talk about what is going on in their lives it allows them to release some of their pent up stress. Impression of participation:  [employer] members reviewed a film, then  Shared the importance of sharing at [facility].   Pt. received and shared while in group

## 2017-11-17 NOTE — BH NOTES
MHT Note:  The Pt was present in his room asleep in the bed for majority of the shift. The Pt was pleasant upon approach and had no behavior issues during the shift. The Pt ate his dinner and snack and took his medication with no problem. The Pt attend group and participated. The Pt stated he was still feeling depressed, but did not have a plan to hurt himself. The Pt stated \"my mind is still racing on how I can fixed all that is happening\". Staff encouraged the Pt to take this time to utilize the groups and time with the  to develop a good strategy to tackle is issues at home. Encouraged the Pt that he will be able to piece his life back together, but it may take time. Staff also encouraged the Pt he will need to be patient with himself. The Pt contracts for safety. The Pt denies any AH/VH. The Pt did not have any visitors or phone calls. The Pt was reminded to keep socks and/or shoes on his feet at all time to avoid slips and/or falls.

## 2017-11-17 NOTE — BH NOTES
Patient has participated in all unit activities this shift. He has been social with peers and cooperative with staff. Patients states he remains depressed 6 on a scale of 1/10 but has not had any thoughts of self harm this shift. He believes being in groups and talking with peers, knowing others have some of the same issues as himself, has helped with his overall feeling of hopelessness. He is hopeful the medication will be effective to decrease his symptoms of depression. Patient was encouraged to continue to participate and seek staff if thoughts of self harm arise. Will continue to monitor for safety and location. Will provide support as needed.

## 2017-11-17 NOTE — BSMART NOTE
OCCUPATIONAL THERAPY PROGRESS NOTE  Group Time:  8951  Attendance: The patient attended full group. Participation:  The patient participated with moderate elaboration in the activity. Attention:  The patient needed redirection to activity at least once. Interaction:  The patient occasionally  interacts with others. Very superficial and seemed to have difficulty with intent of topic. Off subject and distractible. Somewhat concrete. Sophia Gurrola he needs help with being homeless. No mention of substance use/abuse.

## 2017-11-17 NOTE — PROGRESS NOTES
Pt examined. Chart reviewed. Report dictated. Assessment: it is uncertain whether he is hypothyroid or not. Several repeat TSH levels will be useful to see if his value reverts to the normal range. TPO antibodies and a thyroid ultrasound would also be useful. Treatment: No thyroid replacement is recommended yet, pending results of tests.

## 2017-11-17 NOTE — BSMART NOTE
SOCIAL WORK GROUP THERAPY PROGRESS NOTE    Group Time:  11am    Group Topic:  Coping Skills        Group Participation:      Pt moderately involved during group discussion but remained attentive, listening & nodding with approval on several points. Looked at the \"Process of setting Goals\", the value of a \"daily\" /  \"weekly\" schedule as tool to build self esteem, sharpen decision making skills and help define one's reality. Discussion included the process of making \"Change\" by answering questions on handout with an emphasis on strengths & weaknesses to support improving one's self esteem.

## 2017-11-17 NOTE — CONSULTS
[address]    Name:  Mireya Agustin  MR#:  [id number]  :  1990  Account #:  [de-identified]  Date of Adm:  2017  Date of Consultation:  2017      HISTORY OF PRESENT ILLNESS: This 80-year-old man was  admitted to Psychiatry for depression. An abnormal thyroid function  test was found, and consultation was made for assistance in its  management. The patient says that he was admitted about 10 days ago for bronchial  asthma, from which he suffered most of his life. Two days after  discharge, he experienced pain in his left leg. He returned to the ER  where a thromboembolism was diagnosed. A CTA was performed  which showed the presence of a pulmonary embolus. He was placed  on Eliquis for this, but exhibited signs of depression on the pratt and  was transferred to psychiatry. Here a TSH test was performed which  was elevated. PAST MEDICAL HISTORY: He has no past history of thyroid disease,  diabetes, or other endocrine disorder. His only major medical problem  has been bronchial asthma. SOCIAL HISTORY: He is  and has 5 children. He says his wife  left him in  of this year, taking their 5 children with her to live with  her parents. 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 He said he never failed a drug test for his driving  privileges, but nonetheless does not have work. He says he is currently  homeless. His wife lives in [place]. He is assisting in [place]. 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 His playing weight was  240 pounds and playing a middle line back position. Subsequent to  that, he gradually gained weight, gaining up to 400 pounds, but  recently has lost down to about 320. PHYSICAL EXAMINATION  GENERAL APPEARANCE: The patient is an obese [facility] [employer]  man, oriented and alert with a dejected appearance.   VITAL SIGNS: Show temperature 98, pulse 76 regular, respiratory rate  18, weight is 136 kilograms. Height 6 and 4 inches. HEENT: Pupils are 4 mm, equal and reactive. Mouth and throat  normal.  NECK: There is no acanthosis. Thyroid is approximately 40 grams in  size, no nodules are felt. CHEST: There are a few violaceous striae. Breath sounds are clear. HEART: Sounds normal.  ABDOMEN: Soft. EXTREMITIES: No swelling, edema, or other lesions. NEUROLOGIC: Achilles tendon reflexes normal.    LABORATORY DATA: Free T4 of 1.4, TSH 8.06. Urine drug screen is  positive for opiates and cocaine. BUN is 18, creatinine 1.05, calcium  8.5, albumin 3.5, AST 15, ALT 35, alkaline phosphatase 53. ASSESSMENT: Elevated TSH. DISCUSSION: It is uncertain whether the patient has hypothyroidism  or not. Recovery from some illnesses can be associated with an  increased TSH. I would be useful to see whether he has a high level of  antithyroid peroxidase antibodies, as this would indicate the presence  of autoimmune thyroiditis. An ultrasound of the thyroid would also be  useful. Repeat TSH levels over the next several days will be a good  indicator whether his elevation is transient or not. I think it best to  withhold any thyroid hormone treatment for the moment pending  results of those tests. PLAN:  A. Laboratory studies, antithyroid peroxidase antibodies, and thyroid  ultrasound. B. Treatment withheld pending results of tests. Thank you for consulting us in his care.         MD VONDA Muniz / DARY  D:  11/17/2017   09:56  T:  11/17/2017   12:40  Job #:  [id number]

## 2017-11-17 NOTE — PROGRESS NOTES
[address]  Inpatient Progress Note     Date of Service: 11/17/17  Hospital Day: 1     Subjective/Interval History   11/17/17    Treatment Team Notes:  Notes reviewed and/or discussed and report that Bhavna Cavanaugh is behaviorally appropriate. Art therapy group noted the pt's imagery lacked detail and pt appeared to lack motivation. Pt endorses racing thoughts. Patient interview: Bhavna Cavanaugh was interviewed by this writer today. somewhat irritable today but easily redirected. Asking for home inhalers which were reordered. Sleep is fair. Appetite is stable. Pt asked to decrease pain medications due to feeling slower. Pt experiencing increased anxiety feels it is due to chest tightness related to SOB. Pt is medication compliant and denies medication side effects including TD, EPS, akathisia and mood derangements. No complaints. Denies SI, HI and AVH. Objective     Vitals:    11/16/17 0012 11/16/17 1051 11/16/17 1111 11/16/17 1114   BP: 136/86 130/76 169/89    Pulse: 95 72 89    Resp: 21 18 19    Temp: 97.9 °F (36.6 °C) 98 °F (36.7 °C) 100 °F (37.8 °C)    SpO2: 96% 98%     Weight: 136.1 kg (300 lb)   136.1 kg (300 lb)   Height:    6' 4\" (1.93 m)       Mental Status Examination     Appearance/Hygiene 32 y.o. overweight AAM.  Eyes are red. Behavior/Social Relatedness Appropriate, relates fairly well. Musculoskeletal Gait/Station: appropriate  Tone (flaccid, cogwheeling, spastic): not assessed  Psychomotor (hyperkinetic, hypokinetic): slower at times  Involuntary movements (tics, dyskinesias, akathisia, stereotypies): none   Speech                          Speaks in a low volume.    Rate, rhythm, fluency and articulation are appropriate   Mood                          anxious   Affect                                                   Anxious and depressed   Thought Process Perseverative, asked this provider the same questions several times.      No thought blocking identified.       Vagueness, incoherence, circumstantiality, tangentiality, neologisms, perseveration, flight of ideas, or self-contradictory statements not present on assessment   Thought Content and Perceptual Disturbances Denies delusions, ideas of reference, overvalued ideas, ruminations, obsession, compulsions, and phobias     Denies self-injurious behavior (SIB), suicidal ideation (SI), aggressive behavior or homicidal ideation (HI)     Denies auditory and visual hallucinations   Sensorium and Cognition              AOx4  Fair attention and concentration.       Memory fair, language use appropriate. Insight              fair   Judgment fair         Assessment/Plan      Psychiatric Diagnoses:      MDD (major depressive disorder), recurrent severe, without psychosis ([facility] F33.2      Medical Diagnoses:        Patient Active Problem List   Diagnosis Code    Status asthmaticus, non-allergic J45.22    Pulmonary embolism (HCC) I26.99    PE (pulmonary thromboembolism) (HCC) I26.99    MDD (major depressive disorder), recurrent severe, without psychosis ([facility] [address] F33.2    Abnormal TSH R94.6      Psychosocial and contextual factors:                         1.  Separation from wife                        2.  Unemployment                                   3.  Occasional cocaine use                        4.  Biological father's death in 2015                        5.  Medical problems      Level of impairment/disability: Moderate to Severe    Henry Saba is a 32 y.o. who is currently experiencing increased anxiety and is requesting home inhalers. Pt denies any other changes in mood. He notes he slept well and appears somewhat brighter than he did previously. Pt willing to increase fluoxetine throughout the weekend. No complaints. Denies SI, HI and AVH. 1.  MDD, recurrent, severe without psychosis   - increase fluoxetine to 30mg po Qam x1 dose on 11- and then to 40mg po Qam on 11-.    2. Decrease Percocet at pt request to 7.5mg/325mg po Q4H/prn pain. 3.  Following recs from Endocrinology due to low TSH. 4. Reviewed instructions, risks, benefits and side effects of medications  5.   Disposition/Discharge Date: home/11-    Cayden Cook MD DR. [facility]  Psychiatry

## 2017-11-18 LAB — TSH SERPL DL<=0.05 MIU/L-ACNC: 1.91 UIU/ML (ref 0.36–3.74)

## 2017-11-18 PROCEDURE — 74011000250 HC RX REV CODE- 250: Performed by: PSYCHIATRY & NEUROLOGY

## 2017-11-18 PROCEDURE — 84305 ASSAY OF SOMATOMEDIN: CPT | Performed by: INTERNAL MEDICINE

## 2017-11-18 PROCEDURE — 36415 COLL VENOUS BLD VENIPUNCTURE: CPT | Performed by: INTERNAL MEDICINE

## 2017-11-18 PROCEDURE — 83003 ASSAY GROWTH HORMONE (HGH): CPT | Performed by: INTERNAL MEDICINE

## 2017-11-18 PROCEDURE — 84403 ASSAY OF TOTAL TESTOSTERONE: CPT | Performed by: INTERNAL MEDICINE

## 2017-11-18 PROCEDURE — 84443 ASSAY THYROID STIM HORMONE: CPT | Performed by: INTERNAL MEDICINE

## 2017-11-18 PROCEDURE — 74011250637 HC RX REV CODE- 250/637: Performed by: PSYCHIATRY & NEUROLOGY

## 2017-11-18 PROCEDURE — 65220000003 HC RM SEMIPRIVATE PSYCH

## 2017-11-18 PROCEDURE — 83001 ASSAY OF GONADOTROPIN (FSH): CPT | Performed by: INTERNAL MEDICINE

## 2017-11-18 PROCEDURE — 86376 MICROSOMAL ANTIBODY EACH: CPT | Performed by: INTERNAL MEDICINE

## 2017-11-18 PROCEDURE — 74011636637 HC RX REV CODE- 636/637: Performed by: PSYCHIATRY & NEUROLOGY

## 2017-11-18 RX ADMIN — APIXABAN 5 MG: 5 TABLET, FILM COATED ORAL at 20:31

## 2017-11-18 RX ADMIN — FAMOTIDINE 20 MG: 20 TABLET ORAL at 20:31

## 2017-11-18 RX ADMIN — IBUPROFEN 400 MG: 400 TABLET ORAL at 08:05

## 2017-11-18 RX ADMIN — FAMOTIDINE 20 MG: 20 TABLET ORAL at 08:01

## 2017-11-18 RX ADMIN — PREDNISONE 20 MG: 10 TABLET ORAL at 08:01

## 2017-11-18 RX ADMIN — BUDESONIDE 500 MCG: 0.5 INHALANT RESPIRATORY (INHALATION) at 20:31

## 2017-11-18 RX ADMIN — TRAZODONE HYDROCHLORIDE 50 MG: 50 TABLET ORAL at 20:31

## 2017-11-18 RX ADMIN — FLUOXETINE 30 MG: 20 CAPSULE ORAL at 08:02

## 2017-11-18 RX ADMIN — APIXABAN 5 MG: 5 TABLET, FILM COATED ORAL at 08:01

## 2017-11-18 NOTE — BH NOTES
Marjorie Morrison has participated in all unit activities this shift. He has been more social with peers and continues to be cooperative with staff. He requested to go outside and play basketball but it was too close to visitation time and he became insistent stating he needed some fresh air and he believed that staff did not want to go outside. Shortly afterward patient was observed in his room with the door wide open standing in front of the windows facing the open door masturbating. Staff confronted patient that this behavior was inappropriate in front of an open door and he acted as if he was confused at first about the confrontation like he wasn't sure what he was doing wrong. He then stopped and we walked away and shut the door. Patient shortly came out of his room and sat down in the day area. Staff will continue to monitor for safety and location.

## 2017-11-18 NOTE — BH NOTES
Psychiatry progress note    Chart reviewed, patient interviewed. Patient stated he was hospitalized because he needed some help and felt down. Says today he feels good and much better. Reports his sleep is improved. Patient stated what is helping him is \"being here\", going to groups and his meds. Discussed recent stresses: homelessness, unemployment, wanting to spend more time with his kids. Complained he wants to talk with SW about finding housing. 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 Endocrine consult reviewed. Thyroid antibody lab and ultrasound were ordered by consultant. On exam, depressed. No SI, no HI no AVH. Insight and judgment fair. Meds - Prozac 40 qDay, Trazodone 50 qHS. Assessment - MDD, recurrent without psychosis. Stabilizing. Plan is to continue current treatment plan.

## 2017-11-18 NOTE — BH NOTES
Moisesvince Cao is participating in Leisure Activity Group. Group time: [id number]    Personal goal for participation:  Decrease Anxiety    Goal orientation: social    Group therapy participation: active    Therapeutic interventions reviewed and discussed:  Staff encouraged to  choos relaxation activities to decrease anxiety while interacting with peers    Impression of participation:  Pt. chose to , play games with peers, color eddy patterns or watch a movie.

## 2017-11-18 NOTE — PROGRESS NOTES
Problem: Depressed Mood (Adult/Pediatric)  Goal: *STG: Attends activities and groups  Pt will attend at least 3 groups daily while hospitalized. Outcome: Progressing Towards Goal  Participated in groups and activities with peers  Goal: *STG: Remains safe in hospital  Pt will remain safe daily while hospitalized. Outcome: Progressing Towards Goal  Patient contracted to safety and remained safe on the unit  Goal: *STG: Complies with medication therapy  Pt will take medications as ordered daily while hospitalized. Outcome: Progressing Towards Goal  Patient received medications as prescribed    Comments: Cooperative and pleasant, patient sat in the milieu for most part of the shift interacting and playing cards with peers. Patient contracted to safety, denies thoughts to harm self or others.  Patient did not have any visitors this shift, ate 100% dinner, received medications as prescribed and utilized non slip footwear for safety

## 2017-11-18 NOTE — BH NOTES
GROUP THERAPY PROGRESS NOTE    Alex Herrera is participating in Gratitude Group. Group time: 1 hour    Goal orientation: personal    Group therapy participation: active    Therapeutic interventions reviewed and discussed: We read and discussed several quotes and gratitude. We discussed how gratitude can be used as a useful coping tool. Patients were given a pocket-sized journal and a variety of craft supplies to begin a gratitude journal.    Impression of participation: Sae Kathie seemed to enjoy decorating his journal.  He stated he was grateful for waking up this morning.

## 2017-11-18 NOTE — BH NOTES
GROUP THERAPY PROGRESS NOTE    Vicki Valentine is participating in [employer]. Group time: 15 minutes    Goal orientation: community    Group therapy participation: active    Therapeutic interventions reviewed and discussed: Unit guidelines and daily routine were reviewed. Patients were given the opportunity to voice their questions and concerns. Impression of participation: Maddy Clark did not have any concerns at this time.

## 2017-11-19 LAB
GH SERPL-MCNC: 0.1 NG/ML (ref 0–10)
IGF-I SERPL-MCNC: 232 NG/ML (ref 98–282)
TESTOST FREE SERPL-MCNC: 13.8 PG/ML (ref 9.3–26.5)
TESTOST SERPL-MCNC: 463 NG/DL (ref 264–916)
THYROPEROXIDASE AB SERPL-ACNC: 8 IU/ML (ref 0–34)

## 2017-11-19 PROCEDURE — 74011250637 HC RX REV CODE- 250/637: Performed by: PSYCHIATRY & NEUROLOGY

## 2017-11-19 PROCEDURE — 65220000003 HC RM SEMIPRIVATE PSYCH

## 2017-11-19 PROCEDURE — 74011000250 HC RX REV CODE- 250: Performed by: PSYCHIATRY & NEUROLOGY

## 2017-11-19 PROCEDURE — 74011636637 HC RX REV CODE- 636/637: Performed by: PSYCHIATRY & NEUROLOGY

## 2017-11-19 RX ADMIN — PREDNISONE 20 MG: 10 TABLET ORAL at 08:09

## 2017-11-19 RX ADMIN — FAMOTIDINE 20 MG: 20 TABLET ORAL at 20:13

## 2017-11-19 RX ADMIN — APIXABAN 5 MG: 5 TABLET, FILM COATED ORAL at 20:13

## 2017-11-19 RX ADMIN — IPRATROPIUM BROMIDE AND ALBUTEROL SULFATE 3 ML: .5; 3 SOLUTION RESPIRATORY (INHALATION) at 06:53

## 2017-11-19 RX ADMIN — FAMOTIDINE 20 MG: 20 TABLET ORAL at 08:09

## 2017-11-19 RX ADMIN — APIXABAN 5 MG: 5 TABLET, FILM COATED ORAL at 08:09

## 2017-11-19 RX ADMIN — FLUOXETINE 40 MG: 20 CAPSULE ORAL at 08:10

## 2017-11-19 RX ADMIN — TRAZODONE HYDROCHLORIDE 50 MG: 50 TABLET ORAL at 20:14

## 2017-11-19 RX ADMIN — BUDESONIDE 500 MCG: 0.5 INHALANT RESPIRATORY (INHALATION) at 20:15

## 2017-11-19 NOTE — PROGRESS NOTES
Problem: Depressed Mood (Adult/Pediatric)  Goal: *STG: Participates in treatment plan  Pt will participate in his treatment daily while hospitalized. Outcome: Progressing Towards Goal  Pt will participate in treatment plan daily during this admission. Goal: *STG: Attends activities and groups  Pt will attend at least 3 groups daily while hospitalized. Outcome: Progressing Towards Goal  Pt will attend 2-3 activities/group daily during this admission. Problem: Suicide/Homicide (Adult/Pediatric)  Goal: *STG: Remains safe in hospital  Outcome: Progressing Towards Goal  Pt will remain safe in hospital daily during this admission. Goal: *STG: Seeks staff when feelings of self harm or harm towards others arise  Outcome: Progressing Towards Goal  Pt will seek staff when feelings of self harm or harm towards others arise daily during this admission. Goal: *STG/LTG: Complies with medication therapy  Outcome: Progressing Towards Goal  Pt will comply with medication therapy daily during this admission. Comments: Patient has been in milieu interacting with select peers. He has been watching television and playing cards. He denies suicidal/homicidal ideations. He stated he came to the hospital because \"I was stressed out\". This writer asked patient how does he feel. Patient stated \"I feel better now\". He says he is ready to get back to work. He ate dinner, snack and medication compliant. Nursing will continue to provide a safe and therapeutic environment.

## 2017-11-19 NOTE — BH NOTES
GROUP THERAPY PROGRESS NOTE    Sherif Amaro is participating in Recreational Therapy. Group time: 1.5 hour    Personal goal for participation: socialization    Goal orientation: social    Group therapy participation: active    Therapeutic interventions reviewed and discussed:     Impression of participation: Was compliant with interaction with peers while downstairs playing games and out for fresh air.

## 2017-11-19 NOTE — PROGRESS NOTES
Problem: Falls - Risk of  Goal: *Absence of Falls  Document Chavez Fall Risk and appropriate interventions in the flowsheet. Outcome: Progressing Towards Goal  Fall Risk Interventions:  No falls reported/observed      Problem: Depressed Mood (Adult/Pediatric)  Goal: *STG: Remains safe in hospital  Pt will remain safe daily while hospitalized. Outcome: Progressing Towards Goal  No unsafe behaviors  Goal: *STG: Complies with medication therapy  Pt will take medications as ordered daily while hospitalized. Outcome: Progressing Towards Goal  Compliant with medications    Comments: Patient ate breakfast and lunch. He was compliant with medications. He appeared to be focused on one female peer during the morning. He went to his room in the afternoon. He is calm, cooperative, and compliant with the unit rules and protocols. Will remain on suicide precautions. Staff will continue to monitor q15 minutes for safety. Staff and nurses will continue to provide a safe and supportive environment.

## 2017-11-19 NOTE — PROGRESS NOTES
Results reviewed. Repeat TSH is normal at 1.9. Thyroid ultrasound suggests enlargement of the right lobe, but without nodules. He would appear to have a simple goiter, but normal thyroid function. No specific treatment is necessary at the present time. Several tests remain to be reported. These will be evaluated when available.

## 2017-11-19 NOTE — BH NOTES
Psychiatry progress note     Chart reviewed, patient interviewed. Reviewed note from endocrinology documenting pending tests. Patient says he is feeling good and better. Says he is getting himself together. Patient reports primary focus is finding housing and employment. Patient complained this is challenging because he has no support system and is \"just me\". Satisfied with current meds.     On exam, euthymic. No SI, no HI no AVH. Insight and judgment fair.     Meds - Prozac 40 qDay, Trazodone 50 qHS.     Assessment - MDD, recurrent without psychosis. Stabilizing. Plan is to continue current treatment plan.

## 2017-11-20 VITALS
HEIGHT: 76 IN | DIASTOLIC BLOOD PRESSURE: 86 MMHG | RESPIRATION RATE: 18 BRPM | BODY MASS INDEX: 36.53 KG/M2 | OXYGEN SATURATION: 98 % | HEART RATE: 100 BPM | SYSTOLIC BLOOD PRESSURE: 141 MMHG | TEMPERATURE: 97.1 F | WEIGHT: 300 LBS

## 2017-11-20 PROCEDURE — 74011636637 HC RX REV CODE- 636/637: Performed by: PSYCHIATRY & NEUROLOGY

## 2017-11-20 PROCEDURE — 74011000250 HC RX REV CODE- 250: Performed by: PSYCHIATRY & NEUROLOGY

## 2017-11-20 PROCEDURE — 74011250637 HC RX REV CODE- 250/637: Performed by: PSYCHIATRY & NEUROLOGY

## 2017-11-20 RX ORDER — TRAZODONE HYDROCHLORIDE 50 MG/1
50 TABLET ORAL
Qty: 30 TAB | Refills: 0 | Status: SHIPPED | OUTPATIENT
Start: 2017-11-20 | End: 2017-12-07 | Stop reason: SDUPTHER

## 2017-11-20 RX ORDER — PREDNISONE 10 MG/1
10 TABLET ORAL
Qty: 3 TAB | Refills: 0 | Status: SHIPPED | OUTPATIENT
Start: 2017-11-24 | End: 2017-11-27

## 2017-11-20 RX ORDER — FLUOXETINE HYDROCHLORIDE 40 MG/1
40 CAPSULE ORAL DAILY
Qty: 30 CAP | Refills: 0 | Status: SHIPPED | OUTPATIENT
Start: 2017-11-21 | End: 2017-12-07 | Stop reason: SDUPTHER

## 2017-11-20 RX ORDER — PREDNISONE 20 MG/1
20 TABLET ORAL
Qty: 3 TAB | Refills: 0 | Status: SHIPPED | OUTPATIENT
Start: 2017-11-21 | End: 2017-11-23

## 2017-11-20 RX ADMIN — APIXABAN 5 MG: 5 TABLET, FILM COATED ORAL at 08:12

## 2017-11-20 RX ADMIN — FLUOXETINE 40 MG: 20 CAPSULE ORAL at 08:14

## 2017-11-20 RX ADMIN — PREDNISONE 20 MG: 10 TABLET ORAL at 08:12

## 2017-11-20 RX ADMIN — FAMOTIDINE 20 MG: 20 TABLET ORAL at 08:12

## 2017-11-20 RX ADMIN — BUDESONIDE 500 MCG: 0.5 INHALANT RESPIRATORY (INHALATION) at 08:12

## 2017-11-20 NOTE — PROGRESS NOTES
Patient received discharge orders. Patient states he did not want to wait for his prescriptions to be filled on the unit. Staff walked pt. over to pharmacy to wait for his medications. All other items given to patient including his medications he came in with.

## 2017-11-20 NOTE — BH NOTES
GROUP THERAPY PROGRESS NOTE    Katiana Beckwith is participating in [place]. Group time: 30 minutes    Personal goal for participation: to focus on own issues and resolutions to issues. Goal orientation: personal    Group therapy participation: minimal    Therapeutic interventions reviewed and discussed: Take meds and keep follow up     Impression of participation: listened as unit guidelines were being discussed and to encourage   follow up when discharged.

## 2017-11-20 NOTE — PROGRESS NOTES
[address]  Inpatient Progress Note     Date of Service: 11/20/17  Hospital Day: 4     Subjective/Interval History   11/20/17    Treatment Team Notes:  Notes reviewed and/or discussed and report that Shoshana Regalado is behaviorally appropriate. Mood improved. Medication compliant and ate 100% of meals. Patient interview: Shoshana Regalado was interviewed by this writer today. Vital signs reviewed and stable. Calm and cooperative. Laughing and joking with peers on the unit. States he is sleeping well. He feels medications are working well and fluoxetine has increased daytime energy level. Encouraged abstinence from illicit drugs. Pt states he feels prepared for discharge. Pt states he will stay with one of his friends after discharge. Pt is medication compliant and denies medication side effects including TD, EPS, akathisia and mood derangements. No complaints. Denies SI, HI and AVH. Objective     Vitals:    11/16/17 1114 11/17/17 0901 11/18/17 0809 11/19/17 0824   BP:  126/81 126/81 125/75   Pulse:  83 100 100   Resp:  16 20 20   Temp:  97.2 °F (36.2 °C) 97 °F (36.1 °C) 98 °F (36.7 °C)   SpO2:       Weight: 136.1 kg (300 lb)      Height: 6' 4\" (1.93 m)          Mental Status Examination     Appearance/Hygiene 32 y.o. overweight AAM.     Behavior/Social Relatedness Appropriate, relates well. Musculoskeletal Gait/Station: appropriate  Tone (flaccid, cogwheeling, spastic): not assessed  Psychomotor (hyperkinetic, hypokinetic): appropriate  Involuntary movements (tics, dyskinesias, akathisia, stereotypies): none   Speech                          Rate, rhythm, fluency, volume and articulation are appropriate   Mood                          \"I'm better. \"    Affect                                                   Full range and reactive.     Thought Process Linear and goal directed .      Vagueness, perseveration, incoherence, circumstantiality, tangentiality, neologisms, perseveration, flight of ideas, or self-contradictory statements not present on assessment   Thought Content and Perceptual Disturbances Denies delusions, ideas of reference, overvalued ideas, ruminations, obsession, compulsions, and phobias     Denies self-injurious behavior (SIB), suicidal ideation (SI), aggressive behavior or homicidal ideation (HI)     Denies auditory and visual hallucinations   Sensorium and Cognition              AOx4  Fair attention and concentration.       Memory fair, language use appropriate. Insight              fair   Judgment fair      Labs:  Results for orders placed or performed during the hospital encounter of 11/16/17   CBC WITH AUTOMATED DIFF   Result Value Ref Range    WBC 14.8 (H) 4.6 - 13.2 K/uL    RBC 5.62 (H) 4.70 - 5.50 M/uL    HGB 16.4 (H) 13.0 - 16.0 g/dL    HCT 45.8 36.0 - 48.0 %    MCV 81.5 74.0 - 97.0 FL    MCH 29.2 24.0 - 34.0 PG    MCHC 35.8 31.0 - 37.0 g/dL    RDW 14.8 (H) 11.6 - 14.5 %    PLATELET 783 704 - 672 K/uL    MPV 10.1 9.2 - 11.8 FL    NEUTROPHILS 59 42 - 75 %    LYMPHOCYTES 33 20 - 51 %    MONOCYTES 7 2 - 9 %    EOSINOPHILS 1 0 - 5 %    BASOPHILS 0 0 - 3 %    ABS. NEUTROPHILS 8.8 (H) 1.8 - 8.0 K/UL    ABS. LYMPHOCYTES 4.9 (H) 0.8 - 3.5 K/UL    ABS. MONOCYTES 1.0 0 - 1.0 K/UL    ABS. EOSINOPHILS 0.1 0.0 - 0.4 K/UL    ABS.  BASOPHILS 0.0 0.0 - 0.06 K/UL    DF MANUAL      PLATELET COMMENTS ADEQUATE PLATELETS      RBC COMMENTS STOMATOCYTES  1+       METABOLIC PANEL, COMPREHENSIVE   Result Value Ref Range    Sodium 139 136 - 145 mmol/L    Potassium 3.9 3.5 - 5.5 mmol/L    Chloride 104 100 - 108 mmol/L    CO2 27 21 - 32 mmol/L    Anion gap 8 3.0 - 18 mmol/L    Glucose 88 74 - 99 mg/dL    BUN 18 7.0 - 18 MG/DL    Creatinine 1.05 0.6 - 1.3 MG/DL    BUN/Creatinine ratio 17 12 - 20      GFR est AA >60 >60 ml/min/1.73m2    GFR est non-AA >60 >60 ml/min/1.73m2    Calcium 8.5 8.5 - 10.1 MG/DL    Bilirubin, total 0.5 0.2 - 1.0 MG/DL    ALT (SGPT) 35 16 - 61 U/L    AST (SGOT) 15 15 - 37 U/L    Alk.  phosphatase 53 45 - 117 U/L    Protein, total 7.5 6.4 - 8.2 g/dL    Albumin 3.5 3.4 - 5.0 g/dL    Globulin 4.0 2.0 - 4.0 g/dL    A-G Ratio 0.9 0.8 - 1.7     DRUG SCREEN, URINE   Result Value Ref Range    BENZODIAZEPINES NEGATIVE  NEG      BARBITURATES NEGATIVE  NEG      THC (TH-CANNABINOL) NEGATIVE  NEG      OPIATES POSITIVE (A) NEG      PCP(PHENCYCLIDINE) NEGATIVE  NEG      COCAINE POSITIVE (A) NEG      AMPHETAMINES NEGATIVE  NEG      METHADONE NEGATIVE  NEG      HDSCOM (NOTE)    ETHYL ALCOHOL   Result Value Ref Range    ALCOHOL(ETHYL),SERUM <3 0 - 3 MG/DL   TSH 3RD GENERATION   Result Value Ref Range    TSH 8.06 (H) 0.36 - 3.74 uIU/mL   T4, FREE   Result Value Ref Range    T4, Free 1.4 0.7 - 1.5 NG/DL   THYROID PEROXIDASE (TPO) AB   Result Value Ref Range    Thyroid peroxidase Ab 8 0 - 34 IU/mL   TSH 3RD GENERATION   Result Value Ref Range    TSH 1.91 0.36 - 3.74 uIU/mL   GROWTH HORMONE   Result Value Ref Range    Growth hormone 0.1 0.0 - 10.0 ng/mL   INSULIN-LIKE GROWTH FACTOR 1   Result Value Ref Range    Insulin-Like Growth Factor I 232 98 - 282 ng/mL   TESTOSTERONE, FREE & TOTAL   Result Value Ref Range    Testosterone 463 264 - 916 ng/dL    Free testosterone (Direct) 13.8 9.3 - 26.5 pg/mL      Assessment/Plan      Psychiatric Diagnoses:      MDD (major depressive disorder), recurrent severe, without psychosis ([facility]) F33.2      Medical Diagnoses:        Patient Active Problem List   Diagnosis Code    Status asthmaticus, non-allergic J45.22    Pulmonary embolism (HCC) I26.99    PE (pulmonary thromboembolism) (HCC) I26.99    MDD (major depressive disorder), recurrent severe, without psychosis ([facility]) F33.2    Abnormal TSH R94.6      Psychosocial and contextual factors:                         1.  Separation from wife                        2.  Unemployment                                   3.  Occasional cocaine use                        4.  Biological father's death in 2015                        5.  Medical problems      Level of impairment/disability: Mild to Moderate     Willam Cao is a 32 y.o. who is currently experiencing improvement in his symptoms. Pt states medications are working well and he is without medication side effects. He feels fluoxetine has improved depression and energy levels. Pt states he will stay with a friend after discharge. Encouraged abstinence from drugs. Denies SI, HI and AVH. 1.  MDD, recurrent, severe without psychosis   - Continue fluoxetine 40mg po Qam   2. Decrease Percocet at pt request to 7.5mg/325mg po Q4H/prn pain. 3.  Endocrinology labs and studies are unremarkable. 4.  Reviewed instructions, risks, benefits and side effects of medications  5.   Disposition/Discharge Date: home/11-    Bimal Mcdowell MD  [facility]  Psychiatry

## 2017-11-20 NOTE — BH NOTES
GROUP THERAPY PROGRESS NOTE    Jewels Martinez  is participating in Leisure-Creative Group. Group time: 1 hour    Personal goal for participation: Movie Night    Goal orientation: relaxation    Group therapy participation: active    Therapeutic interventions reviewed and discussed: Patients watched an agreed upon dramatic movie with popcorn or playing card games.     Impression of participation: Patient actively participated

## 2017-11-20 NOTE — BH NOTES
GROUP THERAPY PROGRESS NOTE    Jose Sim is participating in medication education group. Group time: 10 minutes    Personal goal for participation: understanding medication provided    Goal orientation: medication education    Group therapy participation: active    Therapeutic interventions reviewed and discussed:     Impression of participation: patient verbalized understanding of medication provided.

## 2017-11-20 NOTE — DISCHARGE INSTRUCTIONS
BEHAVIORAL HEALTH NURSING DISCHARGE NOTE      The following personal items collected during your admission are returned to you:   Dental Appliance:    Vision: Visual Aid: None  Hearing Aid:    Jewelry: Jewelry: Watch  Clothing: Clothing: Belt, Footwear, Jacket/Coat, Pants, Shirt  Other Valuables: Other Valuables: Cell Phone, Skeet Levant, Money (comment), Personal electronic devices (comment), Wallet  Valuables sent to safe:        PATIENT INSTRUCTIONS:           The discharge information has been reviewed with the patient. The patient verbalized understanding.         Patient armband removed and shredded

## 2017-11-20 NOTE — BH NOTES
Patient spent the entire evening laughing, talking, playing games and watching TV with peers. Patient states he is ready to be discharged this week. Patient maintained appropriate boundaries with peers this evening and was not a behavioral issue. Patient has been medication compliant, ate 100% of meals and snacks and wore non-slip footwear throughout the shift.

## 2017-11-20 NOTE — BSMART NOTE
SW Contact:  Took time to review pt's basic coping skills & strategies to manage his depression. .. Several CBT handouts given. .. This opened discussion on pt's Dad having  during [address] x2 yrs ago. .. Apparently his dad had struggled for years with liver & kidney problems but with held a lot of this from family. .. Discussed grief process. . Pt fears he could go down same pathway. .. Wants to improve his life so he can be good dad for his x5 children. . They with their mom who hopes pt \"gets his act together so they can reconcile\". .. Housing. . Pt has recently stayed with friends but will probably stay with brother post d/c. Kallie Shields Also given [address] [employer] lists. .. Encouraged to follow outpt with [facility], [facility] as well as attend [employer]. .. Also will start applying for  jobs locally. .. Only 7th grade education. . Will try to get his GED. 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 . (see [facility] for details).

## 2017-11-22 LAB
FSH SERPL-ACNC: 1.5 MIU/ML
LH SERPL-ACNC: 2 MIU/ML

## 2017-12-01 NOTE — DISCHARGE SUMMARY
[facility]  Inpatient Psychiatry   Discharge Summary     Admit date: 11/16/2017    Discharge date and time: 11/20/2017  1:00 PM    Discharge Physician: Donna Persaud MD    DISCHARGE DIAGNOSES     Psychiatric Diagnoses:       MDD (major depressive disorder), recurrent severe, without psychosis ([facility] F33.2       Medical Diagnoses:           Patient Active Problem List   Diagnosis Code    Status asthmaticus, non-allergic J45.22    Pulmonary embolism (HCC) I26.99    PE (pulmonary thromboembolism) ([facility] I26.99    MD[facility]major depressive disorder), [facility], [facility] F33.2    Abnormal TSH R94.6       Psychosocial and contextual factors:                         6.  Separation from wife                        7. [place]                        5.  Occasional cocaine use                        1.  Biological father's death in 1                        5.  Medical problems       Jakob Menchaca presented to the inpatient unit for inpatient psychiatric hospitalization after developing worsening depression with suicidal ideation in the context of losing his job, his wife leaving him and having recent medical illness including pulmonary embolism. The pt was seen by crisis and was noted to have a flat/blunted affect. The pt was dressed neatly but made very little eye contact. The pt stated to crisis,  \"I don't know what's going on. I feel bad. I can't think right. I'm forgetting things. I know if this keeps getting worse I will kill myself. \"  The pt reported decreased ability to function due to current mental health issues. He reported forgetfulness which caused problems with is employer. The pt is a . He endorses decreased energy and lack of motivation. Pt appeared depressed and sad on admission due to various life stressors as noted above. The pt was started on fluoxetine which was increased to 40mg po daily with good results. He also used trazodone 50mg po QHS/prn for insomnia which worked well. The pt tolerated all medications without side effects with improvement of his symptoms. He was also noted to have an elevated TSH. Endocrinology was contacted and they assessed the pt. It was noted Recovery from some illnesses can be associated with an elevated TSH. Repeat TSH, lab work and studies as noted below were unremarkable. There       DISPOSITION/FOLLOW-UP     Disposition: home    Follow-up Appointments:  Pt will need to go as Walk Into to [facility] #[id number]. . At [address] [facility], [address] [place] [address] Mon thru [place]. .. 8am to [address]: #[phone]. . At [address] [facility], [address] [facility]. .. WALK IN. . MON THRU WED Dmitri Asif . 7 AM- 11:30 AM... AND Friday 7 AM -11:30 AM   MUST bring Picture ID, Proof of Residency. MEDICATION CHANGES   Outpatient medications:  No current facility-administered medications on file prior to encounter. Current Outpatient Prescriptions on File Prior to Encounter   Medication Sig Dispense Refill    famotidine (PEPCID) 20 mg tablet Take 1 Tab by mouth every twelve (12) hours. 15 Tab 0    oxyCODONE-acetaminophen (PERCOCET 10)  mg per tablet Take 1 Tab by mouth every four (4) hours as needed. Max Daily Amount: 6 Tabs. 15 Tab 0    albuterol-ipratropium (DUO-NEB) 2.5 mg-0.5 mg/3 ml nebu 3 mL by Nebulization route every four (4) hours as needed. 100 mL 0    budesonide (PULMICORT) 0.5 mg/2 mL nbsp 2 mL by Nebulization route two (2) times a day. 100 mL 0    albuterol (PROVENTIL HFA, VENTOLIN HFA, PROAIR HFA) 90 mcg/actuation inhaler Take 1 Puff by inhalation every four (4) hours as needed for Wheezing. 1 Inhaler 0    guaiFENesin ER (MUCINEX) 600 mg ER tablet Take 1 Tab by mouth two (2) times a day.  10 Tab 0         Medications discontinued during hospitalization:  Medications Discontinued During This Encounter   Medication Reason    traZODone (DESYREL) tablet 50 mg  FLUoxetine (PROzac) capsule 20 mg     FLUoxetine (PROzac) capsule 40 mg     oxyCODONE-acetaminophen (PERCOCET 10)  mg per tablet 1 Tab     FLUoxetine (PROzac) capsule 20 mg     predniSONE (DELTASONE) 10 mg tablet     predniSONE (DELTASONE) 20 mg tablet     apixaban (ELIQUIS) 5 mg tablet     azithromycin (ZITHROMAX) 250 mg tablet Discontinued at Discharge    predniSONE (DELTASONE) 20 mg tablet Discontinued at Discharge    apixaban (ELIQUIS) 5 mg tablet     oxyCODONE-acetaminophen (PERCOCET 7.5) 7.5-325 mg per tablet 1 Tab Patient Discharge    FLUoxetine (PROzac) capsule 40 mg Patient Discharge    ipratropium-albuterol (COMBIVENT RESPIMAT) 20 mcg-100 mcg inhalation spray Patient Discharge    budesonide (PULMICORT) 500 mcg/2 ml nebulizer suspension Patient Discharge    albuterol-ipratropium (DUO-NEB) 2.5 MG-0.5 MG/3 ML Patient Discharge    traZODone (DESYREL) tablet 50 mg Patient Discharge    famotidine (PEPCID) tablet 20 mg Patient Discharge    predniSONE (DELTASONE) tablet 10 mg Patient Discharge    apixaban (ELIQUIS) tablet 5 mg Patient Discharge    ibuprofen (MOTRIN) tablet 400 mg Patient Discharge    LORazepam (ATIVAN) injection 1-2 mg Patient Discharge    haloperidol lactate (HALDOL) injection 5 mg Patient Discharge    haloperidol (HALDOL) tablet 5 mg Patient Discharge    LORazepam (ATIVAN) tablet 1-2 mg Patient Discharge         Discharged medication:  Discharge Medication List as of 11/20/2017 12:10 PM      START taking these medications    Details   FLUoxetine (PROZAC) 40 mg capsule Take 1 Cap by mouth daily. Indications: major depressive disorder, Print, Disp-30 Cap, R-0      traZODone (DESYREL) 50 mg tablet Take 1 Tab by mouth nightly. Indications: insomnia associated with depression, Print, Disp-30 Tab, R-0         CONTINUE these medications which have CHANGED    Details   !! apixaban (ELIQUIS) 5 mg tablet Take 1 tab (5mg) po BID for 3 months starting 11-. Indications: PULMONARY THROMBOEMBOLISM PREVENTION, Print, Disp-60 Tab, R-0      !! predniSONE (DELTASONE) 20 mg tablet Take 1 Tab by mouth daily (with breakfast) for 2 days. Indications: outside medication, Print, Disp-3 Tab, R-0      !! predniSONE (DELTASONE) 10 mg tablet Take 1 Tab by mouth daily (with breakfast) for 3 days. Indications: steroid taper, Print, Disp-3 Tab, R-0      !! apixaban (ELIQUIS) 5 mg tablet Take 2 Tabs by mouth two (2) times a day. (10 doses). Then 1 tab twice daily afterwards for 3 months. Indications: PULMONARY THROMBOEMBOLISM PREVENTION, Print, Disp-6 Tab, R-0       !! - Potential duplicate medications found. Please discuss with provider. CONTINUE these medications which have NOT CHANGED    Details   famotidine (PEPCID) 20 mg tablet Take 1 Tab by mouth every twelve (12) hours. , Print, Disp-15 Tab, R-0      oxyCODONE-acetaminophen (PERCOCET 10)  mg per tablet Take 1 Tab by mouth every four (4) hours as needed. Max Daily Amount: 6 Tabs., Print, Disp-15 Tab, R-0      albuterol-ipratropium (DUO-NEB) 2.5 mg-0.5 mg/3 ml nebu 3 mL by Nebulization route every four (4) hours as needed. , Print, Disp-100 mL, R-0      budesonide (PULMICORT) 0.5 mg/2 mL nbsp 2 mL by Nebulization route two (2) times a day., Print, Disp-100 mL, R-0      albuterol (PROVENTIL HFA, VENTOLIN HFA, PROAIR HFA) 90 mcg/actuation inhaler Take 1 Puff by inhalation every four (4) hours as needed for Wheezing., Print, Disp-1 Inhaler, R-0      guaiFENesin ER (MUCINEX) 600 mg ER tablet Take 1 Tab by mouth two (2) times a day., Print, Disp-10 Tab, R-0         STOP taking these medications       azithromycin (ZITHROMAX) 250 mg tablet Comments:   Reason for Stopping:               Instructions, risks, benefits and side effects were discussed in detail prior to discharge.        LABS/IMAGING DURING ADMISSION     Results for orders placed or performed during the hospital encounter of 11/16/17   CBC WITH AUTOMATED DIFF   Result Value Ref Range    WBC 14.8 (H) 4.6 - 13.2 K/uL    RBC 5.62 (H) 4.70 - 5.50 M/uL    HGB 16.4 (H) 13.0 - 16.0 g/dL    HCT 45.8 36.0 - 48.0 %    MCV 81.5 74.0 - 97.0 FL    MCH 29.2 24.0 - 34.0 PG    MCHC 35.8 31.0 - 37.0 g/dL    RDW 14.8 (H) 11.6 - 14.5 %    PLATELET 326 948 - 152 K/uL    MPV 10.1 9.2 - 11.8 FL    NEUTROPHILS 59 42 - 75 %    LYMPHOCYTES 33 20 - 51 %    MONOCYTES 7 2 - 9 %    EOSINOPHILS 1 0 - 5 %    BASOPHILS 0 0 - 3 %    ABS. NEUTROPHILS 8.8 (H) 1.8 - 8.0 K/UL    ABS. LYMPHOCYTES 4.9 (H) 0.8 - 3.5 K/UL    ABS. MONOCYTES 1.0 0 - 1.0 K/UL    ABS. EOSINOPHILS 0.1 0.0 - 0.4 K/UL    ABS. BASOPHILS 0.0 0.0 - 0.06 K/UL    DF MANUAL      PLATELET COMMENTS ADEQUATE PLATELETS      RBC COMMENTS STOMATOCYTES  1+       METABOLIC PANEL, COMPREHENSIVE   Result Value Ref Range    Sodium 139 136 - 145 mmol/L    Potassium 3.9 3.5 - 5.5 mmol/L    Chloride 104 100 - 108 mmol/L    CO2 27 21 - 32 mmol/L    Anion gap 8 3.0 - 18 mmol/L    Glucose 88 74 - 99 mg/dL    BUN 18 7.0 - 18 MG/DL    Creatinine 1.05 0.6 - 1.3 MG/DL    BUN/Creatinine ratio 17 12 - 20      GFR est AA >60 >60 ml/min/1.73m2    GFR est non-AA >60 >60 ml/min/1.73m2    Calcium 8.5 8.5 - 10.1 MG/DL    Bilirubin, total 0.5 0.2 - 1.0 MG/DL    ALT (SGPT) 35 16 - 61 U/L    AST (SGOT) 15 15 - 37 U/L    Alk.  phosphatase 53 45 - 117 U/L    Protein, total 7.5 6.4 - 8.2 g/dL    Albumin 3.5 3.4 - 5.0 g/dL    Globulin 4.0 2.0 - 4.0 g/dL    A-G Ratio 0.9 0.8 - 1.7     DRUG SCREEN, URINE   Result Value Ref Range    BENZODIAZEPINES NEGATIVE  NEG      BARBITURATES NEGATIVE  NEG      THC (TH-CANNABINOL) NEGATIVE  NEG      OPIATES POSITIVE (A) NEG      PCP(PHENCYCLIDINE) NEGATIVE  NEG      COCAINE POSITIVE (A) NEG      AMPHETAMINES NEGATIVE  NEG      METHADONE NEGATIVE  NEG      HDSCOM (NOTE)    ETHYL ALCOHOL   Result Value Ref Range    ALCOHOL(ETHYL),SERUM <3 0 - 3 MG/DL   TSH 3RD GENERATION   Result Value Ref Range    TSH 8.06 (H) 0.36 - 3.74 uIU/mL   T4, FREE   Result Value Ref Range    T4, Free 1.4 0.7 - 1.5 NG/DL   THYROID PEROXIDASE (TPO) AB   Result Value Ref Range    Thyroid peroxidase Ab 8 0 - 34 IU/mL   TSH 3RD GENERATION   Result Value Ref Range    TSH 1.91 0.36 - 3.74 uIU/mL   GROWTH HORMONE   Result Value Ref Range    Growth hormone 0.1 0.0 - 10.0 ng/mL   INSULIN-LIKE GROWTH FACTOR 1   Result Value Ref Range    Insulin-Like Growth Factor I 232 98 - 282 ng/mL   TESTOSTERONE, FREE & TOTAL   Result Value Ref Range    Testosterone 463 264 - 916 ng/dL    Free testosterone (Direct) 13.8 9.3 - 26.5 pg/mL   FSH AND LH   Result Value Ref Range    FSH 1.5 mIU/mL    Luteinizing hormone 2.0 mIU/mL        DISCHARGE MENTAL STATUS EVALUATION     Appearance/Hygiene 32 y. o. overweight AAM.     Behavior/Social Relatedness Appropriate, relates well. Musculoskeletal Gait/Station: appropriate  Tone (flaccid, cogwheeling, spastic): not assessed  Psychomotor (hyperkinetic, hypokinetic): appropriate  Involuntary movements (tics, dyskinesias, akathisia, stereotypies): none   Speech                          Rate, rhythm, fluency, volume and articulation are appropriate   Mood                          \"I'm better. \"    Affect                                                   Full range and reactive. Thought Process Linear and goal directed .      Vagueness, perseveration, incoherence, circumstantiality, tangentiality, neologisms, perseveration, flight of ideas, or self-contradictory statements not present on assessment   Thought Content and Perceptual Disturbances Denies delusions, ideas of reference, overvalued ideas, ruminations, obsession, compulsions, and phobias      Denies self-injurious behavior (SIB), suicidal ideation (SI), aggressive behavior or homicidal ideation (HI)      Denies auditory and visual hallucinations   Sensorium and Cognition              AOx4  Fair attention and concentration.        Memory fair, language use appropriate.     Insight              fair   Judgment fair     Labs:    SUICIDE RISK ASSESSMENT     [] Admission  [x] Discharge     Key Factors:   Current admission precipitated by suicide attempt?   []  Yes     2    [x]  No     1     Suicide Attempt History  [] Past attempts of high lethality    2 []  Past attempts of low lethality    1 [x]  No previous attempts       0   Suicidal Ideation []  Constant suicidal thoughts      2 []  Intermittent or fleeting suicidal  thoughts  1 [x]  Denies current suicidal thoughts    0   Suicide Plan   []  Has plan with actual OR potential access to planned method    2 []  Has plan without access to planned method      1 [x]  No plan            0   Plan Lethality []  Highly lethal plan (Carbon monoxide, gun, hanging, jumping)    2 []  Moderate lethality of plan          1 [x]  Low lethality of plan (biting, head banging, superficial scratching, pillow over face)  0   Safety Plan Agreement  []  Unwilling OR unable to agree due to impaired reality testing   2   []  Patient is ambivalent and/or guarded      1 [x]  Reliably agrees        0   Current Morbid Thoughts (reunion fantasies, preoccupations with death) []  Constantly     2     []  Frequently    1 [x]  Rarely    0   Elopement Risk  []  High risk     2 []  Moderate risk    1 [x]   Low risk    0   Symptoms    []  Hopeless  []  Helpless  []  Anhedonia   []  Guilt/shame  []  Anger/rage  []  Anxiety  []  Insomnia   []  Agitation   []  Impulsivity  []  5-6 symptoms present    2 []  3-4 symptoms present    1  [x]  0-2 symptoms present    0     Scoring Key:  10 or higher = Imminent Risk (consider 1:1)  4 - 9 = Moderate Risk (consider q 15 minute observation)Attended alcohol, tobacco, prescription and other drug psychoeducation group.   0 - 3 = Low Risk (consider q 30 minute observation)    Total Score: 1  ------------------------------------------------------------------------------------------------------------------  PLEASE ADDRESS THE FOLLOWING 5 ISSUES     Physician's Subjective Appraisal of Risk (check one):  []  Patient replies not trustworthy: several non-verbal cues. []  Patient replies questionable: trustworthy: at least 1 non-verbal cue. [x]  Patient replies appear trustworthy. Family History of Suicide? []  Yes  [x]  No    Protective measures (select all that apply):  [x]  Successful past responses to stress  [x]  Spiritual/Latter-day beliefs  [x]  Capacity for reality testing  [x]  Positive therapeutic relationships  [x]  Social supports/connections  [x]  Positive coping skills  [x]  Frustration tolerance/optimism  []  Children or pets in the home  [x]  Sense of responsibility to family  [x]  Agrees to treatment plan and follow up    Others (list):    High Risk Diagnoses (select all that apply):  [x]  Depression/Bipolar Disorder  [x]  Dual Diagnosis  []  Cardiovascular Disease  []  Schizophrenia  []  Chronic Pain  []  Epilepsy  []  Cancer  []  Personality Disorder  []  HIV/AIDS  []  Multiple Sclerosis    Dangerousness Assessment (Suicide, homicide, property destruction. ..)    Risk Factors reviewed and risk assessed to be:  [] low  [] low-moderate  [] moderate   [x] moderate-high  [] high     Protection factors reviewed and risk assessed to be:  [] low  [x] low-moderate  [] moderate   [] moderate-high  [] high     Response to treatment and risk assessed to be:  [x] low  [] low-moderate  [] moderate   [] moderate-high  [] high     Support reviewed and risk assessed to be:  [x] low  [] low-moderate  [] moderate   [] moderate-high  [] high     Acceptance of Discharge and outpatient treatment reviewed and risk assessed to be:    [x] low  [] low-moderate  [] moderate   [] moderate-high  [] high   Overall risk assessed to be:  [] low  [x] low-moderate  [] moderate   [] moderate-high  [] high     Completion of discharge was greater than 30 minutes. Over 50% of today's discharge was geared towards counseling and coordination of care.           Lilli Recinos MD  Psychiatry  [facility]

## 2017-12-07 ENCOUNTER — OFFICE VISIT (OUTPATIENT)
Dept: FAMILY MEDICINE CLINIC | Age: 27
End: 2017-12-07

## 2017-12-07 VITALS
TEMPERATURE: 98 F | RESPIRATION RATE: 16 BRPM | HEIGHT: 76 IN | BODY MASS INDEX: 38.36 KG/M2 | HEART RATE: 80 BPM | OXYGEN SATURATION: 98 % | WEIGHT: 315 LBS | SYSTOLIC BLOOD PRESSURE: 134 MMHG | DIASTOLIC BLOOD PRESSURE: 86 MMHG

## 2017-12-07 DIAGNOSIS — J45.909 MILD ASTHMA WITHOUT COMPLICATION, UNSPECIFIED WHETHER PERSISTENT: ICD-10-CM

## 2017-12-07 DIAGNOSIS — F33.2 MDD (MAJOR DEPRESSIVE DISORDER), RECURRENT SEVERE, WITHOUT PSYCHOSIS (HCC): ICD-10-CM

## 2017-12-07 DIAGNOSIS — I26.99 OTHER PULMONARY EMBOLISM WITHOUT ACUTE COR PULMONALE, UNSPECIFIED CHRONICITY (HCC): Primary | ICD-10-CM

## 2017-12-07 RX ORDER — ALBUTEROL SULFATE 90 UG/1
2 AEROSOL, METERED RESPIRATORY (INHALATION)
Qty: 1 INHALER | Refills: 0 | Status: SHIPPED | COMMUNITY
Start: 2017-12-07 | End: 2018-04-23

## 2017-12-07 RX ORDER — FLUOXETINE HYDROCHLORIDE 40 MG/1
40 CAPSULE ORAL DAILY
Qty: 30 CAP | Refills: 2
Start: 2017-12-07 | End: 2018-01-09

## 2017-12-07 RX ORDER — FAMOTIDINE 20 MG/1
20 TABLET, FILM COATED ORAL EVERY 12 HOURS
Qty: 60 TAB | Refills: 2
Start: 2017-12-07 | End: 2018-04-23

## 2017-12-07 RX ORDER — TRAZODONE HYDROCHLORIDE 50 MG/1
50 TABLET ORAL
Qty: 30 TAB | Refills: 2
Start: 2017-12-07 | End: 2018-04-23

## 2017-12-07 NOTE — PATIENT INSTRUCTIONS
Call [employer] at [phone]. They will screen you for any insurance you might qualify for. This is the first step before you can receive discounts at the [facility] [place] or [facility] Insurance specialists. Additional contact numbers for [employer] are below:   For [place] patients: [phone]  For [place]/[facility] patients: [phone]  For [place]/[facility] patients: [phone]

## 2017-12-07 NOTE — PROGRESS NOTES
Chief Complaint   Patient presents with    Medication Refill     1. When and where did you last receive medical care? Yes Where: [facility]    2. When and where did you last have preventive care such as mammogram, pap smears or colon screening? 3. What is your current living situation (for example, live alone, live in home with immediate family members)? [employer]    4. Do you have any problems with communication such trouble seeing, hearing, or understanding instructions? No    5. Do you have an advance directive? This is a document that you can give to family members with instructions for how you would want them to make health care decisions for you if you were unable to speak for yourself. (For example, unconscious, delerious)No    PMH/FH/Social Hx reviewed and updated as needed     Applicable screenings reviewed and updated as needed  Medication reconciliation performed. Patient does need medication refills. Health Maintenance reviewed.

## 2017-12-07 NOTE — PROGRESS NOTES
NATASHA Olsen is a 32 y.o. male being seen today for   Chief Complaint   Patient presents with    Medication Refill   . he states that he was recently hospitalized for DVT and PE, then again for severe depression. He has been started on eliquis and prozac, both are working well. He still has leg pain. Mood is much improved. He is looking forward to spending stephen with his family. Also has dx of asthma x years. Uses albuterol prn and in the past had been on singulair and flovent as well but has not had htem since he lost insurance. Has plans to establish with [employer] [facility]. He is from [place] and lives there permanently although is staying short term at [place]. appt with [employer] [facility] is 1/18    Past Medical History:   Diagnosis Date    Abnormal TSH 11/16/2017    Asthma     MDD (major depressive disorder), recurrent severe, without psychosis ([facility] 11/16/2017    Thromboembolus ([facility]          ROS  Patient states that he is feeling well. Denies complaints of chest pain, shortness of breath, swelling of legs, dizziness or weakness. he denies nausea, vomiting or diarrhea. Current Outpatient Prescriptions   Medication Sig    FLUoxetine (PROZAC) 40 mg capsule Take 1 Cap by mouth daily. Indications: major depressive disorder    traZODone (DESYREL) 50 mg tablet Take 1 Tab by mouth nightly. Indications: insomnia associated with depression    apixaban (ELIQUIS) 5 mg tablet Take 2 Tabs by mouth two (2) times a day. Then 1 tab twice daily (2 days) afterwards for 3 months. Indications: PULMONARY THROMBOEMBOLISM PREVENTION    famotidine (PEPCID) 20 mg tablet Take 1 Tab by mouth every twelve (12) hours.  albuterol (PROVENTIL HFA, VENTOLIN HFA, PROAIR HFA) 90 mcg/actuation inhaler Take 2 Puffs by inhalation every four (4) hours as needed for Wheezing.  albuterol-ipratropium (DUO-NEB) 2.5 mg-0.5 mg/3 ml nebu 3 mL by Nebulization route every four (4) hours as needed.  budesonide (PULMICORT) 0.5 mg/2 mL nbsp 2 mL by Nebulization route two (2) times a day.  guaiFENesin ER (MUCINEX) 600 mg ER tablet Take 1 Tab by mouth two (2) times a day.  oxyCODONE-acetaminophen (PERCOCET 10)  mg per tablet Take 1 Tab by mouth every four (4) hours as needed. Max Daily Amount: 6 Tabs. No current facility-administered medications for this visit. PE  Visit Vitals    /86 (BP 1 Location: Left arm, BP Patient Position: Sitting)    Pulse 80    Temp 98 °F (36.7 °C) (Oral)    Resp 16    Ht 6' 4\" (1.93 m)    Wt 316 lb (143.3 kg)    SpO2 98%    BMI 38.46 kg/m2        Alert and oriented with normal mood and affect. he is well developed and well nourished . Lungs are clear without wheezing. Heart rate is regular without murmurs or gallops. There is no lower extremity edema. Assessment and Plan:        ICD-10-CM ICD-9-CM    1. Other pulmonary embolism without acute cor pulmonale, unspecified chronicity ([facility]  Sample eliquis  Will need follow up with pulm  Gave apa# to start financial screening I26.99 415.19    2. MDD (major depressive disorder), recurrent severe, without psychosis ([facility]    Refill prozac and trazodone F33.2 296.33    3.  Mild asthma without complication, unspecified whether persistent  Will benefit from addition of controller med when he has prescription assistance J45.909 493.90      Sample eliquis  Sample ventolin    Follow up [facility] or here if he changes his mind  He will need to apply for prescription assistance with either clinic      Kailash Wooten MD

## 2018-01-08 ENCOUNTER — DOCUMENTATION ONLY (OUTPATIENT)
Dept: FAMILY MEDICINE CLINIC | Age: 28
End: 2018-01-08

## 2018-01-08 PROCEDURE — 99283 EMERGENCY DEPT VISIT LOW MDM: CPT

## 2018-01-08 NOTE — PROGRESS NOTES
Patient is currently living in a [facility] shelter in [place], gave them the number to [facility] in [place] since we are unable to provide services here.

## 2018-01-09 ENCOUNTER — HOSPITAL ENCOUNTER (EMERGENCY)
Age: 28
Discharge: HOME | End: 2018-01-09
Attending: EMERGENCY MEDICINE
Payer: SELF-PAY

## 2018-01-09 ENCOUNTER — APPOINTMENT (OUTPATIENT)
Dept: CT IMAGING | Age: 28
End: 2018-01-09
Attending: EMERGENCY MEDICINE
Payer: SELF-PAY

## 2018-01-09 VITALS
RESPIRATION RATE: 18 BRPM | BODY MASS INDEX: 37.73 KG/M2 | SYSTOLIC BLOOD PRESSURE: 146 MMHG | DIASTOLIC BLOOD PRESSURE: 92 MMHG | OXYGEN SATURATION: 96 % | HEART RATE: 93 BPM | TEMPERATURE: 98.8 F | WEIGHT: 310 LBS

## 2018-01-09 DIAGNOSIS — G51.0 BELL'S PALSY: Primary | ICD-10-CM

## 2018-01-09 DIAGNOSIS — R51.9 NONINTRACTABLE HEADACHE, UNSPECIFIED CHRONICITY PATTERN, UNSPECIFIED HEADACHE TYPE: ICD-10-CM

## 2018-01-09 LAB
AMPHET UR QL SCN: NEGATIVE
BARBITURATES UR QL SCN: POSITIVE
BENZODIAZ UR QL: NEGATIVE
CANNABINOIDS UR QL SCN: NEGATIVE
COCAINE UR QL SCN: NEGATIVE
HDSCOM,HDSCOM: ABNORMAL
METHADONE UR QL: NEGATIVE
OPIATES UR QL: NEGATIVE
PCP UR QL: NEGATIVE

## 2018-01-09 PROCEDURE — 70450 CT HEAD/BRAIN W/O DYE: CPT

## 2018-01-09 PROCEDURE — 80307 DRUG TEST PRSMV CHEM ANLYZR: CPT | Performed by: EMERGENCY MEDICINE

## 2018-01-09 PROCEDURE — 74011250637 HC RX REV CODE- 250/637: Performed by: EMERGENCY MEDICINE

## 2018-01-09 RX ORDER — OXYCODONE AND ACETAMINOPHEN 5; 325 MG/1; MG/1
1 TABLET ORAL
Qty: 10 TAB | Refills: 0 | Status: SHIPPED | OUTPATIENT
Start: 2018-01-09 | End: 2018-04-23

## 2018-01-09 RX ORDER — ACETAMINOPHEN 500 MG
1000 TABLET ORAL
Status: DISCONTINUED | OUTPATIENT
Start: 2018-01-09 | End: 2018-01-09 | Stop reason: HOSPADM

## 2018-01-09 RX ORDER — OXYCODONE HYDROCHLORIDE 5 MG/1
5 TABLET ORAL
Status: COMPLETED | OUTPATIENT
Start: 2018-01-09 | End: 2018-01-09

## 2018-01-09 RX ORDER — METHYLPREDNISOLONE 4 MG/1
TABLET ORAL
Qty: 1 DOSE PACK | Refills: 0 | Status: SHIPPED | OUTPATIENT
Start: 2018-01-09 | End: 2018-04-23

## 2018-01-09 RX ADMIN — OXYCODONE HYDROCHLORIDE 5 MG: 5 TABLET ORAL at 13:10

## 2018-01-09 NOTE — ED PROVIDER NOTES
EMERGENCY DEPARTMENT HISTORY AND PHYSICAL EXAM    11:54 AM      Date: 1/9/2018  Patient Name: Robbin Franco    History of Presenting Illness     Chief Complaint   Patient presents with   [address] Problem         History Provided By: Patient    Chief Complaint: headache  Duration: 2 weeks  Timing:  Intermittent and Worsening  Location: throughout head  Quality: Pressure  Severity: moderate  Modifying Factors: improvement with percocet  Associated Symptoms: he notes right side of face \"feels heavy\"    Additional History (Context): Robbin Franco is a 32 y.o. male with asthma and depression, thromboembolus  who presents with worsening intermittent headaches that began about 2 weeks ago. He states that headaches have been increasing in severity and frequency and notes pressure throughout his head. He also notes a feeling of \"heaviness\" in the right side of his face and states that friends have noticed his left eyebrow has been higher than the right over the past week. He denies tingling or numbness. He notes that he has been seen and discharged at multiple EDs for similar complaints, but is unable to describe his diagnoses. He states that he switched depression medication about 1 month ago. He notes current dysphoric mood, but denies SI or HI. No other symptoms or concerns were expressed. PCP: Edinson Angel MD    Current Facility-Administered Medications   Medication Dose Route Frequency Provider Last Rate Last Dose    acetaminophen (TYLENOL) tablet 1,000 mg  1,000 mg Oral NOW Gabrielle Michele MD        oxyCODONE IR (ROXICODONE) tablet 5 mg  5 mg Oral NOW Gabrielle Michele MD         Current Outpatient Prescriptions   Medication Sig Dispense Refill    oxyCODONE-acetaminophen (PERCOCET) 5-325 mg per tablet Take 1 Tab by mouth every four (4) hours as needed for Pain. Max Daily Amount: 6 Tabs.  10 Tab 0    methylPREDNISolone (MEDROL, DEBBIE,) 4 mg tablet Per dose pack instructions 1 Dose Pack 0    traZODone (DESYREL) 50 mg tablet Take 1 Tab by mouth nightly. Indications: insomnia associated with depression 30 Tab 2    apixaban (ELIQUIS) 5 mg tablet Take 2 Tabs by mouth two (2) times a day. Then 1 tab twice daily (2 days) afterwards for 3 months. Indications: PULMONARY THROMBOEMBOLISM PREVENTION 120 Tab 0    famotidine (PEPCID) 20 mg tablet Take 1 Tab by mouth every twelve (12) hours. 60 Tab 2    albuterol (PROVENTIL HFA, VENTOLIN HFA, PROAIR HFA) 90 mcg/actuation inhaler Take 2 Puffs by inhalation every four (4) hours as needed for Wheezing. 1 Inhaler 0    albuterol-ipratropium (DUO-NEB) 2.5 mg-0.5 mg/3 ml nebu 3 mL by Nebulization route every four (4) hours as needed. 100 mL 0    budesonide (PULMICORT) 0.5 mg/2 mL nbsp 2 mL by Nebulization route two (2) times a day. 100 mL 0    guaiFENesin ER (MUCINEX) 600 mg ER tablet Take 1 Tab by mouth two (2) times a day. 10 Tab 0       Past History     Past Medical History:  Past Medical History:   Diagnosis Date    Abnormal TSH 11/16/2017    Asthma     MDD (major depressive disorder), recurrent severe, without psychosis ([facility] [address] 11/16/2017    Thromboembolus ([facility] [address]        Past Surgical History:  No past surgical history on file. Family History:  Family History   Problem Relation Age of Onset    No Known Problems Mother     Liver Disease Father        Social History:  Social History   Substance Use Topics    Smoking status: Former Smoker     Types: Cigarettes    Smokeless tobacco: Never Used    Alcohol use No       Allergies:  No Known Allergies      Review of Systems       Review of Systems   Constitutional: Negative for chills and fever. HENT: Positive for congestion. Negative for rhinorrhea. Respiratory: Negative for shortness of breath. Cardiovascular: Negative for chest pain. Gastrointestinal: Positive for abdominal pain (upper, discomfort). Negative for nausea and vomiting. Endocrine: Negative for polyuria. Genitourinary: Negative for dysuria. Musculoskeletal: Negative for back pain. Skin: Negative for rash. Neurological: Positive for headaches. Psychiatric/Behavioral: Positive for dysphoric mood. Negative for suicidal ideas. Physical Exam   No data found. Physical Exam   Constitutional: He is oriented to person, place, and time. He appears well-developed and well-nourished. HENT:   Head: Normocephalic and atraumatic. Left Ear: Tympanic membrane normal.   Unable to visualize right TM due to cerumen   Eyes: Conjunctivae and EOM are normal. Pupils are equal, round, and reactive to light. Neck: Normal range of motion. Neck supple. Cardiovascular: Normal rate and regular rhythm. Pulmonary/Chest: Effort normal and breath sounds normal.   Abdominal: Soft. Bowel sounds are normal. He exhibits no distension. There is no tenderness. There is no rebound. Musculoskeletal: Normal range of motion. Neurological: He is alert and oriented to person, place, and time. He has normal strength. No cranial nerve deficit. Coordination normal. GCS eye subscore is 4. GCS verbal subscore is 5. GCS motor subscore is 6. Right-sided facial droop, including the forehead  Asymmetrical eyebrows, unable to lift right eyebrow  Normal upper and lower extremity strength and sensation. Normal gait. Skin: Skin is warm and dry. Psychiatric: He has a normal mood and affect. Thought content normal.   Nursing note and vitals reviewed. Diagnostic Study Results     Labs -  No results found for this or any previous visit (from the past 12 hour(s)). Radiologic Studies -   Ct Head Wo Cont    Result Date: 1/9/2018  PROCEDURE:  CT Head without Contrast           CPT code 91630          INDICATION:  Worsening headache. \"Heaviness\" in the right side of face. Possible facial droop. Dizziness. COMPARISON:  None.         TECHNIQUE:  Helical volumetric CT imaging of the head is performed from the base of the skull to the vertex without IV contrast administration. Axial, coronal and sagittal images are generated from the volumetric data set. - Radiation Dose:   mGy-cm. - Note:  Dose optimization techniques are utilized as appropriate to the performed exam with combination of automated exposure control, adjustment of mA and/or kV according to patient size, and use of iterative reconstructive technique. FINDINGS: The gray white matter differentiation is normal throughout. No mass effect is noted. No evidence of intracranial hemorrhage or hematoma or acute infarct is detected. The ventricles and cisterns are normal in size and configuration without evidence of abnormal enlargement or effacement. The visualized paranasal sinuses show mucosal thickening particularly in the left ethmoid sinuses. Mucous retention cysts in the right maxillary sinus. Minimal mucosal thickening in the left maxillary sinus medial wall. The calvarium is intact. IMPRESSION:             1.  No acute intracranial findings. 2.  Sinus mucosal disease. Medical Decision Making   I am the first provider for this patient. I reviewed the vital signs, available nursing notes, past medical history, past surgical history, family history and social history. Vital Signs-Reviewed the patient's vital signs. Pulse Oximetry Analysis -  96% on room air (Interpretation)    Records Reviewed: Nursing Notes (Time of Review: 11:54 AM)    Provider Notes (Medical Decision Making): Reviewed notes. Patient admitted approximately 1 week ago at [facility]. Had CTA negative. Had MRI that was engative. He said his symptoms were also going on then including the facial droop. I consulted teleneurology to see if they could add any further assistance. Dr. Robin Gutiérrez said it appears to be a bell's palsy. Since it has been going on for so long, did not recommend antivirals. Recommended a medrol dose pack.  Did not feel like additional imaging besides already negative CT head today would be of benefit. Patient said some of his symptoms hfe feels like could be due to his fluoxetine. He said he stopped the medication on his own 4 days ago and I am referring him back to his psychiatrist and a neurologist. Entire right face with motor weakness and negative recent MRI. ED Course: Progress Notes, Reevaluation, and Consults:    12:12 PM  Teleneurology evaluating the patient via patronus. 1:00 PM  Discussed patient with teleneurology. Patient states that his facial droop was present before his admission to [facility] and had a negative MRI at this time. Teleneurology was reassured, and stated that this was most likely Bell's Palsy and recommended a Medrol dose debbie. Diagnosis     Clinical Impression:   1. Bell's palsy    2. Nonintractable headache, unspecified chronicity pattern, unspecified headache type        Disposition: Discharge    Follow-up Information     Follow up With Details Comments Contact Info    Cecilio Golden MD Schedule an appointment as soon as possible for a visit  [address] [facility]  [phone]      Oliver Tyler MD In 2 days  [facility]  [phone]             Patient's Medications   Start Taking    METHYLPREDNISOLONE (MEDROL, DEBBIE,) 4 MG TABLET    Per dose pack instructions    OXYCODONE-ACETAMINOPHEN (PERCOCET) 5-325 MG PER TABLET    Take 1 Tab by mouth every four (4) hours as needed for Pain. Max Daily Amount: 6 Tabs. Continue Taking    ALBUTEROL (PROVENTIL HFA, VENTOLIN HFA, PROAIR HFA) 90 MCG/ACTUATION INHALER    Take 2 Puffs by inhalation every four (4) hours as needed for Wheezing. ALBUTEROL-IPRATROPIUM (DUO-NEB) 2.5 MG-0.5 MG/3 ML NEBU    3 mL by Nebulization route every four (4) hours as needed. APIXABAN (ELIQUIS) 5 MG TABLET    Take 2 Tabs by mouth two (2) times a day. Then 1 tab twice daily (2 days) afterwards for 3 months.   Indications: PULMONARY THROMBOEMBOLISM PREVENTION BUDESONIDE (PULMICORT) 0.5 MG/2 ML NBSP    2 mL by Nebulization route two (2) times a day. FAMOTIDINE (PEPCID) 20 MG TABLET    Take 1 Tab by mouth every twelve (12) hours. GUAIFENESIN ER (MUCINEX) 600 MG ER TABLET    Take 1 Tab by mouth two (2) times a day. TRAZODONE (DESYREL) 50 MG TABLET    Take 1 Tab by mouth nightly. Indications: insomnia associated with depression   These Medications have changed    No medications on file   Stop Taking    FLUOXETINE (PROZAC) 40 MG CAPSULE    Take 1 Cap by mouth daily. Indications: major depressive disorder     _______________________________    Attestations:  Scribe Attestation     Tracee Jazmyn acting as a scribe for and in the presence of Radha Zepeda MD      January 09, 2018 at 11:54 AM       Provider Attestation:      I personally performed the services described in the documentation, reviewed the documentation, as recorded by the scribe in my presence, and it accurately and completely records my words and actions.  January 09, 2018 at 11:54 AM - Radha Zepeda MD    _______________________________

## 2018-01-09 NOTE — ED TRIAGE NOTES
Pt states he needs to see Dr. Janes Gillespie in behavior medicine. States the medicine is making him have headaches states his depression is worse. States he is having thoughts of hurting self.   I just got into with police,  States he does not know what he is getting into if he goes out there

## 2018-01-09 NOTE — ED NOTES
Assumed care for discharge instructions only, prescriptions x 2 were reviewed with patient who verbalized understanding. Lunch tray provided.

## 2018-01-09 NOTE — DISCHARGE INSTRUCTIONS
Bell's Palsy: Care Instructions  Your Care Instructions    Bell's palsy is paralysis or weakness of the muscles on one side of the face. Often people with Bell's palsy have a droop on one side of the mouth and have trouble completely shutting the eye on the same side. Bell's palsy can also interfere with the sense of taste. These things happen when a nerve in your face becomes inflamed. Bell's palsy is not caused by a stroke. The cause of the nerve inflammation is not known. But some experts think that a virus may cause it. Because of this, doctors sometimes prescribe antiviral medicine to treat it. You also may get medicine to reduce swelling. Bell's palsy usually gets better on its own in a few weeks or months. Follow-up care is a key part of your treatment and safety. Be sure to make and go to all appointments, and call your doctor if you are having problems. It's also a good idea to know your test results and keep a list of the medicines you take. How can you care for yourself at home? · Take your medicines exactly as prescribed. Call your doctor if you think you are having a problem with your medicine. You will get more details on the specific medicines your doctor prescribes. · Use artificial tears or ointment if your eyes are too dry. Bell's palsy can make your lower eyelid droop, causing a dry eye. · If you cannot completely close your eye, consider using an eye patch while you sleep. · Help yourself blink by using your finger to close and open your eyelid. This may help keep your eye moist.  · Wear glasses or goggles to keep dust and dirt out of your eye. · As feeling comes back to your face, massage your forehead, cheeks, and lips. Massage may make the muscles in your face stronger. · Brush and floss your teeth often to help prevent tooth decay. Bell's palsy can dry up the spit on one side of your mouth. This increases the risk of tooth decay. When should you call for help?   Call 911 anytime you think you may need emergency care. For example, call if:  ? · You have symptoms of a stroke. These may include:  ¨ Sudden numbness, tingling, weakness, or loss of movement in your face, arm, or leg, especially on only one side of your body. ¨ Sudden vision changes. ¨ Sudden trouble speaking. ¨ Sudden confusion or trouble understanding simple statements. ¨ Sudden problems with walking or balance. ¨ A sudden, severe headache that is different from past headaches. ?Call your doctor now or seek immediate medical care if:  ? · You have numbness or weakness that spreads beyond one side of your face. ? · You have a skin rash or eye pain or redness, or light bothers your eyes. ? · You have a new or worse headache. ? Watch closely for changes in your health, and be sure to contact your doctor if:  ? · You do not get better as expected. Where can you learn more? Go to http://evangelina-anne-marie.info/. Enter P168 in the search box to learn more about \"Bell's Palsy: Care Instructions. \"  Current as of: October 14, 2016  Content Version: 11.4  © 7161-7522 [employer]. Care instructions adapted under license by [employer] (which disclaims liability or warranty for this information). If you have questions about a medical condition or this instruction, always ask your healthcare professional. [address] any warranty or liability for your use of this information.

## 2018-01-18 ENCOUNTER — OFFICE VISIT (OUTPATIENT)
Dept: FAMILY MEDICINE CLINIC | Age: 28
End: 2018-01-18

## 2018-01-18 VITALS
HEART RATE: 103 BPM | RESPIRATION RATE: 16 BRPM | WEIGHT: 311 LBS | SYSTOLIC BLOOD PRESSURE: 138 MMHG | HEIGHT: 76 IN | OXYGEN SATURATION: 96 % | DIASTOLIC BLOOD PRESSURE: 85 MMHG | BODY MASS INDEX: 37.87 KG/M2 | TEMPERATURE: 99.3 F

## 2018-01-18 DIAGNOSIS — R51.9 NONINTRACTABLE HEADACHE, UNSPECIFIED CHRONICITY PATTERN, UNSPECIFIED HEADACHE TYPE: Primary | ICD-10-CM

## 2018-01-18 NOTE — PROGRESS NOTES
HISTORY OF PRESENT ILLNESS  Tomi Romeo is a 32 y.o. male. Follow-up   The history is provided by the patient. This is a new problem. Episode onset: C/o recurrent headaches. HA tends to start frontal w/o aura. It then generalizes. C/o associated nausea, photo and phonophobia. Lasts 2-3 hours. No previous HA problems. Seen in ED several times. CT head unremarkable. Labs unrevealing. Episode frequency: Seen by neurology who diagnosed Bell's Palsy but he reports facial droop is chronic and started years before HA. Denies current drug use. Progression since onset: Was treated with prednisone dose pack w/o significant improvement. Review of Systems   Constitutional:        H/o single episode of fever several days ago which resolved. Respiratory: Negative. Cardiovascular: Negative. Physical Exam   Constitutional: He is oriented to person, place, and time. He appears well-developed and well-nourished. HENT:   Head: Normocephalic and atraumatic. Right Ear: External ear normal.   Left Ear: External ear normal.   Eyes: Conjunctivae are normal.   Neck: Neck supple. No JVD present. No tracheal deviation present. No thyromegaly present. Cardiovascular: Normal rate, regular rhythm and normal heart sounds. Pulmonary/Chest: Effort normal and breath sounds normal.   Lymphadenopathy:     He has no cervical adenopathy. Neurological: He is alert and oriented to person, place, and time. He has normal reflexes. He displays normal reflexes. No cranial nerve deficit. He exhibits normal muscle tone. Coordination normal.   Psychiatric: He has a normal mood and affect. His behavior is normal. Judgment and thought content normal.       ASSESSMENT and PLAN  Headaches: Etiology unclear. Some symptoms c/w migraine. Trial of increasing prednisone to 60mg/day for 5 days then taper. He was given a written guide to tapering the med. F/u if symptoms persist.  Consider neurology referral if not improving.

## 2018-04-23 ENCOUNTER — HOSPITAL ENCOUNTER (EMERGENCY)
Age: 28
Discharge: HOME | End: 2018-04-23
Attending: EMERGENCY MEDICINE
Payer: SELF-PAY

## 2018-04-23 VITALS
OXYGEN SATURATION: 97 % | RESPIRATION RATE: 16 BRPM | SYSTOLIC BLOOD PRESSURE: 143 MMHG | DIASTOLIC BLOOD PRESSURE: 95 MMHG | HEIGHT: 76 IN | WEIGHT: 300 LBS | BODY MASS INDEX: 36.53 KG/M2 | HEART RATE: 111 BPM

## 2018-04-23 DIAGNOSIS — M25.562 ARTHRALGIA OF LEFT LOWER LEG: Primary | ICD-10-CM

## 2018-04-23 PROCEDURE — 93971 EXTREMITY STUDY: CPT

## 2018-04-23 PROCEDURE — 99283 EMERGENCY DEPT VISIT LOW MDM: CPT

## 2018-04-23 PROCEDURE — 74011250637 HC RX REV CODE- 250/637: Performed by: PHYSICIAN ASSISTANT

## 2018-04-23 RX ORDER — ACETAMINOPHEN 500 MG
1000 TABLET ORAL
Status: COMPLETED | OUTPATIENT
Start: 2018-04-23 | End: 2018-04-23

## 2018-04-23 RX ORDER — HYDROCODONE BITARTRATE AND ACETAMINOPHEN 5; 325 MG/1; MG/1
1 TABLET ORAL
Qty: 12 TAB | Refills: 0 | Status: SHIPPED | OUTPATIENT
Start: 2018-04-23 | End: 2018-05-30

## 2018-04-23 RX ADMIN — ACETAMINOPHEN 1000 MG: 500 TABLET, FILM COATED ORAL at 15:36

## 2018-04-23 NOTE — DISCHARGE INSTRUCTIONS
Musculoskeletal Pain: Care Instructions  Your Care Instructions    Different problems with the bones, muscles, nerves, ligaments, and tendons in the body can cause pain. One or more areas of your body may ache or burn. Or they may feel tired, stiff, or sore. The medical term for this type of pain is musculoskeletal pain. It can have many different causes. Sometimes the pain is caused by an injury such as a strain or sprain. Or you might have pain from using one part of your body in the same way over and over again. This is called overuse. In some cases, the cause of the pain is another health problem such as arthritis or fibromyalgia. The doctor will examine you and ask you questions about your health to help find the cause of your pain. Blood tests or imaging tests like an X-ray may also be helpful. But sometimes doctors can't find a cause of the pain. Treatment depends on your symptoms and the cause of the pain, if known. The doctor has checked you carefully, but problems can develop later. If you notice any problems or new symptoms, get medical treatment right away. Follow-up care is a key part of your treatment and safety. Be sure to make and go to all appointments, and call your doctor if you are having problems. It's also a good idea to know your test results and keep a list of the medicines you take. How can you care for yourself at home? · Rest until you feel better. · Do not do anything that makes the pain worse. Return to exercise gradually if you feel better and your doctor says it's okay. · Be safe with medicines. Read and follow all instructions on the label. ¨ If the doctor gave you a prescription medicine for pain, take it as prescribed. ¨ If you are not taking a prescription pain medicine, ask your doctor if you can take an over-the-counter medicine. · Put ice or a cold pack on the area for 10 to 20 minutes at a time to ease pain.  Put a thin cloth between the ice and your skin.  When should you call for help? Call your doctor now or seek immediate medical care if:  ? · You have new pain, or your pain gets worse. ? · You have new symptoms such as a fever, a rash, or chills. ? Watch closely for changes in your health, and be sure to contact your doctor if:  ? · You do not get better as expected. Where can you learn more? Go to http://evangelina-anne-marie.info/. Enter O647 in the search box to learn more about \"Musculoskeletal Pain: Care Instructions. \"  Current as of: October 14, 2016  Content Version: 11.4  © 3231-9155 [employer]. Care instructions adapted under license by [employer] (which disclaims liability or warranty for this information). If you have questions about a medical condition or this instruction, always ask your healthcare professional. [address] any warranty or liability for your use of this information.

## 2018-04-23 NOTE — PROCEDURES
[facility]/[address]  *** FINAL REPORT ***    Name: Myriam Cormier  MRN: [id number]    Outpatient  : 16 Oct 1990  HIS Order #: [id number]  [address] [facility] Visit #: [id number]  Date: 2018    TYPE OF TEST: Peripheral Venous Testing    REASON FOR TEST  Pain in limb, Limb swelling, history of left lower leg DVT 2017    Left Leg:-  Deep venous thrombosis:           No  Superficial venous thrombosis:    No  Deep venous insufficiency:        Not examined  Superficial venous insufficiency: Not examined      INTERPRETATION/FINDINGS  Duplex images were obtained using 2-D gray scale, color flow, and  spectral Doppler analysis. Left leg :  1. Deep vein(s) visualized include the common femoral, deep femoral,  proximal femoral, mid femoral, distal femoral, popliteal(above knee),  popliteal(fossa), popliteal(below knee), posterior tibial,  gastrocnemius, soleal and peroneal veins. 2. No evidence of deep venous thrombosis detected in the veins  visualized. 3. No evidence of deep vein thrombosis in the contralateral common  femoral vein. 4. Superficial vein(s) visualized include the great saphenous and  small saphenous veins. 5. No evidence of superficial thrombosis detected. ADDITIONAL COMMENTS    I have personally reviewed the data relevant to the interpretation of  this  study. TECHNOLOGIST: Kymberly Saucedo. Ibeth, RTR, RVT  Signed: 2018 04:59 PM    PHYSICIAN: Marisela Dhillon.  Nabila Bateman MD  Signed: 2018 11:42 PM

## 2018-04-23 NOTE — ED PROVIDER NOTES
EMERGENCY DEPARTMENT HISTORY AND PHYSICAL EXAM    Date: 4/23/2018  Patient Name: Wilmer Zambrano    History of Presenting Illness     Chief Complaint   Patient presents with    Leg Pain         History Provided By: Patient    Chief Complaint: calf pain  Duration: yesterday evening   Timing:  Constant  Location: left sided  Severity: 9 out of 10  Modifying Factors: Pt had a previous DVT and says it feels like that. He use to be on  Eliquis but no longer takes it. Associated Symptoms: Denies SOB and leg swelling. Additional History (Context): Wilmer Zambrano is a 32 y.o. male with asthma and thomobembolus who presents with constant left sided 9/10 calf pain that started yesterday evening. Pt had a previous DVT and says it feels like that. He use to be on Eliquis but no longer takes it. Denies SOB and leg swelling. Pt is a former smoker and does not drink. Pt travels a lot. PCP: None        Past History     Past Medical History:  Past Medical History:   Diagnosis Date    Abnormal TSH 11/16/2017    Asthma     MDD (major depressive disorder), recurrent severe, without psychosis ([facility] [address] 11/16/2017    Thromboembolus ([facility] [address] 09/2017       Past Surgical History:  History reviewed. No pertinent surgical history. Family History:  Family History   Problem Relation Age of Onset    No Known Problems Mother     Liver Disease Father        Social History:  Social History   Substance Use Topics    Smoking status: Former Smoker     Types: Cigarettes    Smokeless tobacco: Never Used    Alcohol use No       Allergies:  No Known Allergies      Review of Systems   Review of Systems   Respiratory: Negative for shortness of breath. Cardiovascular: Negative for leg swelling. Musculoskeletal:        Positive for calf pain   All other systems reviewed and are negative.     All Other Systems Negative  Physical Exam     Vitals:    04/23/18 1430   BP: (!) 143/95   Pulse: (!) 111   Resp: 16   SpO2: 97%   Weight: 136.1 kg (300 lb)   Height: 6' 4\" (1.93 m)     Physical Exam   Constitutional: He is oriented to person, place, and time. He appears well-developed and well-nourished. HENT:   Head: Normocephalic and atraumatic. Mouth/Throat: Oropharynx is clear and moist.   Eyes: Conjunctivae are normal.   Neck: Normal range of motion. Cardiovascular: Regular rhythm, normal heart sounds and intact distal pulses. Tachycardia present. Pulmonary/Chest: Effort normal. No respiratory distress. He has no wheezes. He has no rales. Abdominal: Soft. He exhibits no distension. Musculoskeletal:        Left ankle: Normal. Achilles tendon normal.        Left lower leg: He exhibits tenderness. He exhibits no bony tenderness, no swelling, no edema, no deformity and no laceration. Left foot: Normal.   Neurological: He is oriented to person, place, and time. Nursing note and vitals reviewed. Diagnostic Study Results     Labs -   No results found for this or any previous visit (from the past 12 hour(s)). Radiologic Studies -   DUPLEX LOWER EXT VENOUS LEFT   Final Result        CT Results  (Last 48 hours)    None        CXR Results  (Last 48 hours)    None            Medical Decision Making   I am the first provider for this patient. I reviewed the vital signs, available nursing notes, past medical history, past surgical history, family history and social history. Vital Signs-Reviewed the patient's vital signs. Pulse Oximetry Analysis - 97% on room air    Records Reviewed: Nursing Notes    Procedures:  Procedures    Provider Notes (Medical Decision Making):   US negative for DVT. Will d/c home with analgesia and pcp f/u    MED RECONCILIATION:  No current facility-administered medications for this encounter. Current Outpatient Prescriptions   Medication Sig    HYDROcodone-acetaminophen (NORCO) 5-325 mg per tablet Take 1 Tab by mouth every four (4) hours as needed for Pain. Max Daily Amount: 6 Tabs. Disposition:  D/c    DISCHARGE NOTE:     Pt has been reexamined. Patient has no new complaints, changes, or physical findings. Care plan outlined and precautions discussed. Results of UA were reviewed with the patient. All medications were reviewed with the patient; will d/c home with pain medication. All of pt's questions and concerns were addressed. Patient was instructed and agrees to follow up with pcp, as well as to return to the ED upon further deterioration. Patient is ready to go home. Follow-up Information     Follow up With Details Comments Contact Info    [facility] EMERGENCY DEPT   [address] [place] [id number]  [phone]          Discharge Medication List as of 4/23/2018  5:09 PM      START taking these medications    Details   HYDROcodone-acetaminophen (NORCO) 5-325 mg per tablet Take 1 Tab by mouth every four (4) hours as needed for Pain. Max Daily Amount: 6 Tabs., Print, Disp-12 Tab, R-0               Diagnosis     Clinical Impression:   1. Arthralgia of left lower leg        Scribe Attestation     Reynaldo Mir acting as a scribe for and in the presence of Shantal Lovema      April 23, 2018 at 2:32 PM       Provider Attestation:      I personally performed the services described in the documentation, reviewed the documentation, as recorded by the scribe in my presence, and it accurately and completely records my words and actions.  April 23, 2018 at 2:32 PM - [facility]

## 2018-05-24 ENCOUNTER — APPOINTMENT (OUTPATIENT)
Dept: GENERAL RADIOLOGY | Age: 28
End: 2018-05-24
Attending: PHYSICIAN ASSISTANT
Payer: SELF-PAY

## 2018-05-24 ENCOUNTER — HOSPITAL ENCOUNTER (EMERGENCY)
Age: 28
Discharge: HOME | End: 2018-05-24
Attending: EMERGENCY MEDICINE
Payer: SELF-PAY

## 2018-05-24 VITALS
BODY MASS INDEX: 38.36 KG/M2 | TEMPERATURE: 98 F | WEIGHT: 315 LBS | OXYGEN SATURATION: 96 % | HEART RATE: 101 BPM | RESPIRATION RATE: 16 BRPM | HEIGHT: 76 IN | SYSTOLIC BLOOD PRESSURE: 139 MMHG | DIASTOLIC BLOOD PRESSURE: 79 MMHG

## 2018-05-24 DIAGNOSIS — S63.502A SPRAIN OF LEFT WRIST, INITIAL ENCOUNTER: Primary | ICD-10-CM

## 2018-05-24 PROCEDURE — 99283 EMERGENCY DEPT VISIT LOW MDM: CPT

## 2018-05-24 PROCEDURE — L3809 WHFO W/O JOINTS PRE OTS: HCPCS

## 2018-05-24 PROCEDURE — 73130 X-RAY EXAM OF HAND: CPT

## 2018-05-24 PROCEDURE — 74011250637 HC RX REV CODE- 250/637: Performed by: PHYSICIAN ASSISTANT

## 2018-05-24 PROCEDURE — 73110 X-RAY EXAM OF WRIST: CPT

## 2018-05-24 RX ORDER — HYDROCODONE BITARTRATE AND ACETAMINOPHEN 5; 325 MG/1; MG/1
1 TABLET ORAL
Status: COMPLETED | OUTPATIENT
Start: 2018-05-24 | End: 2018-05-24

## 2018-05-24 RX ORDER — NAPROXEN 500 MG/1
500 TABLET ORAL 2 TIMES DAILY WITH MEALS
Qty: 20 TAB | Refills: 0 | Status: SHIPPED | OUTPATIENT
Start: 2018-05-24 | End: 2018-05-30

## 2018-05-24 RX ADMIN — HYDROCODONE BITARTRATE AND ACETAMINOPHEN 1 TABLET: 5; 325 TABLET ORAL at 13:45

## 2018-05-24 NOTE — ED PROVIDER NOTES
EMERGENCY DEPARTMENT HISTORY AND PHYSICAL EXAM    Date: 5/24/2018  Patient Name: Ava Jara    History of Presenting Illness     Chief Complaint   Patient presents with    Wrist Pain     left side         History Provided By: Patient    Chief Complaint: Wrist pain and swelling s/p an injury  Duration: Hours  Timing:  Acute  Location: Left  Severity: 10 out of 10  Modifying Factors: Pain exacerbates with movement  Associated Symptoms: denies any other associated signs or symptoms      Additional History (Context):     Ava Jara is a 32 y.o. male with a pertinent history of asthma, presenting to the ED c/o acute, left wrist pain and swelling starting last night s/p an injury. Explains he fell on the hand. States he is unable to form a complete fist with the hand. Pain exacerbates with movement. No other acute symptoms or complaints were noted. PCP: None    Current Outpatient Prescriptions   Medication Sig Dispense Refill    naproxen (NAPROSYN) 500 mg tablet Take 1 Tab by mouth two (2) times daily (with meals) for 10 days. 20 Tab 0    HYDROcodone-acetaminophen (NORCO) 5-325 mg per tablet Take 1 Tab by mouth every four (4) hours as needed for Pain. Max Daily Amount: 6 Tabs. 12 Tab 0       Past History     Past Medical History:  Past Medical History:   Diagnosis Date    Abnormal TSH 11/16/2017    Asthma     MDD (major depressive disorder), recurrent severe, without psychosis ([facility] [address] 11/16/2017    Thromboembolus ([facility] [address] 09/2017       Past Surgical History:  No past surgical history on file. Family History:  Family History   Problem Relation Age of Onset    No Known Problems Mother     Liver Disease Father        Social History:  Social History   Substance Use Topics    Smoking status: Former Smoker     Types: Cigarettes    Smokeless tobacco: Never Used    Alcohol use No       Allergies:  No Known Allergies      Review of Systems   Review of Systems   Constitutional: Negative for fever. Musculoskeletal: Positive for arthralgias and joint swelling. Skin: Negative for rash. All other systems reviewed and are negative. All Other Systems Negative  Physical Exam     Vitals:    05/24/18 1327   BP: 139/79   Pulse: (!) 101   Resp: 16   Temp: 98 °F (36.7 °C)   SpO2: 96%   Weight: 145.2 kg (320 lb)   Height: 6' 4\" (1.93 m)     Physical Exam   Constitutional: He is oriented to person, place, and time. He appears well-developed and well-nourished. No distress. HENT:   Head: Normocephalic and atraumatic. Eyes: Conjunctivae are normal.   Neck: Normal range of motion. Neck supple. Cardiovascular: Normal rate, regular rhythm and normal heart sounds. Pulmonary/Chest: Effort normal and breath sounds normal. No respiratory distress. He has no wheezes. He has no rales. Musculoskeletal: Normal range of motion. Diffuse tenderness to left wrist and dorsum of left hand. Mild swelling noted. No deformity. Radial pulse 2+. Neurological: He is alert and oriented to person, place, and time. Skin: Skin is warm and dry. Psychiatric: He has a normal mood and affect. His behavior is normal. Judgment and thought content normal.   Nursing note and vitals reviewed. Diagnostic Study Results     Labs -   No results found for this or any previous visit (from the past 12 hour(s)). Radiologic Studies -   XR HAND LT MIN 3 V    (Results Pending)   XR WRIST LT AP/LAT/OBL MIN 3V    (Results Pending)     CT Results  (Last 48 hours)    None        CXR Results  (Last 48 hours)    None            Medical Decision Making   I am the first provider for this patient. I reviewed the vital signs, available nursing notes, past medical history, past surgical history, family history and social history. Vital Signs-Reviewed the patient's vital signs.     Pulse Oximetry Analysis - 96% on RA    Records Reviewed: Nursing Notes and Old Medical Records    Procedures:  Splint, Volar  Date/Time: 5/24/2018 2:13 PM  Performed by: Barbara Solomon  Authorized by: Barbara Solomon     Consent:     Consent obtained:  Verbal    Consent given by:  Patient    Risks discussed:  Discoloration, numbness, pain and swelling    Alternatives discussed:  No treatment  Pre-procedure details:     Sensation:  Normal  Procedure details:     Laterality:  Left    Location:  Wrist    Wrist:  L wrist    Splint type:  Volar short arm    Supplies:  Prefabricated splint  Post-procedure details:     Pain:  Improved    Sensation:  Normal    Patient tolerance of procedure: Tolerated well, no immediate complications        Provider Notes (Medical Decision Making):     2:10 PM Pt well appearing and in NAD. No fx noted on x-ray. Rad read pending. Splinted for comfort. PCP f/u as needed. MED RECONCILIATION:  No current facility-administered medications for this encounter. Current Outpatient Prescriptions   Medication Sig    naproxen (NAPROSYN) 500 mg tablet Take 1 Tab by mouth two (2) times daily (with meals) for 10 days.  HYDROcodone-acetaminophen (NORCO) 5-325 mg per tablet Take 1 Tab by mouth every four (4) hours as needed for Pain. Max Daily Amount: 6 Tabs. Disposition:  Discharge     DISCHARGE NOTE:     Pt has been reexamined. Patient has no new complaints, changes, or physical findings. Care plan outlined and precautions discussed. Results of x-rays were reviewed with the patient. All medications were reviewed with the patient; will d/c home with naprosyn. All of pt's questions and concerns were addressed. Patient was instructed and agrees to follow up with PCP, as well as to return to the ED upon further deterioration. Patient is ready to go home.     Follow-up Information     Follow up With Details Comments [address]  As needed [address] [facility] EMERGENC[facility] DEPT  Immediately if symptoms worsen [address] [place], [place] [id number] [phone] Current Discharge Medication List      START taking these medications    Details   naproxen (NAPROSYN) 500 mg tablet Take 1 Tab by mouth two (2) times daily (with meals) for 10 days. Qty: 20 Tab, Refills: 0             Diagnosis     Clinical Impression:   1. Sprain of left wrist, initial encounter        Scribe Attestation:     Carlene Rivers acting as a scribe for and in the presence of Evelio Ag PA-C    May 24, 2018 at 2:03 PM       Provider Attestation:     I personally performed the services described in the documentation, reviewed the documentation, as recorded by the scribe in my presence, and it accurately and completely records my words and actions.  May 24, 2018 at 2:03 PM - Evelio Ag PA-C

## 2018-05-24 NOTE — DISCHARGE INSTRUCTIONS
Wrist Sprain: Care Instructions  Your Care Instructions    Your wrist hurts because you have stretched or torn ligaments, which connect the bones in your wrist.  Wrist sprains usually take from 2 to 10 weeks to heal, but some take longer. Usually, the more pain you have, the more severe your wrist sprain is and the longer it will take to heal. You can heal faster and regain strength in your wrist with good home treatment. Follow-up care is a key part of your treatment and safety. Be sure to make and go to all appointments, and call your doctor if you are having problems. It's also a good idea to know your test results and keep a list of the medicines you take. How can you care for yourself at home? · Prop up your arm on a pillow when you ice it or anytime you sit or lie down for the next 3 days. Try to keep your wrist above the level of your heart. This will help reduce swelling. · Put ice or cold packs on your wrist for 10 to 20 minutes at a time. Try to do this every 1 to 2 hours for the next 3 days (when you are awake) or until the swelling goes down. Put a thin cloth between the ice pack and your skin. · After 2 or 3 days, if your swelling is gone, apply a heating pad set on low or a warm cloth to your wrist. This helps keep your wrist flexible. Some doctors suggest that you go back and forth between hot and cold. · If you have an elastic bandage, keep it on for the next 24 to 36 hours. The bandage should be snug but not so tight that it causes numbness or tingling. To rewrap the wrist, wrap the bandage around the hand a few times, beginning at the fingers. Then wrap it around the hand between the thumb and index finger, ending by circling the wrist several times. · If your doctor gave you a splint or brace, wear it as directed to protect your wrist until it has healed. · Take pain medicines exactly as directed. ¨ If the doctor gave you a prescription medicine for pain, take it as prescribed.   ¨ If you are not taking a prescription pain medicine, ask your doctor if you can take an over-the-counter medicine. · Try not to use your injured wrist and hand. When should you call for help? Call your doctor now or seek immediate medical care if:  ? · Your hand or fingers are cool or pale or change color. ? Watch closely for changes in your health, and be sure to contact your doctor if:  ? · Your pain gets worse. ? · Your wrist has not improved after 1 week. Where can you learn more? Go to http://evangelina-anne-marie.info/. Enter G541 in the search box to learn more about \"Wrist Sprain: Care Instructions. \"  Current as of: March 21, 2017  Content Version: 11.4  © 4894-1752 [employer]. Care instructions adapted under license by [employer] (which disclaims liability or warranty for this information). If you have questions about a medical condition or this instruction, always ask your healthcare professional. [address] any warranty or liability for your use of this information.

## 2018-05-24 NOTE — ED TRIAGE NOTES
Pt. States \"I fell on my wrist last night and now I can't move it\" refers to left wrist. Observed mild swelling to area.

## 2018-05-30 ENCOUNTER — APPOINTMENT (OUTPATIENT)
Dept: GENERAL RADIOLOGY | Age: 28
End: 2018-05-30
Attending: PHYSICIAN ASSISTANT
Payer: SELF-PAY

## 2018-05-30 ENCOUNTER — HOSPITAL ENCOUNTER (EMERGENCY)
Age: 28
Discharge: HOME | End: 2018-05-30
Attending: EMERGENCY MEDICINE
Payer: SELF-PAY

## 2018-05-30 VITALS
RESPIRATION RATE: 19 BRPM | SYSTOLIC BLOOD PRESSURE: 141 MMHG | HEART RATE: 112 BPM | TEMPERATURE: 98.1 F | DIASTOLIC BLOOD PRESSURE: 92 MMHG | OXYGEN SATURATION: 98 %

## 2018-05-30 DIAGNOSIS — J06.9 ACUTE UPPER RESPIRATORY INFECTION: ICD-10-CM

## 2018-05-30 DIAGNOSIS — R03.0 ELEVATED BLOOD PRESSURE READING: ICD-10-CM

## 2018-05-30 DIAGNOSIS — J45.909 MILD ASTHMA WITHOUT COMPLICATION, UNSPECIFIED WHETHER PERSISTENT: Primary | ICD-10-CM

## 2018-05-30 PROCEDURE — 77030029684 HC NEB SM VOL KT MONA -A

## 2018-05-30 PROCEDURE — 94640 AIRWAY INHALATION TREATMENT: CPT

## 2018-05-30 PROCEDURE — 74011250637 HC RX REV CODE- 250/637: Performed by: PHYSICIAN ASSISTANT

## 2018-05-30 PROCEDURE — 99283 EMERGENCY DEPT VISIT LOW MDM: CPT

## 2018-05-30 PROCEDURE — 74011000250 HC RX REV CODE- 250: Performed by: PHYSICIAN ASSISTANT

## 2018-05-30 PROCEDURE — 74011636637 HC RX REV CODE- 636/637: Performed by: PHYSICIAN ASSISTANT

## 2018-05-30 PROCEDURE — 74011000250 HC RX REV CODE- 250

## 2018-05-30 PROCEDURE — 71046 X-RAY EXAM CHEST 2 VIEWS: CPT

## 2018-05-30 RX ORDER — PREDNISONE 50 MG/1
50 TABLET ORAL DAILY
Qty: 5 TAB | Refills: 0 | Status: SHIPPED | OUTPATIENT
Start: 2018-05-30 | End: 2018-06-04

## 2018-05-30 RX ORDER — PREDNISONE 20 MG/1
60 TABLET ORAL
Status: COMPLETED | OUTPATIENT
Start: 2018-05-30 | End: 2018-05-30

## 2018-05-30 RX ORDER — IPRATROPIUM BROMIDE AND ALBUTEROL SULFATE 2.5; .5 MG/3ML; MG/3ML
6 SOLUTION RESPIRATORY (INHALATION)
Status: COMPLETED | OUTPATIENT
Start: 2018-05-30 | End: 2018-05-30

## 2018-05-30 RX ORDER — IPRATROPIUM BROMIDE AND ALBUTEROL SULFATE 2.5; .5 MG/3ML; MG/3ML
3 SOLUTION RESPIRATORY (INHALATION) ONCE
Status: COMPLETED | OUTPATIENT
Start: 2018-05-30 | End: 2018-05-30

## 2018-05-30 RX ORDER — IPRATROPIUM BROMIDE AND ALBUTEROL SULFATE 2.5; .5 MG/3ML; MG/3ML
SOLUTION RESPIRATORY (INHALATION)
Status: COMPLETED
Start: 2018-05-30 | End: 2018-05-30

## 2018-05-30 RX ORDER — IBUPROFEN 600 MG/1
600 TABLET ORAL
Status: COMPLETED | OUTPATIENT
Start: 2018-05-30 | End: 2018-05-30

## 2018-05-30 RX ORDER — ALBUTEROL SULFATE 0.83 MG/ML
2.5 SOLUTION RESPIRATORY (INHALATION)
Qty: 30 EACH | Refills: 0 | OUTPATIENT
Start: 2018-05-30 | End: 2020-09-30

## 2018-05-30 RX ORDER — ALBUTEROL SULFATE 90 UG/1
2 AEROSOL, METERED RESPIRATORY (INHALATION)
Qty: 1 INHALER | Refills: 0 | OUTPATIENT
Start: 2018-05-30 | End: 2020-09-30

## 2018-05-30 RX ADMIN — IPRATROPIUM BROMIDE AND ALBUTEROL SULFATE 6 ML: 2.5; .5 SOLUTION RESPIRATORY (INHALATION) at 14:48

## 2018-05-30 RX ADMIN — IPRATROPIUM BROMIDE AND ALBUTEROL SULFATE 3 ML: 2.5; .5 SOLUTION RESPIRATORY (INHALATION) at 15:50

## 2018-05-30 RX ADMIN — IBUPROFEN 600 MG: 600 TABLET ORAL at 15:50

## 2018-05-30 RX ADMIN — PREDNISONE 60 MG: 20 TABLET ORAL at 15:50

## 2018-05-30 NOTE — DISCHARGE INSTRUCTIONS
Elevated Blood Pressure: Care Instructions  Your Care Instructions    Blood pressure is a measure of how hard the blood pushes against the walls of your arteries. It's normal for blood pressure to go up and down throughout the day. But if it stays up over time, you have high blood pressure. Two numbers tell you your blood pressure. The first number is the systolic pressure. It shows how hard the blood pushes when your heart is pumping. The second number is the diastolic pressure. It shows how hard the blood pushes between heartbeats, when your heart is relaxed and filling with blood. An ideal blood pressure in adults is less than 120/80 (say \"120 over 80\"). High blood pressure is 140/90 or higher. You have high blood pressure if your top number is 140 or higher or your bottom number is 90 or higher, or both. The main test for high blood pressure is simple, fast, and painless. To diagnose high blood pressure, your doctor will test your blood pressure at different times. After testing your blood pressure, your doctor may ask you to test it again when you are home. If you are diagnosed with high blood pressure, you can work with your doctor to make a long-term plan to manage it. Follow-up care is a key part of your treatment and safety. Be sure to make and go to all appointments, and call your doctor if you are having problems. It's also a good idea to know your test results and keep a list of the medicines you take. How can you care for yourself at home? · Do not smoke. Smoking increases your risk for heart attack and stroke. If you need help quitting, talk to your doctor about stop-smoking programs and medicines. These can increase your chances of quitting for good. · Stay at a healthy weight. · Try to limit how much sodium you eat to less than 2,300 milligrams (mg) a day. Your doctor may ask you to try to eat less than 1,500 mg a day. · Be physically active.  Get at least 30 minutes of exercise on most days of the week. Walking is a good choice. You also may want to do other activities, such as running, swimming, cycling, or playing tennis or team sports. · Avoid or limit alcohol. Talk to your doctor about whether you can drink any alcohol. · Eat plenty of fruits, vegetables, and low-fat dairy products. Eat less saturated and total fats. · Learn how to check your blood pressure at home. When should you call for help? Call your doctor now or seek immediate medical care if:  ? · Your blood pressure is much higher than normal (such as 180/110 or higher). ? · You think high blood pressure is causing symptoms such as:  ¨ Severe headache. ¨ Blurry vision. ? Watch closely for changes in your health, and be sure to contact your doctor if:  ? · You do not get better as expected. Where can you learn more? Go to http://evangelinaSecurensanne-marie.info/. Enter F437 in the search box to learn more about \"Elevated Blood Pressure: Care Instructions. \"  Current as of: September 21, 2016  Content Version: 11.4  © 4651-8719 [employer]. Care instructions adapted under license by [employer] (which disclaims liability or warranty for this information). If you have questions about a medical condition or this instruction, always ask your healthcare professional. [address] any warranty or liability for your use of this information. Asthma Attack: Care Instructions  Your Care Instructions    During an asthma attack, the airways swell and narrow. This makes it hard to breathe. Severe asthma attacks can be life-threatening, but you can help prevent them by keeping your asthma under control and treating symptoms before they get bad. Symptoms include being short of breath, having chest tightness, coughing, and wheezing. Noting and treating these symptoms can also help you avoid future trips to the emergency room.   The doctor has checked you carefully, but problems can develop later. If you notice any problems or new symptoms, get medical treatment right away. Follow-up care is a key part of your treatment and safety. Be sure to make and go to all appointments, and call your doctor if you are having problems. It's also a good idea to know your test results and keep a list of the medicines you take. How can you care for yourself at home? · Follow your asthma action plan to prevent and treat attacks. If you don't have an asthma action plan, work with your doctor to create one. · Take your asthma medicines exactly as prescribed. Talk to your doctor right away if you have any questions about how to take them. ¨ Use your quick-relief medicine when you have symptoms of an attack. Quick-relief medicine is usually an albuterol inhaler. Some people need to use quick-relief medicine before they exercise. ¨ Take your controller medicine every day, not just when you have symptoms. Controller medicine is usually an inhaled corticosteroid. The goal is to prevent problems before they occur. Don't use your controller medicine to treat an attack that has already started. It doesn't work fast enough to help. ¨ If your doctor prescribed corticosteroid pills to use during an attack, take them exactly as prescribed. It may take hours for the pills to work, but they may make the episode shorter and help you breathe better. ¨ Keep your quick-relief medicine with you at all times. · Talk to your doctor before using other medicines. Some medicines, such as aspirin, can cause asthma attacks in some people. · If you have a peak flow meter, use it to check how well you are breathing. This can help you predict when an asthma attack is going to occur. Then you can take medicine to prevent the asthma attack or make it less severe. · Do not smoke or allow others to smoke around you. Avoid smoky places. Smoking makes asthma worse.  If you need help quitting, talk to your doctor about stop-smoking programs and medicines. These can increase your chances of quitting for good. · Learn what triggers an asthma attack for you, and avoid the triggers when you can. Common triggers include colds, smoke, air pollution, dust, pollen, mold, pets, cockroaches, stress, and cold air. · Avoid colds and the flu. Get a pneumococcal vaccine shot. If you have had one before, ask your doctor if you need a second dose. Get a flu vaccine every fall. If you must be around people with colds or the flu, wash your hands often. When should you call for help? Call 911 anytime you think you may need emergency care. For example, call if:  ? · You have severe trouble breathing. ?Call your doctor now or seek immediate medical care if:  ? · Your symptoms do not get better after you have followed your asthma action plan. ? · You have new or worse trouble breathing. ? · Your coughing and wheezing get worse. ? · You cough up dark brown or bloody mucus (sputum). ? · You have a new or higher fever. ? Watch closely for changes in your health, and be sure to contact your doctor if:  ? · You need to use quick-relief medicine on more than 2 days a week (unless it is just for exercise). ? · You cough more deeply or more often, especially if you notice more mucus or a change in the color of your mucus. ? · You are not getting better as expected. Where can you learn more? Go to http://evangelina-anne-marie.info/. Enter E697 in the search box to learn more about \"Asthma Attack: Care Instructions. \"  Current as of: May 12, 2017  Content Version: 11.4  © 7299-6340 [employer]. Care instructions adapted under license by [employer] (which disclaims liability or warranty for this information). If you have questions about a medical condition or this instruction, always ask your healthcare professional. [address] any warranty or liability for your use of this information.

## 2018-05-30 NOTE — ED TRIAGE NOTES
C/o wheezing states ran out of rescue inhaler today.  Pt ambulatory to triage able to speak in full complete sentences

## 2018-05-30 NOTE — ED NOTES
Pt discharged per MD order. Pt verbalized understanding of discharge instructions and follow up care. Prescription education given. Work note provided. Pt ambulated independently out of ED.

## 2018-05-30 NOTE — LETTER
[address] [place] [address] Dr [facility] EMERGENCY DEPT 
[address] [facility] [address] 
[phone] Work/School Note Date: 5/30/2018 To Whom It May concern: 
 
Amna Peres was seen and treated today in the emergency room by the following provider(s): 
Attending Provider: Deb Ray DO Physician Assistant: Lillian Snowden. Amna Peres may return to work on 6/1/18. Sincerely, 
 
 
 
 
Lillian Snowden

## 2018-05-30 NOTE — ED PROVIDER NOTES
EMERGENCY DEPARTMENT HISTORY AND PHYSICAL EXAM    3:36 PM      Date: 5/30/2018  Patient Name: Dimple Hyatt    History of Presenting Illness     Chief Complaint   Patient presents with    Wheezing         History Provided By: Patient    Chief Complaint: Wheezing and non productive cough  Duration: 1.5 days Days  Timing:  Intermittent and Progressive  Location: Chest   Quality: Pressure and Tightness  Severity: Moderate  Modifying Factors: Albuterol and tylenol  Associated Symptoms: Chills, sinus congestion, HA      Additional History (Context): Dimple Hyatt is a 32 y.o. male with asthma, PE, and former smoker who presents with wheezing and non productive cough. Pt states they has had a non productive cough for 1.5 days and began to have symptoms of wheezing that started last night. Pt has had associated symptoms of chills, sinus congestion, and headache. Pt notes the symptoms feel typical of previous asthma exacerbations. Pt denies any sick contacts and last smoked tobacco \"two weeks ago\". Pt states he ran out of his rescue inhaler at home. Pt denies: fever, sore throat, chest pain, dyspnea, rhinorrhea, otalgia, flank pain, and abdominal pain. PCP: Darrius Anna NP    Current Outpatient Prescriptions   Medication Sig Dispense Refill    albuterol (PROVENTIL HFA, VENTOLIN HFA, PROAIR HFA) 90 mcg/actuation inhaler Take 2 Puffs by inhalation every four (4) hours as needed for Wheezing. 1 Inhaler 0    albuterol (PROVENTIL VENTOLIN) 2.5 mg /3 mL (0.083 %) nebulizer solution 3 mL by Nebulization route every four (4) hours as needed for Wheezing. 30 Each 0    predniSONE (DELTASONE) 50 mg tablet Take 1 Tab by mouth daily for 5 days.  5 Tab 0       Past History     Past Medical History:  Past Medical History:   Diagnosis Date    Abnormal TSH 11/16/2017    Asthma     MDD (major depressive disorder), recurrent severe, without psychosis ([facility] [address] 11/16/2017    Thromboembolus ([facility] [address] 09/2017       Past Surgical History:  No past surgical history on file. Family History:  Family History   Problem Relation Age of Onset    No Known Problems Mother     Liver Disease Father        Social History:  Social History   Substance Use Topics    Smoking status: Former Smoker     Types: Cigarettes    Smokeless tobacco: Never Used    Alcohol use No       Allergies:  No Known Allergies      Review of Systems       Review of Systems   Constitutional: Positive for chills. Negative for fever. Respiratory: Positive for cough and wheezing. Negative for shortness of breath. Cardiovascular: Negative for chest pain, palpitations and leg swelling. Gastrointestinal: Negative for abdominal pain, nausea and vomiting. Skin: Negative for rash. Neurological: Negative for weakness. All other systems reviewed and are negative. Physical Exam     Visit Vitals    BP (!) 141/92 (BP 1 Location: Left arm, BP Patient Position: At rest)    Pulse (!) 112    Temp 98.1 °F (36.7 °C)    Resp 19    SpO2 98%         Physical Exam   Constitutional: He appears well-developed and well-nourished. No distress. HENT:   Head: Normocephalic and atraumatic. Right Ear: Tympanic membrane, external ear and ear canal normal.   Left Ear: Tympanic membrane, external ear and ear canal normal.   Nose: Mucosal edema and rhinorrhea present. Right sinus exhibits no maxillary sinus tenderness and no frontal sinus tenderness. Left sinus exhibits no maxillary sinus tenderness and no frontal sinus tenderness. Mouth/Throat: Uvula is midline, oropharynx is clear and moist and mucous membranes are normal. No oropharyngeal exudate, posterior oropharyngeal edema, posterior oropharyngeal erythema or tonsillar abscesses. Neck: Normal range of motion. Neck supple. Cardiovascular: Normal rate, regular rhythm, normal heart sounds and intact distal pulses. Exam reveals no gallop and no friction rub. No murmur heard.   Pulmonary/Chest: Effort normal. No respiratory distress. He has wheezes (mild expiratory wheeze throughout lung fields). He has no rales. Neurological: He is alert. Skin: Skin is warm. No rash noted. He is not diaphoretic. Nursing note and vitals reviewed. Diagnostic Study Results     Labs -  No results found for this or any previous visit (from the past 12 hour(s)). Radiologic Studies -   XR CHEST PA LAT   Final Result        Impression:     Normal study             Medical Decision Making   I am the first provider for this patient. I reviewed the vital signs, available nursing notes, past medical history, past surgical history, family history and social history. Vital Signs-Reviewed the patient's vital signs. Pulse Oximetry Analysis -  97 on room air     Records Reviewed: Nursing Notes and Old Medical Records (Time of Review: 3:36 PM)    ED Course: Progress Notes, Reevaluation, and Consults:  4:33 PM: Wheezing resolved. Pt notes he is feeling better. Blood pressure reduced to 141/92. Discussed need for close outpatient follow-up and strict return precautions for any new, worsening, or persistent symptoms including chest pain, shortness of breath, or any other medical concerns. Provider Notes (Medical Decision Making): 33 yo M with hx of asthma who presents due to dry cough and wheezing x 2 days. Afebrile, looks well. Wheezing resolved with 2 duonebs and prednisone. No tachypnea, 98% on room air. Tachycardia likely 2/2 albuterol treatments. CXR without acute process. Stable for d/c with outpatient follow-up. Diagnosis     Clinical Impression:   1. Mild asthma without complication, unspecified whether persistent    2. Elevated blood pressure reading    3.  Acute upper respiratory infection        Disposition: home     Follow-up Information     Follow up With Details Comments Contact Info    [facility] EMERGENCY DEPT  If symptoms worsen [address] [place] [id number]  [phone]    Jacquelyn Lozano NP In 2 days [address]  [phone]             Patient's Medications   Start Taking    ALBUTEROL (PROVENTIL HFA, VENTOLIN HFA, PROAIR HFA) 90 MCG/ACTUATION INHALER    Take 2 Puffs by inhalation every four (4) hours as needed for Wheezing. ALBUTEROL (PROVENTIL VENTOLIN) 2.5 MG /3 ML (0.083 %) NEBULIZER SOLUTION    3 mL by Nebulization route every four (4) hours as needed for Wheezing. PREDNISONE (DELTASONE) 50 MG TABLET    Take 1 Tab by mouth daily for 5 days. Continue Taking    No medications on file   These Medications have changed    No medications on file   Stop Taking    HYDROCODONE-ACETAMINOPHEN (NORCO) 5-325 MG PER TABLET    Take 1 Tab by mouth every four (4) hours as needed for Pain. Max Daily Amount: 6 Tabs. NAPROXEN (NAPROSYN) 500 MG TABLET    Take 1 Tab by mouth two (2) times daily (with meals) for 10 days.

## 2018-06-29 ENCOUNTER — TELEPHONE (OUTPATIENT)
Dept: FAMILY MEDICINE CLINIC | Age: 28
End: 2018-06-29

## 2018-06-29 NOTE — TELEPHONE ENCOUNTER
----- Message from Mitchel Schneider MD sent at 6/25/2018 11:32 AM EDT -----  Regarding: RE: Med refill  The only cheaper med is coumadin and we cant manage that med. He never brought back his pap application so he should bring it when he comes. He also was supposed to follow up with vascular or pulm to see if he still needs to take anticoagulation. He should keep the appt with us but bring his financial info to the visit and also if he wants to come  a sample of eliquis in the meantime he can. I am not sure if we have one on the [facility]  ----- Message -----     From: Any Lagos     Sent: 6/25/2018   9:21 AM       To: Mitchel Schneider MD  Subject: Med refill                                       Pt ran out of his Eliquis and needs a cheaper alternative medication. Pt stated he followed up with [facility] [place] [id number] [facility] but they were unable to see him because he sees us. Follow up appt scheduled for 07/02/18.

## 2018-11-02 ENCOUNTER — APPOINTMENT (OUTPATIENT)
Dept: GENERAL RADIOLOGY | Age: 28
End: 2018-11-02
Attending: PHYSICIAN ASSISTANT
Payer: SELF-PAY

## 2018-11-02 ENCOUNTER — HOSPITAL ENCOUNTER (EMERGENCY)
Age: 28
Discharge: HOME | End: 2018-11-02
Attending: EMERGENCY MEDICINE
Payer: SELF-PAY

## 2018-11-02 VITALS
WEIGHT: 315 LBS | TEMPERATURE: 98.1 F | DIASTOLIC BLOOD PRESSURE: 86 MMHG | HEART RATE: 99 BPM | HEIGHT: 76 IN | SYSTOLIC BLOOD PRESSURE: 133 MMHG | BODY MASS INDEX: 38.36 KG/M2 | RESPIRATION RATE: 18 BRPM | OXYGEN SATURATION: 96 %

## 2018-11-02 DIAGNOSIS — Z87.09 HISTORY OF ASTHMA: ICD-10-CM

## 2018-11-02 DIAGNOSIS — R06.2 WHEEZING: ICD-10-CM

## 2018-11-02 DIAGNOSIS — R05.9 COUGH: Primary | ICD-10-CM

## 2018-11-02 LAB
ATRIAL RATE: 88 BPM
CALCULATED P AXIS, ECG09: 50 DEGREES
CALCULATED R AXIS, ECG10: 60 DEGREES
CALCULATED T AXIS, ECG11: 39 DEGREES
DIAGNOSIS, 93000: NORMAL
P-R INTERVAL, ECG05: 150 MS
Q-T INTERVAL, ECG07: 360 MS
QRS DURATION, ECG06: 98 MS
QTC CALCULATION (BEZET), ECG08: 435 MS
VENTRICULAR RATE, ECG03: 88 BPM

## 2018-11-02 PROCEDURE — 71046 X-RAY EXAM CHEST 2 VIEWS: CPT

## 2018-11-02 PROCEDURE — 74011636637 HC RX REV CODE- 636/637: Performed by: PHYSICIAN ASSISTANT

## 2018-11-02 PROCEDURE — 77030029684 HC NEB SM VOL KT MONA -A

## 2018-11-02 PROCEDURE — 93005 ELECTROCARDIOGRAM TRACING: CPT

## 2018-11-02 PROCEDURE — 99283 EMERGENCY DEPT VISIT LOW MDM: CPT

## 2018-11-02 PROCEDURE — 94640 AIRWAY INHALATION TREATMENT: CPT

## 2018-11-02 PROCEDURE — 74011000250 HC RX REV CODE- 250: Performed by: PHYSICIAN ASSISTANT

## 2018-11-02 RX ORDER — PREDNISONE 20 MG/1
60 TABLET ORAL
Status: COMPLETED | OUTPATIENT
Start: 2018-11-02 | End: 2018-11-02

## 2018-11-02 RX ORDER — IPRATROPIUM BROMIDE AND ALBUTEROL SULFATE 2.5; .5 MG/3ML; MG/3ML
3 SOLUTION RESPIRATORY (INHALATION)
Status: COMPLETED | OUTPATIENT
Start: 2018-11-02 | End: 2018-11-02

## 2018-11-02 RX ORDER — PREDNISONE 10 MG/1
TABLET ORAL
Qty: 21 TAB | Refills: 0 | Status: SHIPPED | OUTPATIENT
Start: 2018-11-02 | End: 2019-02-01

## 2018-11-02 RX ADMIN — IPRATROPIUM BROMIDE AND ALBUTEROL SULFATE 3 ML: .5; 3 SOLUTION RESPIRATORY (INHALATION) at 13:37

## 2018-11-02 RX ADMIN — PREDNISONE 60 MG: 20 TABLET ORAL at 13:36

## 2018-11-02 NOTE — ED PROVIDER NOTES
EMERGENCY DEPARTMENT HISTORY AND PHYSICAL EXAM 
 
Date: 11/2/2018 Patient Name: Katey Hunt History of Presenting Illness Chief Complaint Patient presents with  Cough  Hemoptysis History Provided By: Patient Chief Complaint: Cough Duration: 2 Days Timing:  Constant Location: throat/mouth Quality: productive Severity: Moderate Modifying Factors: inhaler with little relief Associated Symptoms: rhinorrhea, fever, night sweats Additional History (Context):  
Katey Hunt is a 29 y.o. male with PMHX of Asthma who presents to the emergency department C/O constant moderate productive cough onset about 2 days ago. Associated sxs include rhinorrhea, fever 2 nights ago but none since, and night sweats 2 nights ago with the fever and none since. Pt denies sore throat, weight loss, and any other sxs or complaints. He states this morning he start coughing up dark sputum that turned to clear with repeat coughing. Denies bright red blood noted. He reports using his inhaler with little relief. He reports he has not had any sick contacts or contact with TB patients, but has had exposure to homeless shelter. He states he is eating and drinking fine, and stopped smoking 2 months ago. PCP: Edinson Angel MD 
 
Current Outpatient Medications Medication Sig Dispense Refill  predniSONE (STERAPRED DS) 10 mg dose pack Take according to box instructions. 21 Tab 0  
 butalbital-acetaminophen-caff (FIORICET) -40 mg per capsule Take 1 Cap by mouth every four (4) hours as needed for Pain. 15 Cap 0  
 albuterol (PROVENTIL HFA, VENTOLIN HFA, PROAIR HFA) 90 mcg/actuation inhaler Take 2 Puffs by inhalation every four (4) hours as needed for Wheezing. 1 Inhaler 0  
 albuterol (PROVENTIL VENTOLIN) 2.5 mg /3 mL (0.083 %) nebulizer solution 3 mL by Nebulization route every four (4) hours as needed for Wheezing. 30 Each 0 Past History Past Medical History: 
Past Medical History: Diagnosis Date  Abnormal TSH 11/16/2017  Asthma  MDD (major depressive disorder), recurrent severe, without psychosis ([facility] [address] 11/16/2017  Thromboembolus ([facility] [address] 09/2017 Past Surgical History: No past surgical history on file. Family History: 
Family History Problem Relation Age of Onset  No Known Problems Mother  Liver Disease Father Social History: 
Social History Tobacco Use  Smoking status: Former Smoker Types: Cigarettes  Smokeless tobacco: Never Used Substance Use Topics  Alcohol use: No  
 Drug use: No  
 
 
Allergies: 
No Known Allergies Review of Systems Review of Systems Constitutional: Positive for diaphoresis ( 2 night ago, with fever only.) and fever ( resolved). Negative for appetite change and unexpected weight change. HENT: Positive for rhinorrhea. Negative for sore throat. Respiratory: Positive for cough (Productive) and wheezing. All other systems reviewed and are negative. Physical Exam  
 
Vitals:  
 11/02/18 1214 11/02/18 1416 BP: 133/86 Pulse: 93 99 Resp: 18 Temp: 98.1 °F (36.7 °C) SpO2: 95% 96% Weight: 145.2 kg (320 lb) Height: 6' 4\" (1.93 m) Physical Exam  
Constitutional: He appears well-developed and well-nourished. No distress. HENT:  
Head: Normocephalic and atraumatic. Right Ear: External ear normal.  
Left Ear: External ear normal.  
Nose: Nose normal.  
Mouth/Throat: Oropharynx is clear and moist. No oropharyngeal exudate. Eyes: Conjunctivae are normal.  
Neck: Normal range of motion. Cardiovascular: Normal rate, regular rhythm and normal heart sounds. Pulmonary/Chest: Effort normal. No respiratory distress. He has wheezes. He has no rales. Wheezing heard throughout all lungs fields anteriorly and posteriorly. Speaking in full sentences, NAD. Neurological: He is alert. Skin: Skin is warm and dry. He is not diaphoretic. Psychiatric: He has a normal mood and affect. Vitals reviewed. Diagnostic Study Results Labs - Recent Results (from the past 12 hour(s)) EKG, 12 LEAD, INITIAL Collection Time: 11/02/18 12:43 PM  
Result Value Ref Range Ventricular Rate 88 BPM  
 Atrial Rate 88 BPM  
 P-R Interval 150 ms QRS Duration 98 ms Q-T Interval 360 ms QTC Calculation (Bezet) 435 ms Calculated P Axis 50 degrees Calculated R Axis 60 degrees Calculated T Axis 39 degrees Diagnosis Normal sinus rhythm Normal ECG When compared with ECG of 11-NOV-2017 15:38, No significant change was found Confirmed by Tonny Christine MD, Jonah Lyle ([id number]) on 11/2/2018 1:11:06 PM 
  
 
 
Radiologic Studies -  
XR CHEST PA LAT Final Result IMPRESSION: 
1. No acute cardiopulmonary process. No change. CT Results  (Last 48 hours) None CXR Results  (Last 48 hours) 11/02/18 1224  XR CHEST PA LAT Final result Impression:   IMPRESSION:  
1. No acute cardiopulmonary process. No change. Narrative:  HISTORY: Cough for 3 days. Exam: Chest.  
   
Technique: Two views of the chest were obtained. Compared to 5/30/2018. FINDINGS: There is no pneumothorax, pneumonia or pleural effusions. Heart and  
mediastinal structures are unremarkable. Visualized bony thorax and soft tissues  
are within normal limits. Medications given in the ED- Medications  
albuterol-ipratropium (DUO-NEB) 2.5 MG-0.5 MG/3 ML (3 mL Nebulization Given 11/2/18 1337) predniSONE (DELTASONE) tablet 60 mg (60 mg Oral Given 11/2/18 1336) Medical Decision Making I am the first provider for this patient. I reviewed the vital signs, available nursing notes, past medical history, past surgical history, family history and social history. Vital Signs-Reviewed the patient's vital signs. Records Reviewed: Nursing Notes and Old Medical Records Provider Notes (Medical Decision Making): Appears well and non-toxic, presenting with subj fever, cough and dark sputum that turned clear. Wheezing noted throughout. CXR neg for acute process per radiology interpretation. Afebrile here without anti-pyretic. Will give breathing tx here, plan to discharge on steroids. Based on today's assessment, I feel the patient is stable for discharge to home with outpatient follow up. Return precautions and referrals provided. Procedures: 
Procedures 2:07 PM Pt reassessed after steroids and breathing treatment. Lungs are CTAB. Pt states he feels much improved. Speaking in full sentences in NAD, ambulating without issues. Discussed xray results. Will discharge to home with steroids. States he has plenty of inhaler left. Diagnosis and Disposition DISCHARGE NOTE: 
 
Gladys Flood's  results have been reviewed with him. He has been counseled regarding his diagnosis, treatment, and plan. He verbally conveys understanding and agreement of the signs, symptoms, diagnosis, treatment and prognosis and additionally agrees to follow up as discussed. He also agrees with the care-plan and conveys that all of his questions have been answered. I have also provided discharge instructions for him that include: educational information regarding their diagnosis and treatment, and list of reasons why they would want to return to the ED prior to their follow-up appointment, should his condition change. He has been provided with education for proper emergency department utilization. CLINICAL IMPRESSION: 
 
1. Cough 2. Wheezing 3. History of asthma PLAN: 
1. D/C Home 2. Discharge Medication List as of 11/2/2018  2:10 PM  
  
START taking these medications Details  
predniSONE (STERAPRED DS) 10 mg dose pack Take according to box instructions. , Print, Disp-21 Tab, R-0  
  
  
CONTINUE these medications which have NOT CHANGED  Details  
butalbital-acetaminophen-caff (FIORICET) -40 mg per capsule Take 1 Cap by mouth every four (4) hours as needed for Pain., Print, Disp-15 Cap, R-0  
  
albuterol (PROVENTIL HFA, VENTOLIN HFA, PROAIR HFA) 90 mcg/actuation inhaler Take 2 Puffs by inhalation every four (4) hours as needed for Wheezing., Print, Disp-1 Inhaler, R-0  
  
albuterol (PROVENTIL VENTOLIN) 2.5 mg /3 mL (0.083 %) nebulizer solution 3 mL by Nebulization route every four (4) hours as needed for Wheezing., Print, Disp-30 Each, R-0  
  
  
 
3. Follow-up Information Follow up With Specialties Details Why Contact Info [facility]  Schedule an appointment as soon as possible for a visit  [facility] [address] [place] [address] [facility] EMERGENCY DEPT Emergency Medicine  As needed, If symptoms worsen [facility]  [phone]

## 2018-11-02 NOTE — LETTER
[address] [place] [address] [facility] EMERGENCY DEPT 
[address] [facility] [id number] [phone] Work/School Note Date: 11/2/2018 To Whom It May concern: 
 
Florin Malone was seen and treated today in the emergency room by the following provider(s): 
Attending Provider: Radha Clancy DO Physician Assistant: Otto Krause PA-C. Florin Malone may return to work on 11/3/18. Sincerely, eNla Durbin PA-C

## 2018-11-02 NOTE — ED TRIAGE NOTES
Pt arrived c/o cough x2-3 days, states approximately 1 hour ago he coughed up some bright blood and stated it was copious in amount, a&ox4, ambulatory, denies CP and SOB, hx of copd and asthma

## 2018-11-02 NOTE — DISCHARGE INSTRUCTIONS
Wheezing or Bronchoconstriction: Care Instructions  Your Care Instructions  Wheezing is a whistling noise made during breathing. It occurs when the small airways, or bronchial tubes, that lead to your lungs swell or contract (spasm) and become narrow. This narrowing is called bronchoconstriction. When your airways constrict, it is hard for air to pass through and this makes it hard for you to breathe. Wheezing and bronchoconstriction can be caused by many problems, including:  · An infection such as the flu or a cold. · Allergies such as hay fever. · Diseases such as asthma or chronic obstructive pulmonary disease. · Smoking. Treatment for your wheezing depends on what is causing the problem. Your wheezing may get better without treatment. But you may need to pay attention to things that cause your wheezing and avoid them. Or you may need medicine to help treat the wheezing and to reduce the swelling or to relieve spasms in your lungs. Follow-up care is a key part of your treatment and safety. Be sure to make and go to all appointments, and call your doctor if you are having problems. It is also a good idea to know your test results and keep a list of the medicines you take. How can you care for yourself at home? · Take your medicine exactly as prescribed. Call your doctor if you think you are having a problem with your medicine. You will get more details on the specific medicine your doctor prescribes. · If your doctor prescribed antibiotics, take them as directed. Do not stop taking them just because you feel better. You need to take the full course of antibiotics. · Breathe moist air from a humidifier, hot shower, or sink filled with hot water. This may help ease your symptoms and make it easier for you to breathe. · If you have congestion in your nose and throat, drinking plenty of fluids, especially hot fluids, may help relieve your symptoms.  If you have kidney, heart, or liver disease and have to limit fluids, talk with your doctor before you increase the amount of fluids you drink. · If you have mucus in your airways, it may help to breathe deeply and cough. · Do not smoke or allow others to smoke around you. Smoking can make your wheezing worse. If you need help quitting, talk to your doctor about stop-smoking programs and medicines. These can increase your chances of quitting for good. · Avoid things that may cause your wheezing. These may include colds, smoke, air pollution, dust, pollen, pets, cockroaches, stress, and cold air. When should you call for help? Call 911 anytime you think you may need emergency care. For example, call if:    · You have severe trouble breathing.     · You passed out (lost consciousness).    Call your doctor now or seek immediate medical care if:    · You cough up yellow, dark brown, or bloody mucus (sputum).     · You have new or worse shortness of breath.     · Your wheezing is not getting better or it gets worse after you start taking your medicine.    Watch closely for changes in your health, and be sure to contact your doctor if:    · You do not get better as expected. Where can you learn more? Go to http://evangelina-anne-marie.info/. Enter 454 3410 in the search box to learn more about \"Wheezing or Bronchoconstriction: Care Instructions. \"  Current as of: December 6, 2017  Content Version: 11.8  © 8422-7736 [employer]. Care instructions adapted under license by [employer] (which disclaims liability or warranty for this information). If you have questions about a medical condition or this instruction, always ask your healthcare professional. [place] [address] any warranty or liability for your use of this information. 1. Steroids as prescribed. 2. Inhaler as needed. 3. Tylenol or motrin if fever returns.   4. Return for persistent or worsening symptoms, shortness of breath, difficulty breathing, or any other concerns. Cough: Care Instructions  Your Care Instructions    A cough is your body's response to something that bothers your throat or airways. Many things can cause a cough. You might cough because of a cold or the flu, bronchitis, or asthma. Smoking, postnasal drip, allergies, and stomach acid that backs up into your throat also can cause coughs. A cough is a symptom, not a disease. Most coughs stop when the cause, such as a cold, goes away. You can take a few steps at home to cough less and feel better. Follow-up care is a key part of your treatment and safety. Be sure to make and go to all appointments, and call your doctor if you are having problems. It's also a good idea to know your test results and keep a list of the medicines you take. How can you care for yourself at home? · Drink lots of water and other fluids. This helps thin the mucus and soothes a dry or sore throat. Honey or lemon juice in hot water or tea may ease a dry cough. · Take cough medicine as directed by your doctor. · Prop up your head on pillows to help you breathe and ease a dry cough. · Try cough drops to soothe a dry or sore throat. Cough drops don't stop a cough. Medicine-flavored cough drops are no better than candy-flavored drops or hard candy. · Do not smoke. Avoid secondhand smoke. If you need help quitting, talk to your doctor about stop-smoking programs and medicines. These can increase your chances of quitting for good. When should you call for help? Call 911 anytime you think you may need emergency care.  For example, call if:    · You have severe trouble breathing.    Call your doctor now or seek immediate medical care if:    · You cough up blood.     · You have new or worse trouble breathing.     · You have a new or higher fever.     · You have a new rash.    Watch closely for changes in your health, and be sure to contact your doctor if:    · You cough more deeply or more often, especially if you notice more mucus or a change in the color of your mucus.     · You have new symptoms, such as a sore throat, an earache, or sinus pain.     · You do not get better as expected. Where can you learn more? Go to http://evangelina-anne-marie.info/. Enter D279 in the search box to learn more about \"Cough: Care Instructions. \"  Current as of: December 6, 2017  Content Version: 11.8  © 9630-9918 [employer]. Care instructions adapted under license by [employer] (which disclaims liability or warranty for this information). If you have questions about a medical condition or this instruction, always ask your healthcare professional. [place] [address] any warranty or liability for your use of this information.

## 2018-11-03 ENCOUNTER — TELEPHONE (OUTPATIENT)
Dept: FAMILY MEDICINE CLINIC | Age: 28
End: 2018-11-03

## 2018-11-03 NOTE — TELEPHONE ENCOUNTER
Can you call him and let him know he needs to call [facility]iki to have his eliquis renewed. He had another blood clot in June and should be on these mediicnes until the lung doctors tell him to stop taking them     He should follow up with us as well, no showed his last appt      ===View-only below this line===    ----- Message -----  From: Charisse Cao  Sent: 6/25/2018   9:21 AM  To: Javon Hoffmann MD  Subject: Med refill                                       Pt ran out of his Eliquis and needs a cheaper alternative medication. Pt stated he followed up with [facility] [place] [id number] [facility] but they were unable to see him because he sees us. Follow up appt scheduled for 07/02/18.

## 2018-11-08 NOTE — TELEPHONE ENCOUNTER
Attempted to call patient Mobile number. Mobile number was the wrong number. Also attempted to call patient home number and emergency contact. Left message on both numbers along with office number for patient to give the office a call back.

## 2019-01-31 ENCOUNTER — HOSPITAL ENCOUNTER (EMERGENCY)
Age: 29
Discharge: HOME | End: 2019-02-01
Attending: EMERGENCY MEDICINE
Payer: MEDICAID

## 2019-01-31 ENCOUNTER — APPOINTMENT (OUTPATIENT)
Dept: GENERAL RADIOLOGY | Age: 29
End: 2019-01-31
Attending: EMERGENCY MEDICINE
Payer: MEDICAID

## 2019-01-31 DIAGNOSIS — J45.901 MILD ASTHMA WITH ACUTE EXACERBATION, UNSPECIFIED WHETHER PERSISTENT: ICD-10-CM

## 2019-01-31 DIAGNOSIS — R06.02 SOB (SHORTNESS OF BREATH): Primary | ICD-10-CM

## 2019-01-31 LAB
ALBUMIN SERPL-MCNC: 3.2 G/DL (ref 3.4–5)
ALBUMIN/GLOB SERPL: 1.1 {RATIO} (ref 0.8–1.7)
ALP SERPL-CCNC: 41 U/L (ref 45–117)
ALT SERPL-CCNC: 19 U/L (ref 16–61)
ANION GAP SERPL CALC-SCNC: 4 MMOL/L (ref 3–18)
AST SERPL-CCNC: 19 U/L (ref 15–37)
BASOPHILS # BLD: 0 K/UL (ref 0–0.1)
BASOPHILS NFR BLD: 1 % (ref 0–2)
BILIRUB SERPL-MCNC: 0.7 MG/DL (ref 0.2–1)
BUN SERPL-MCNC: 15 MG/DL (ref 7–18)
BUN/CREAT SERPL: 17 (ref 12–20)
CALCIUM SERPL-MCNC: 8 MG/DL (ref 8.5–10.1)
CHLORIDE SERPL-SCNC: 108 MMOL/L (ref 100–108)
CO2 SERPL-SCNC: 29 MMOL/L (ref 21–32)
CREAT SERPL-MCNC: 0.9 MG/DL (ref 0.6–1.3)
DIFFERENTIAL METHOD BLD: ABNORMAL
EOSINOPHIL # BLD: 0.3 K/UL (ref 0–0.4)
EOSINOPHIL NFR BLD: 4 % (ref 0–5)
ERYTHROCYTE [DISTWIDTH] IN BLOOD BY AUTOMATED COUNT: 14.6 % (ref 11.6–14.5)
GLOBULIN SER CALC-MCNC: 3 G/DL (ref 2–4)
GLUCOSE SERPL-MCNC: 113 MG/DL (ref 74–99)
HCT VFR BLD AUTO: 42.7 % (ref 36–48)
HGB BLD-MCNC: 15.1 G/DL (ref 13–16)
LYMPHOCYTES # BLD: 3.7 K/UL (ref 0.9–3.6)
LYMPHOCYTES NFR BLD: 48 % (ref 21–52)
MCH RBC QN AUTO: 28 PG (ref 24–34)
MCHC RBC AUTO-ENTMCNC: 35.4 G/DL (ref 31–37)
MCV RBC AUTO: 79.1 FL (ref 74–97)
MONOCYTES # BLD: 0.4 K/UL (ref 0.05–1.2)
MONOCYTES NFR BLD: 5 % (ref 3–10)
NEUTS SEG # BLD: 3.1 K/UL (ref 1.8–8)
NEUTS SEG NFR BLD: 42 % (ref 40–73)
PLATELET # BLD AUTO: 220 K/UL (ref 135–420)
PMV BLD AUTO: 9.3 FL (ref 9.2–11.8)
POTASSIUM SERPL-SCNC: 4 MMOL/L (ref 3.5–5.5)
PROT SERPL-MCNC: 6.2 G/DL (ref 6.4–8.2)
RBC # BLD AUTO: 5.4 M/UL (ref 4.7–5.5)
SODIUM SERPL-SCNC: 141 MMOL/L (ref 136–145)
WBC # BLD AUTO: 7.5 K/UL (ref 4.6–13.2)

## 2019-01-31 PROCEDURE — 93005 ELECTROCARDIOGRAM TRACING: CPT

## 2019-01-31 PROCEDURE — 85025 COMPLETE CBC W/AUTO DIFF WBC: CPT

## 2019-01-31 PROCEDURE — 94640 AIRWAY INHALATION TREATMENT: CPT

## 2019-01-31 PROCEDURE — 71045 X-RAY EXAM CHEST 1 VIEW: CPT

## 2019-01-31 PROCEDURE — 99284 EMERGENCY DEPT VISIT MOD MDM: CPT

## 2019-01-31 PROCEDURE — 74011250636 HC RX REV CODE- 250/636: Performed by: EMERGENCY MEDICINE

## 2019-01-31 PROCEDURE — 80053 COMPREHEN METABOLIC PANEL: CPT

## 2019-01-31 PROCEDURE — 74011000250 HC RX REV CODE- 250: Performed by: EMERGENCY MEDICINE

## 2019-01-31 PROCEDURE — 96374 THER/PROPH/DIAG INJ IV PUSH: CPT

## 2019-01-31 RX ORDER — IPRATROPIUM BROMIDE AND ALBUTEROL SULFATE 2.5; .5 MG/3ML; MG/3ML
3 SOLUTION RESPIRATORY (INHALATION)
Status: COMPLETED | OUTPATIENT
Start: 2019-01-31 | End: 2019-01-31

## 2019-01-31 RX ADMIN — METHYLPREDNISOLONE SODIUM SUCCINATE 125 MG: 125 INJECTION, POWDER, FOR SOLUTION INTRAMUSCULAR; INTRAVENOUS at 23:53

## 2019-01-31 RX ADMIN — IPRATROPIUM BROMIDE AND ALBUTEROL SULFATE 3 ML: .5; 3 SOLUTION RESPIRATORY (INHALATION) at 23:54

## 2019-02-01 VITALS
TEMPERATURE: 98.4 F | HEART RATE: 93 BPM | RESPIRATION RATE: 18 BRPM | DIASTOLIC BLOOD PRESSURE: 99 MMHG | OXYGEN SATURATION: 99 % | SYSTOLIC BLOOD PRESSURE: 164 MMHG

## 2019-02-01 LAB
ATRIAL RATE: 86 BPM
CALCULATED P AXIS, ECG09: 31 DEGREES
CALCULATED R AXIS, ECG10: 63 DEGREES
CALCULATED T AXIS, ECG11: 31 DEGREES
DIAGNOSIS, 93000: NORMAL
P-R INTERVAL, ECG05: 152 MS
Q-T INTERVAL, ECG07: 360 MS
QRS DURATION, ECG06: 96 MS
QTC CALCULATION (BEZET), ECG08: 430 MS
VENTRICULAR RATE, ECG03: 86 BPM

## 2019-02-01 RX ORDER — PREDNISONE 10 MG/1
TABLET ORAL
Qty: 21 TAB | Refills: 0 | Status: SHIPPED | OUTPATIENT
Start: 2019-02-01

## 2019-02-01 NOTE — ED NOTES
I have reviewed discharge instructions with the patient. The patient verbalized understanding. Patient looks comfortable, left ED in stable condition. Patient denies SOB at this time. Ambulatory, steady gait noted.

## 2019-02-01 NOTE — ED PROVIDER NOTES
EMERGENCY DEPARTMENT HISTORY AND PHYSICAL EXAM 
 
11:06 PM 
 
 
Date: 1/31/2019 Patient Name: Lea Shields History of Presenting Illness Chief Complaint Patient presents with  Shortness of Breath  Leg Pain History Provided By: Patient Chief Complaint: Shortness of breath Duration:  2 Hours Timing:  Constant Location: Chest 
Quality: Short of breath Severity: Moderate Modifying Factors: Not improved by home Albuterol inhaler Associated Symptoms: Bilateral leg pain onset 2 days ago. Patient denies CP, cough, congestion, recent sick contacts, recent long distance travels, and any other associated symptoms or complaint Additional History (Context): Lea Shields is a 29 y.o. male with a past medical history of asthma and thromboembolu who presents with c/o shortness of breath onset 2 hours ago. Patient describes the shortness of breath as constant and moderate. He used at home Albuterol inhaler but without improvement. He also c/o bilateral leg pain onset 2 days ago. He states that he has a history of DVT 1 year ago. He is currently taking Eliquis. Patient denies CP, cough, congestion, recent sick contacts, recent long distance travels, and any other associated symptoms or complaint PCP: Edinson Angel MD 
 
Current Outpatient Medications Medication Sig Dispense Refill  predniSONE (STERAPRED DS) 10 mg dose pack As directed 21 Tab 0  
 butalbital-acetaminophen-caff (FIORICET) -40 mg per capsule Take 1 Cap by mouth every four (4) hours as needed for Pain. 15 Cap 0  
 albuterol (PROVENTIL HFA, VENTOLIN HFA, PROAIR HFA) 90 mcg/actuation inhaler Take 2 Puffs by inhalation every four (4) hours as needed for Wheezing. 1 Inhaler 0  
 albuterol (PROVENTIL VENTOLIN) 2.5 mg /3 mL (0.083 %) nebulizer solution 3 mL by Nebulization route every four (4) hours as needed for Wheezing. 30 Each 0 Past History Past Medical History: 
Past Medical History: Diagnosis Date  Abnormal TSH 11/16/2017  Asthma  MDD (major depressive disorder), recurrent severe, without psychosis ([facility] [address] 11/16/2017  Thromboembolus ([facility] 09/2017 Past Surgical History: No past surgical history on file. Family History: 
Family History Problem Relation Age of Onset  No Known Problems Mother  Liver Disease Father Social History: 
Social History Tobacco Use  Smoking status: Former Smoker Types: Cigarettes  Smokeless tobacco: Never Used Substance Use Topics  Alcohol use: No  
 Drug use: No  
 
 
Allergies: 
No Known Allergies Review of Systems Review of Systems Constitutional: Negative for activity change and appetite change. HENT: Negative for congestion. Eyes: Negative for visual disturbance. Respiratory: Positive for shortness of breath. Negative for cough. Cardiovascular: Negative for chest pain. Gastrointestinal: Negative for abdominal pain, diarrhea, nausea and vomiting. Genitourinary: Negative for dysuria. Musculoskeletal: Negative for arthralgias and myalgias. Bilateral leg pain. Skin: Negative for rash. Neurological: Negative for weakness and numbness. All other systems reviewed and are negative. Physical Exam  
 
Visit Vitals BP (!) 164/99 Pulse 93 Temp 98.4 °F (36.9 °C) Resp 18 SpO2 99% Physical Exam  
Constitutional: He is oriented to person, place, and time. He appears well-developed and well-nourished. HENT:  
Head: Normocephalic and atraumatic. Mouth/Throat: Oropharynx is clear and moist.  
Eyes: Conjunctivae are normal.  
Neck: Normal range of motion. Neck supple. No JVD present. Cardiovascular: Normal rate, regular rhythm, normal heart sounds and intact distal pulses. No murmur heard. Pulmonary/Chest: Effort normal. He has wheezes. Abdominal: Soft. Bowel sounds are normal. He exhibits no distension. There is no tenderness. Musculoskeletal: Normal range of motion. He exhibits no deformity. Lymphadenopathy:  
  He has no cervical adenopathy. Neurological: He is alert and oriented to person, place, and time. Coordination normal.  
Skin: Skin is warm and dry. No rash noted. Psychiatric: He has a normal mood and affect. Nursing note and vitals reviewed. Diagnostic Study Results Labs - Recent Results (from the past 12 hour(s)) EKG, 12 LEAD, INITIAL Collection Time: 01/31/19 10:58 PM  
Result Value Ref Range Ventricular Rate 86 BPM  
 Atrial Rate 86 BPM  
 P-R Interval 152 ms QRS Duration 96 ms  
 Q-T Interval 360 ms QTC Calculation (Bezet) 430 ms Calculated P Axis 31 degrees Calculated R Axis 63 degrees Calculated T Axis 31 degrees Diagnosis Normal sinus rhythm Septal infarct , age undetermined Abnormal ECG When compared with ECG of 02-NOV-2018 12:43, No significant change was found CBC WITH AUTOMATED DIFF Collection Time: 01/31/19 11:10 PM  
Result Value Ref Range WBC 7.5 4.6 - 13.2 K/uL  
 RBC 5.40 4.70 - 5.50 M/uL  
 HGB 15.1 13.0 - 16.0 g/dL HCT 42.7 36.0 - 48.0 % MCV 79.1 74.0 - 97.0 FL  
 MCH 28.0 24.0 - 34.0 PG  
 MCHC 35.4 31.0 - 37.0 g/dL  
 RDW 14.6 (H) 11.6 - 14.5 % PLATELET 200 007 - 374 K/uL MPV 9.3 9.2 - 11.8 FL  
 NEUTROPHILS 42 40 - 73 % LYMPHOCYTES 48 21 - 52 % MONOCYTES 5 3 - 10 % EOSINOPHILS 4 0 - 5 % BASOPHILS 1 0 - 2 %  
 ABS. NEUTROPHILS 3.1 1.8 - 8.0 K/UL  
 ABS. LYMPHOCYTES 3.7 (H) 0.9 - 3.6 K/UL  
 ABS. MONOCYTES 0.4 0.05 - 1.2 K/UL  
 ABS. EOSINOPHILS 0.3 0.0 - 0.4 K/UL  
 ABS. BASOPHILS 0.0 0.0 - 0.1 K/UL  
 DF AUTOMATED METABOLIC PANEL, COMPREHENSIVE Collection Time: 01/31/19 11:10 PM  
Result Value Ref Range Sodium 141 136 - 145 mmol/L Potassium 4.0 3.5 - 5.5 mmol/L Chloride 108 100 - 108 mmol/L  
 CO2 29 21 - 32 mmol/L Anion gap 4 3.0 - 18 mmol/L Glucose 113 (H) 74 - 99 mg/dL BUN 15 7.0 - 18 MG/DL Creatinine 0.90 0.6 - 1.3 MG/DL  
 BUN/Creatinine ratio 17 12 - 20 GFR est AA >60 >60 ml/min/1.73m2 GFR est non-AA >60 >60 ml/min/1.73m2 Calcium 8.0 (L) 8.5 - 10.1 MG/DL Bilirubin, total 0.7 0.2 - 1.0 MG/DL  
 ALT (SGPT) 19 16 - 61 U/L  
 AST (SGOT) 19 15 - 37 U/L Alk. phosphatase 41 (L) 45 - 117 U/L Protein, total 6.2 (L) 6.4 - 8.2 g/dL Albumin 3.2 (L) 3.4 - 5.0 g/dL Globulin 3.0 2.0 - 4.0 g/dL A-G Ratio 1.1 0.8 - 1.7 Radiologic Studies -  
XR CHEST PORT    (Results Pending) No results found. Medical Decision Making I am the first provider for this patient. I reviewed the vital signs, available nursing notes, past medical history, past surgical history, family history and social history. Vital Signs-Reviewed the patient's vital signs. Pulse Oximetry Analysis -  94% on room air (normal) EKG interpretation: 
Normal sinus rhythm, rate 86, , QTc 430. No acute ST or T wave changes, no STEMI. Records Reviewed: Nursing Notes (Time of Review: 11:06 PM) 
 
ED Course: Progress Notes, Reevaluation, and Consults: 
 
Provider Notes (Medical Decision Making): 29 y.o. male with a past medical history of asthma and thromboembolu who presents with c/o shortness of breath onset 2 hours ago. Patient describes the shortness of breath as constant and moderate. He used at home Albuterol inhaler but without improvement. He also c/o bilateral leg pain onset 2 days ago. He states that he has a history of DVT 1 year ago. He is currently taking Eliquis. No leg edema, but wheezing noted bilaterally. Not tachycardic. Differential Diagnosis: suspect mild asthma exacerbation, do not suspect new PE, currently treated with eliquis. Also low suspicion for pneumonia, PTX, or primary cardiac disease. Testing: cxr, cbc, cmp Treatments: duoneb, solumedrol Patient improved after nebulizer treatment.  Prescribed prednisone taper and will continue breathing treatments at home. The patient will be discharged home. Findings were discussed at length and questions were answered. Information on all newly prescribed medications was given. the patient was instructed to follow-up with his PCP, or return to the Emergency Department with any worsened symptoms or concerns. Return precautions were given. Diagnosis Clinical Impression:  
1. SOB (shortness of breath) 2. Mild asthma with acute exacerbation, unspecified whether persistent Disposition: discharge Follow-up Information Follow up With Specialties Details Why Contact Info [facility] EMERGENCY DEPT Emergency Medicine  If symptoms worsen [phone] 
[phone] Medication List  
  
CHANGE how you take these medications   
predniSONE 10 mg dose pack Commonly known as:  STERAPRED DS As directed What changed:  additional instructions ASK your doctor about these medications * albuterol 90 mcg/actuation inhaler Commonly known as:  PROVENTIL HFA, VENTOLIN HFA, PROAIR HFA Take 2 Puffs by inhalation every four (4) hours as needed for Wheezing. * albuterol 2.5 mg /3 mL (0.083 %) nebulizer solution Commonly known as:  PROVENTIL VENTOLIN 
3 mL by Nebulization route every four (4) hours as needed for Wheezing. butalbital-acetaminophen-caff -40 mg per capsule Commonly known as:  [employer] Take 1 Cap by mouth every four (4) hours as needed for Pain. * This list has 2 medication(s) that are the same as other medications prescribed for you. Read the directions carefully, and ask your doctor or other care provider to review them with you. Where to Get Your Medications Information about where to get these medications is not yet available Ask your nurse or doctor about these medications · predniSONE 10 mg dose pack 
  
 
_______________________________ Attestations: Scribe Attestation [place] acting as a scribe for and in the presence of Antonia Alberto MD     
January 31, 2019 at 11:06 PM 
    
Provider Attestation:     
I personally performed the services described in the documentation, reviewed the documentation, as recorded by the scribe in my presence, and it accurately and completely records my words and actions. January 31, 2019 at 11:06 PM - Antonia Alberto MD  
_______________________________

## 2019-02-01 NOTE — DISCHARGE INSTRUCTIONS

## 2019-02-01 NOTE — ED TRIAGE NOTES
Pt arrived to ED for c/o SOB x 3 hours, leg pain x 2 days. Pt reports history of asthma and blood clots in his legs and lungs. Pt states that he tried using his albuterol inhaler with no relief, takes eliquis.

## 2019-02-06 ENCOUNTER — HOSPITAL ENCOUNTER (EMERGENCY)
Age: 29
Discharge: ANOTHER HOSPITAL | End: 2019-02-07
Attending: EMERGENCY MEDICINE
Payer: MEDICAID

## 2019-02-06 DIAGNOSIS — Z72.0 TOBACCO ABUSE: ICD-10-CM

## 2019-02-06 DIAGNOSIS — F32.A DEPRESSION, UNSPECIFIED DEPRESSION TYPE: ICD-10-CM

## 2019-02-06 DIAGNOSIS — F11.10 HEROIN ABUSE (HCC): Primary | ICD-10-CM

## 2019-02-06 DIAGNOSIS — F14.10 COCAINE ABUSE (HCC): ICD-10-CM

## 2019-02-06 LAB
ALBUMIN SERPL-MCNC: 3.5 G/DL (ref 3.4–5)
ALBUMIN/GLOB SERPL: 1 {RATIO} (ref 0.8–1.7)
ALP SERPL-CCNC: 55 U/L (ref 45–117)
ALT SERPL-CCNC: 18 U/L (ref 16–61)
AMPHET UR QL SCN: NEGATIVE
ANION GAP SERPL CALC-SCNC: 4 MMOL/L (ref 3–18)
AST SERPL-CCNC: 10 U/L (ref 15–37)
BARBITURATES UR QL SCN: NEGATIVE
BASOPHILS # BLD: 0 K/UL (ref 0–0.1)
BASOPHILS NFR BLD: 0 % (ref 0–2)
BENZODIAZ UR QL: NEGATIVE
BILIRUB SERPL-MCNC: 0.6 MG/DL (ref 0.2–1)
BUN SERPL-MCNC: 15 MG/DL (ref 7–18)
BUN/CREAT SERPL: 15 (ref 12–20)
CALCIUM SERPL-MCNC: 8.4 MG/DL (ref 8.5–10.1)
CANNABINOIDS UR QL SCN: NEGATIVE
CHLORIDE SERPL-SCNC: 105 MMOL/L (ref 100–108)
CO2 SERPL-SCNC: 32 MMOL/L (ref 21–32)
COCAINE UR QL SCN: POSITIVE
CREAT SERPL-MCNC: 1.02 MG/DL (ref 0.6–1.3)
DIFFERENTIAL METHOD BLD: ABNORMAL
EOSINOPHIL # BLD: 0.4 K/UL (ref 0–0.4)
EOSINOPHIL NFR BLD: 4 % (ref 0–5)
ERYTHROCYTE [DISTWIDTH] IN BLOOD BY AUTOMATED COUNT: 14.7 % (ref 11.6–14.5)
ETHANOL SERPL-MCNC: <3 MG/DL (ref 0–3)
GLOBULIN SER CALC-MCNC: 3.5 G/DL (ref 2–4)
GLUCOSE SERPL-MCNC: 80 MG/DL (ref 74–99)
HCT VFR BLD AUTO: 45.1 % (ref 36–48)
HDSCOM,HDSCOM: ABNORMAL
HGB BLD-MCNC: 15.7 G/DL (ref 13–16)
LYMPHOCYTES # BLD: 4.1 K/UL (ref 0.9–3.6)
LYMPHOCYTES NFR BLD: 48 % (ref 21–52)
MCH RBC QN AUTO: 27.8 PG (ref 24–34)
MCHC RBC AUTO-ENTMCNC: 34.8 G/DL (ref 31–37)
MCV RBC AUTO: 80 FL (ref 74–97)
METHADONE UR QL: NEGATIVE
MONOCYTES # BLD: 0.7 K/UL (ref 0.05–1.2)
MONOCYTES NFR BLD: 8 % (ref 3–10)
NEUTS SEG # BLD: 3.5 K/UL (ref 1.8–8)
NEUTS SEG NFR BLD: 40 % (ref 40–73)
OPIATES UR QL: POSITIVE
PCP UR QL: NEGATIVE
PLATELET # BLD AUTO: 248 K/UL (ref 135–420)
PMV BLD AUTO: 9.9 FL (ref 9.2–11.8)
POTASSIUM SERPL-SCNC: 4.2 MMOL/L (ref 3.5–5.5)
PROT SERPL-MCNC: 7 G/DL (ref 6.4–8.2)
RBC # BLD AUTO: 5.64 M/UL (ref 4.7–5.5)
SODIUM SERPL-SCNC: 141 MMOL/L (ref 136–145)
WBC # BLD AUTO: 8.4 K/UL (ref 4.6–13.2)

## 2019-02-06 PROCEDURE — 99285 EMERGENCY DEPT VISIT HI MDM: CPT

## 2019-02-06 PROCEDURE — 85025 COMPLETE CBC W/AUTO DIFF WBC: CPT

## 2019-02-06 PROCEDURE — 80307 DRUG TEST PRSMV CHEM ANLYZR: CPT

## 2019-02-06 PROCEDURE — 80053 COMPREHEN METABOLIC PANEL: CPT

## 2019-02-06 NOTE — ED PROVIDER NOTES
[employer] [facility] EMERGENCY DEPT 
 
 
5:09 PM 
 
Date: 2/6/2019 Patient Name: Francie Carpenter History of Presenting Illness Chief Complaint Patient presents with  Addiction problem  
  heroin History Provided By: Patient Chief Complaint: Heroin detox Duration:  Today Timing:  Acute Location: Generalized Quality: Not described Severity: Not reported Modifying Factors: No modifying or aggravating factors were reported. Associated Symptoms: denies any other associated signs or symptoms 29 y.o. male with PMHx of heoin abuse who presents to the ED with request of acute heroin detox today. Patient admits he has been a heroin user for the past 6 months, also admits to cocaine use sporadically. Denies SI, HI or A/V hallucinations. Claims he is willing to come in if deemed appropriate for inpatient admission. No modifying or aggravating factors were reported. No other symptoms or concerns were expressed. No other complaints. Nursing notes regarding the HPI and triage nursing notes were reviewed. Prior medical records were reviewed. Current Outpatient Medications Medication Sig Dispense Refill  apixaban (ELIQUIS) 5 mg tablet Take 5 mg by mouth two (2) times a day.  predniSONE (STERAPRED DS) 10 mg dose pack As directed 21 Tab 0  
 butalbital-acetaminophen-caff (FIORICET) -40 mg per capsule Take 1 Cap by mouth every four (4) hours as needed for Pain. 15 Cap 0  
 albuterol (PROVENTIL HFA, VENTOLIN HFA, PROAIR HFA) 90 mcg/actuation inhaler Take 2 Puffs by inhalation every four (4) hours as needed for Wheezing. 1 Inhaler 0  
 albuterol (PROVENTIL VENTOLIN) 2.5 mg /3 mL (0.083 %) nebulizer solution 3 mL by Nebulization route every four (4) hours as needed for Wheezing. 30 Each 0 Past History Past Medical History: 
Past Medical History:  
Diagnosis Date  Abnormal TSH 11/16/2017  Asthma  Heroin abuse ([facility] [address] \"snorted\"  MDD (major depressive disorder), recurrent severe, without psychosis ([facility] [address] 11/16/2017  Thromboembolus ([facility] [address] 09/2017 Past Surgical History: No past surgical history on file. Family History: 
Family History Problem Relation Age of Onset  No Known Problems Mother  Liver Disease Father Social History: 
Social History Tobacco Use  Smoking status: Former Smoker Types: Cigarettes  Smokeless tobacco: Never Used Substance Use Topics  Alcohol use: No  
 Drug use: No  
 
 
Allergies: 
No Known Allergies Patient's primary care provider (as noted in EPIC): Jeanne, MD Edinson 
 
Review of Systems Constitutional: Negative for diaphoresis. HENT: Negative for congestion. Eyes: Negative for discharge. Respiratory: Negative for stridor. Cardiovascular: Negative for palpitations. Gastrointestinal: Negative for diarrhea. Genitourinary: Negative for flank pain. Musculoskeletal: Negative for back pain. Neurological: Negative for weakness. Psychiatric/Behavioral: Negative for hallucinations and suicidal ideas. All other systems reviewed and are negative. Visit Vitals /78 Pulse 90 Temp 98.2 °F (36.8 °C) Resp 18 Ht 6' 4\" (1.93 m) Wt 145.2 kg (320 lb) SpO2 97% BMI 38.95 kg/m² PHYSICAL EXAM: 
 
CONSTITUTIONAL:  Alert, in no apparent distress;  well developed;  well nourished. HEAD:  Normocephalic, atraumatic. EYES:  EOMI. Non-icteric sclera. Normal conjunctiva. ENTM:  Nose:  no rhinorrhea. Throat:  no erythema or exudate, mucous membranes moist. 
NECK:  No JVD. Supple RESPIRATORY:  Chest clear, equal breath sounds, good air movement. CARDIOVASCULAR:  Regular rate and rhythm. No murmurs, rubs, or gallops. GI:  Normal bowel sounds, abdomen soft and non-tender. No rebound or guarding. BACK:  Non-tender. UPPER EXT:  Normal inspection. LOWER EXT:  No edema, no calf tenderness. Distal pulses intact. NEURO:  Moves all four extremities, and grossly normal motor exam. 
SKIN:  No rashes;  Normal for age. PSYCH:  Alert and normal affect. DIFFERENTIAL DIAGNOSES/ MEDICAL DECISION MAKING:  
Differential diagnoses/impression: Need to rule out obvious organic causes versus psychological etiology. Based on patient's presentation and lab work, I do not believe that there is an obvious organic etiology for the patient's suicidal ideation. I believe the patient needs psychiatric evaluation and treatment for the suicidal ideation. ED COURSE:   
 
Diagnostic Study Results Abnormal lab results from this emergency department encounter: 
Labs Reviewed DRUG SCREEN, URINE - Abnormal; Notable for the following components:  
    Result Value OPIATES POSITIVE (*) COCAINE POSITIVE (*) All other components within normal limits CBC WITH AUTOMATED DIFF - Abnormal; Notable for the following components: RBC 5.64 (*)   
 RDW 14.7 (*)   
 ABS. LYMPHOCYTES 4.1 (*) All other components within normal limits METABOLIC PANEL, COMPREHENSIVE - Abnormal; Notable for the following components:  
 Calcium 8.4 (*) AST (SGOT) 10 (*) All other components within normal limits ETHYL ALCOHOL Lab values for this patient within approximately the last 12 hours: 
Recent Results (from the past 12 hour(s)) DRUG SCREEN, URINE Collection Time: 02/06/19  4:06 PM  
Result Value Ref Range BENZODIAZEPINES NEGATIVE  NEG    
 BARBITURATES NEGATIVE  NEG    
 THC (TH-CANNABINOL) NEGATIVE  NEG    
 OPIATES POSITIVE (A) NEG    
 PCP(PHENCYCLIDINE) NEGATIVE  NEG    
 COCAINE POSITIVE (A) NEG    
 AMPHETAMINES NEGATIVE  NEG METHADONE NEGATIVE  NEG HDSCOM (NOTE) CBC WITH AUTOMATED DIFF Collection Time: 02/06/19  5:51 PM  
Result Value Ref Range WBC 8.4 4.6 - 13.2 K/uL  
 RBC 5.64 (H) 4.70 - 5.50 M/uL  
 HGB 15.7 13.0 - 16.0 g/dL HCT 45.1 36.0 - 48.0 %  MCV 80.0 74.0 - 97.0 FL  
 MCH 27.8 24.0 - 34.0 PG  
 MCHC 34.8 31.0 - 37.0 g/dL  
 RDW 14.7 (H) 11.6 - 14.5 % PLATELET 268 075 - 172 K/uL MPV 9.9 9.2 - 11.8 FL  
 NEUTROPHILS 40 40 - 73 % LYMPHOCYTES 48 21 - 52 % MONOCYTES 8 3 - 10 % EOSINOPHILS 4 0 - 5 % BASOPHILS 0 0 - 2 %  
 ABS. NEUTROPHILS 3.5 1.8 - 8.0 K/UL  
 ABS. LYMPHOCYTES 4.1 (H) 0.9 - 3.6 K/UL  
 ABS. MONOCYTES 0.7 0.05 - 1.2 K/UL  
 ABS. EOSINOPHILS 0.4 0.0 - 0.4 K/UL  
 ABS. BASOPHILS 0.0 0.0 - 0.1 K/UL  
 DF AUTOMATED METABOLIC PANEL, COMPREHENSIVE Collection Time: 02/06/19  5:51 PM  
Result Value Ref Range Sodium 141 136 - 145 mmol/L Potassium 4.2 3.5 - 5.5 mmol/L Chloride 105 100 - 108 mmol/L  
 CO2 32 21 - 32 mmol/L Anion gap 4 3.0 - 18 mmol/L Glucose 80 74 - 99 mg/dL BUN 15 7.0 - 18 MG/DL Creatinine 1.02 0.6 - 1.3 MG/DL  
 BUN/Creatinine ratio 15 12 - 20 GFR est AA >60 >60 ml/min/1.73m2 GFR est non-AA >60 >60 ml/min/1.73m2 Calcium 8.4 (L) 8.5 - 10.1 MG/DL Bilirubin, total 0.6 0.2 - 1.0 MG/DL  
 ALT (SGPT) 18 16 - 61 U/L  
 AST (SGOT) 10 (L) 15 - 37 U/L Alk. phosphatase 55 45 - 117 U/L Protein, total 7.0 6.4 - 8.2 g/dL Albumin 3.5 3.4 - 5.0 g/dL Globulin 3.5 2.0 - 4.0 g/dL A-G Ratio 1.0 0.8 - 1.7 ETHYL ALCOHOL Collection Time: 02/06/19  5:51 PM  
Result Value Ref Range ALCOHOL(ETHYL),SERUM <3 0 - 3 MG/DL Radiologist and cardiologist interpretations if available at time of this note: No results found. Medication(s) ordered for patient during this emergency visit encounter: 
Medications - No data to display Medical Decision Making I am the first provider for this patient. I reviewed the vital signs, available nursing notes, past medical history, past surgical history, family history and social history. Vital Signs:  Reviewed the patient's vital signs. ED COURSE:  The patient has no active medical issues.   I believe that the patient is medically cleared for admission to a psychiatric unit if this is deemed appropriate by the crisis staff after their evaluation of the patient. IMPRESSION AND MEDICAL DECISION MAKING: 
Based upon the patient's presentation with noted HPI and PE, along with the work up done in the emergency department, I believe that the patient is having heroin abuse and seeking detoxification that MAY require admission and further evaluation on a psychiatric/ behavioral medicine unit. THE PATIENT IS MEDICALLY CLEARED FOR ADMISSION TO A PSYCHIATRIC UNIT. He is depressed and abusing heroin. I spoke to the [facility] crisis nurse, Gianni Mendez. Final disposition of the patient will be determined based on the recommendation of the crisis nurse. Condition:  Stable DIAGNOSIS: 
1. Heroin abuse, seeking detoxification. Signout Note Pt care transferred to Dr. Janki Willis  ,ED provider. History of patient complaint(s), available diagnostic reports and current treatment plan has been discussed thoroughly. Bedside rounding on patient occured : no . Intended disposition of patient : TBD Pending diagnostics reports and/or labs (please list): [facility] crisis worker evaluation of patient and disposition recommendation. Coding Diagnoses Clinical Impression: 1. Heroin abuse ([address] 2. Cocaine abuse ([facility] [address] 3. Tobacco abuse 4. Depression, unspecified depression type Sarkis Denton M.D. [facility] Board Certified Emergency Physician Scribe Attestation Aida Brooks acting as a scribe for and in the presence of Bryson Mathis MD     
February 06, 2019 at 5:36 PM    
Provider Attestation: If a scribe was utilized in generation of this patient record, I personally performed the services described in the documentation, reviewed the documentation, as recorded by the scribe in my presence, and it accurately records the patient's history of presenting illness, review of systems, patient physical examination, and procedures performed by me as the attending physician. Elena Mora M.D. [facility] Board Certified Emergency Physician 2/6/2019. 
5:09 PM

## 2019-02-07 VITALS
RESPIRATION RATE: 18 BRPM | WEIGHT: 315 LBS | BODY MASS INDEX: 38.36 KG/M2 | OXYGEN SATURATION: 100 % | HEIGHT: 76 IN | SYSTOLIC BLOOD PRESSURE: 133 MMHG | HEART RATE: 85 BPM | DIASTOLIC BLOOD PRESSURE: 82 MMHG | TEMPERATURE: 97.8 F

## 2019-02-07 NOTE — ED NOTES
9:14 PM :Pt care assumed from Dr. Randa Bryan , ED provider. Pt complaint(s), current treatment plan, progression and available diagnostic results have been discussed thoroughly. Rounding occurred: yes Intended Disposition: TBD Pending diagnostic reports and/or labs (please list): Pending crisis eval. 
 
 
6:54 AM : Pt care transferred to Dr. Dileep Moss  ,ED provider. History of patient complaint(s), available diagnostic reports and current treatment plan has been discussed thoroughly. Bedside rounding on patient occured : yes . Intended disposition of patient : TBD Pending diagnostics reports and/or labs (please list): Pending labs and crisis. Pt to be transferred at 10:30am. In [facility] eval.  
 
9:10 AM eval: no new complaints. Kaela Wu MD 
 
General; Tanja Lei. Pulmonary; CTA-B. Cardiac: RRR no MRG; Abd S/NT/ND. Plan:  [facility] Scribe Attestation Colette Blake acting as a scribe for and in the presence of Yasmin Santos* February 07, 2019 at 6:55 AM 
    
Provider Attestation:     
I personally performed the services described in the documentation, reviewed the documentation, as recorded by the scribe in my presence, and it accurately and completely records my words and actions.  February 07, 2019 at 6:55 AM - Yasmin Santos*

## 2019-02-07 NOTE — ED NOTES
Received patient in room 6. Patient appears in no distress. Reviewed poc with the patient. This includes new medication and suggested follow-up. Questions encouraged and answered. Patient verbalized an understanding

## 2019-02-07 NOTE — BSMART NOTE
Patient reports he came to the ER requesting substance abuse treatment. Patient reports heroin use daily and UDS positive for cocaine and opiates. Patient expresses he has also been dealing with depression. Patient denies any prior treatment. Patient denies SI, HI, and aud/vis hallucinations. Patient reports that he is voluntary for a CSU.  Spoke with Chuck Harris [place] [facility] patient information will be sent off for CSU referral.

## 2020-07-15 NOTE — ED TRIAGE NOTES
Pt c/o left leg pain times 2 days, pt had a vascular study which showed a clot,  Pt denies CP or SOB, pt aao*4, pt ambulatory slow but steady gait
14-Jul-2020 06:33

## 2020-07-17 ENCOUNTER — HOSPITAL ENCOUNTER (EMERGENCY)
Age: 30
Discharge: HOME | End: 2020-07-18
Attending: EMERGENCY MEDICINE
Payer: MEDICAID

## 2020-07-17 DIAGNOSIS — T40.1X1A ACCIDENTAL OVERDOSE OF HEROIN, INITIAL ENCOUNTER (HCC): Primary | ICD-10-CM

## 2020-07-17 PROCEDURE — 99284 EMERGENCY DEPT VISIT MOD MDM: CPT

## 2020-07-17 PROCEDURE — 96360 HYDRATION IV INFUSION INIT: CPT

## 2020-07-18 VITALS
TEMPERATURE: 98.4 F | DIASTOLIC BLOOD PRESSURE: 90 MMHG | SYSTOLIC BLOOD PRESSURE: 140 MMHG | OXYGEN SATURATION: 96 % | HEART RATE: 96 BPM | RESPIRATION RATE: 12 BRPM

## 2020-07-18 LAB
ALBUMIN SERPL-MCNC: 3.5 G/DL (ref 3.4–5)
ALBUMIN/GLOB SERPL: 0.9 {RATIO} (ref 0.8–1.7)
ALP SERPL-CCNC: 46 U/L (ref 45–117)
ALT SERPL-CCNC: 24 U/L (ref 16–61)
ANION GAP SERPL CALC-SCNC: 3 MMOL/L (ref 3–18)
AST SERPL-CCNC: 18 U/L (ref 10–38)
ATRIAL RATE: 91 BPM
BASOPHILS # BLD: 0 K/UL (ref 0–0.1)
BASOPHILS NFR BLD: 0 % (ref 0–2)
BILIRUB SERPL-MCNC: 0.5 MG/DL (ref 0.2–1)
BUN SERPL-MCNC: 17 MG/DL (ref 7–18)
BUN/CREAT SERPL: 15 (ref 12–20)
CALCIUM SERPL-MCNC: 8.4 MG/DL (ref 8.5–10.1)
CALCULATED P AXIS, ECG09: 48 DEGREES
CALCULATED R AXIS, ECG10: 79 DEGREES
CALCULATED T AXIS, ECG11: 34 DEGREES
CHLORIDE SERPL-SCNC: 109 MMOL/L (ref 100–111)
CO2 SERPL-SCNC: 31 MMOL/L (ref 21–32)
CREAT SERPL-MCNC: 1.15 MG/DL (ref 0.6–1.3)
DIAGNOSIS, 93000: NORMAL
DIFFERENTIAL METHOD BLD: ABNORMAL
EOSINOPHIL # BLD: 0.2 K/UL (ref 0–0.4)
EOSINOPHIL NFR BLD: 2 % (ref 0–5)
ERYTHROCYTE [DISTWIDTH] IN BLOOD BY AUTOMATED COUNT: 14.1 % (ref 11.6–14.5)
GLOBULIN SER CALC-MCNC: 3.7 G/DL (ref 2–4)
GLUCOSE SERPL-MCNC: 137 MG/DL (ref 74–99)
HCT VFR BLD AUTO: 42.7 % (ref 36–48)
HGB BLD-MCNC: 14.8 G/DL (ref 13–16)
LYMPHOCYTES # BLD: 1.6 K/UL (ref 0.9–3.6)
LYMPHOCYTES NFR BLD: 19 % (ref 21–52)
MCH RBC QN AUTO: 28.1 PG (ref 24–34)
MCHC RBC AUTO-ENTMCNC: 34.7 G/DL (ref 31–37)
MCV RBC AUTO: 81.2 FL (ref 74–97)
MONOCYTES # BLD: 0.6 K/UL (ref 0.05–1.2)
MONOCYTES NFR BLD: 7 % (ref 3–10)
NEUTS SEG # BLD: 6.1 K/UL (ref 1.8–8)
NEUTS SEG NFR BLD: 72 % (ref 40–73)
P-R INTERVAL, ECG05: 150 MS
PLATELET # BLD AUTO: 198 K/UL (ref 135–420)
PMV BLD AUTO: 10.5 FL (ref 9.2–11.8)
POTASSIUM SERPL-SCNC: 3.9 MMOL/L (ref 3.5–5.5)
PROT SERPL-MCNC: 7.2 G/DL (ref 6.4–8.2)
Q-T INTERVAL, ECG07: 384 MS
QRS DURATION, ECG06: 94 MS
QTC CALCULATION (BEZET), ECG08: 472 MS
RBC # BLD AUTO: 5.26 M/UL (ref 4.7–5.5)
SODIUM SERPL-SCNC: 143 MMOL/L (ref 136–145)
VENTRICULAR RATE, ECG03: 91 BPM
WBC # BLD AUTO: 8.5 K/UL (ref 4.6–13.2)

## 2020-07-18 PROCEDURE — 85025 COMPLETE CBC W/AUTO DIFF WBC: CPT

## 2020-07-18 PROCEDURE — 80053 COMPREHEN METABOLIC PANEL: CPT

## 2020-07-18 PROCEDURE — 74011250636 HC RX REV CODE- 250/636: Performed by: EMERGENCY MEDICINE

## 2020-07-18 PROCEDURE — 93005 ELECTROCARDIOGRAM TRACING: CPT

## 2020-07-18 RX ORDER — NALOXONE HYDROCHLORIDE 1 MG/ML
1 INJECTION INTRAMUSCULAR; INTRAVENOUS; SUBCUTANEOUS ONCE
Status: DISCONTINUED | OUTPATIENT
Start: 2020-07-18 | End: 2020-07-18 | Stop reason: HOSPADM

## 2020-07-18 RX ADMIN — SODIUM CHLORIDE 1000 ML: 900 INJECTION, SOLUTION INTRAVENOUS at 01:08

## 2020-07-18 NOTE — ED PROVIDER NOTES
EMERGENCY DEPARTMENT HISTORY AND PHYSICAL EXAM    12:21 AM      Date: 7/17/2020  Patient Name: Zoraida Fine    History of Presenting Illness     Chief Complaint   Patient presents with    Drug Overdose         History Provided By: Patient    Additional History (Context): Zoraida Fine is a 34 y.o. male with deep vein thrombosis who presents with overdose of heroin. Patient states he snorted heroin. He denies chest pain, nausea, vomiting or diarrhea. Patient does say he became incontinent. He denies cough or fever. PCP: Jeanne, MD [place]        Current Outpatient Medications   Medication Sig Dispense Refill    apixaban (ELIQUIS) 5 mg tablet Take 5 mg by mouth two (2) times a day.  predniSONE (STERAPRED DS) 10 mg dose pack As directed 21 Tab 0    butalbital-acetaminophen-caff (FIORICET) -40 mg per capsule Take 1 Cap by mouth every four (4) hours as needed for Pain. 15 Cap 0    albuterol (PROVENTIL HFA, VENTOLIN HFA, PROAIR HFA) 90 mcg/actuation inhaler Take 2 Puffs by inhalation every four (4) hours as needed for Wheezing. 1 Inhaler 0    albuterol (PROVENTIL VENTOLIN) 2.5 mg /3 mL (0.083 %) nebulizer solution 3 mL by Nebulization route every four (4) hours as needed for Wheezing. 27 Each 0       Past History     Past Medical History:  Past Medical History:   Diagnosis Date    Abnormal TSH 11/16/2017    Asthma     Heroin abuse ([facility] [address]     \"snorted\"    MDD (major depressive disorder), recurrent severe, without psychosis ([facility] [address] 11/16/2017    Thromboembolus ([facility] [address] 09/2017       Past Surgical History:  History reviewed. No pertinent surgical history.     Family History:  Family History   Problem Relation Age of Onset    No Known Problems Mother     Liver Disease Father        Social History:  Social History     Tobacco Use    Smoking status: Former Smoker     Types: Cigarettes    Smokeless tobacco: Never Used   Substance Use Topics    Alcohol use: No    Drug use: No       Allergies:  No Known Allergies      Review of Systems       Review of Systems   Constitutional: Negative. Negative for chills, diaphoresis and fever. HENT: Negative. Negative for congestion, rhinorrhea and sore throat. Eyes: Negative. Negative for pain, discharge and redness. Respiratory: Negative. Negative for cough, chest tightness, shortness of breath and wheezing. Cardiovascular: Negative. Negative for chest pain. Gastrointestinal: Negative. Negative for abdominal pain, constipation, diarrhea, nausea and vomiting. Genitourinary: Negative. Negative for dysuria, flank pain, frequency, hematuria and urgency. Musculoskeletal: Negative. Negative for back pain and neck pain. Skin: Negative. Negative for rash. Neurological: Negative. Negative for syncope, weakness, numbness and headaches. Psychiatric/Behavioral: Negative. All other systems reviewed and are negative. Physical Exam     Visit Vitals  /90   Pulse 96   Temp 98.4 °F (36.9 °C)   Resp 12   SpO2 96%         Physical Exam  Vitals signs and nursing note reviewed. Constitutional:       General: He is not in acute distress. Appearance: Normal appearance. He is well-developed. He is not ill-appearing, toxic-appearing or diaphoretic. HENT:      Head: Normocephalic and atraumatic. Mouth/Throat:      Pharynx: No oropharyngeal exudate. Eyes:      General: No scleral icterus. Conjunctiva/sclera: Conjunctivae normal.      Pupils: Pupils are equal, round, and reactive to light. Neck:      Musculoskeletal: Normal range of motion and neck supple. Thyroid: No thyromegaly. Vascular: No hepatojugular reflux or JVD. Trachea: No tracheal deviation. Cardiovascular:      Rate and Rhythm: Normal rate and regular rhythm. Pulses: Normal pulses. Radial pulses are 2+ on the right side and 2+ on the left side. Dorsalis pedis pulses are 2+ on the right side and 2+ on the left side.       Heart sounds: Normal heart sounds, S1 normal and S2 normal. No murmur. No gallop. No S3 or S4 sounds. Pulmonary:      Effort: Pulmonary effort is normal. No respiratory distress. Breath sounds: Normal breath sounds. No decreased breath sounds, wheezing, rhonchi or rales. Abdominal:      General: Bowel sounds are normal. There is no distension. Palpations: Abdomen is soft. Abdomen is not rigid. There is no mass. Tenderness: There is no abdominal tenderness. There is no guarding or rebound. Negative signs include Rasheed's sign and McBurney's sign. Musculoskeletal: Normal range of motion. Comments: Strength 3 out of 5 throughout. Lymphadenopathy:      Head:      Right side of head: No submental, submandibular, preauricular or occipital adenopathy. Left side of head: No submental, submandibular, preauricular or occipital adenopathy. Cervical: No cervical adenopathy. Upper Body:      Right upper body: No supraclavicular adenopathy. Left upper body: No supraclavicular adenopathy. Skin:     General: Skin is warm and dry. Findings: No rash. Neurological:      Mental Status: He is lethargic. He is not disoriented. GCS: GCS eye subscore is 4. GCS verbal subscore is 5. GCS motor subscore is 6. Cranial Nerves: No cranial nerve deficit. Sensory: No sensory deficit. Coordination: Coordination normal.      Gait: Gait normal.      Deep Tendon Reflexes: Reflexes are normal and symmetric. Psychiatric:         Speech: Speech normal.         Behavior: Behavior normal.         Thought Content:  Thought content normal.         Judgment: Judgment normal.           Diagnostic Study Results     Labs -  Recent Results (from the past 12 hour(s))   CBC WITH AUTOMATED DIFF    Collection Time: 07/18/20  1:00 AM   Result Value Ref Range    WBC 8.5 4.6 - 13.2 K/uL    RBC 5.26 4.70 - 5.50 M/uL    HGB 14.8 13.0 - 16.0 g/dL    HCT 42.7 36.0 - 48.0 %    MCV 81.2 74.0 - 97.0 FL MCH 28.1 24.0 - 34.0 PG    MCHC 34.7 31.0 - 37.0 g/dL    RDW 14.1 11.6 - 14.5 %    PLATELET 228 599 - 840 K/uL    MPV 10.5 9.2 - 11.8 FL    NEUTROPHILS 72 40 - 73 %    LYMPHOCYTES 19 (L) 21 - 52 %    MONOCYTES 7 3 - 10 %    EOSINOPHILS 2 0 - 5 %    BASOPHILS 0 0 - 2 %    ABS. NEUTROPHILS 6.1 1.8 - 8.0 K/UL    ABS. LYMPHOCYTES 1.6 0.9 - 3.6 K/UL    ABS. MONOCYTES 0.6 0.05 - 1.2 K/UL    ABS. EOSINOPHILS 0.2 0.0 - 0.4 K/UL    ABS. BASOPHILS 0.0 0.0 - 0.1 K/UL    DF AUTOMATED     METABOLIC PANEL, COMPREHENSIVE    Collection Time: 07/18/20  1:00 AM   Result Value Ref Range    Sodium 143 136 - 145 mmol/L    Potassium 3.9 3.5 - 5.5 mmol/L    Chloride 109 100 - 111 mmol/L    CO2 31 21 - 32 mmol/L    Anion gap 3 3.0 - 18 mmol/L    Glucose 137 (H) 74 - 99 mg/dL    BUN 17 7.0 - 18 MG/DL    Creatinine 1.15 0.6 - 1.3 MG/DL    BUN/Creatinine ratio 15 12 - 20      GFR est AA >60 >60 ml/min/1.73m2    GFR est non-AA >60 >60 ml/min/1.73m2    Calcium 8.4 (L) 8.5 - 10.1 MG/DL    Bilirubin, total 0.5 0.2 - 1.0 MG/DL    ALT (SGPT) 24 16 - 61 U/L    AST (SGOT) 18 10 - 38 U/L    Alk.  phosphatase 46 45 - 117 U/L    Protein, total 7.2 6.4 - 8.2 g/dL    Albumin 3.5 3.4 - 5.0 g/dL    Globulin 3.7 2.0 - 4.0 g/dL    A-G Ratio 0.9 0.8 - 1.7     EKG, 12 LEAD, INITIAL    Collection Time: 07/18/20  1:05 AM   Result Value Ref Range    Ventricular Rate 91 BPM    Atrial Rate 91 BPM    P-R Interval 150 ms    QRS Duration 94 ms    Q-T Interval 384 ms    QTC Calculation (Bezet) 472 ms    Calculated P Axis 48 degrees    Calculated R Axis 79 degrees    Calculated T Axis 34 degrees    Diagnosis       Normal sinus rhythm  Anteroseptal infarct (cited on or before 18-JUL-2020)  Abnormal ECG  When compared with ECG of 31-JAN-2019 22:58,  Questionable change in initial forces of Anterior leads         Radiologic Studies -   No orders to display         Medical Decision Making   Provider Notes (Medical Decision Making):  MDM  Number of Diagnoses or Management Options  Diagnosis management comments: Heroin overdose. I am the first provider for this patient. I reviewed the vital signs, available nursing notes, past medical history, past surgical history, family history and social history. Vital Signs-Reviewed the patient's vital signs. Records Reviewed: Nursing Notes (Time of Review: 12:22 AM)    ED Course: Progress Notes, Reevaluation, and Consults:    Labs essentially normal.  3:02 AM 7/17/2020      Diagnosis       I have reassessed the patient. Patient is feeling well. Patient was discharged in stable condition. Patient is to return to emergency department if any new or worsening condition. Clinical Impression:   1. Accidental overdose of heroin, initial encounter [facility])        Disposition: Discharged home     Follow-up Information     Follow up With Specialties Details Why [address]  In 2 days  Betty [address]  [facility] [address]             Attestation        Provider Attestation:     I personally performed the services described in the documentation, reviewed the documentation and it accurately and completely records my words and actions utilizing the [address] [place] July 18, 2020 at 3:06 AM - Yao Armstrong DO    Disclaimer. It is dictated using utilizing voice recognition software. Unfortunately this leads to occasional typographical errors. I apologize in advance if the situation occurs. If questions arise please do not hesitate to contact me or call our department.

## 2020-07-18 NOTE — DISCHARGE INSTRUCTIONS
Patient Education        Opioid Overdose: Care Instructions  Your Care Instructions     You have had treatment to help your body recover from taking too much of an opioid. You are getting better, but you may not feel well for a while. It takes time for the opioids to leave your body. How long it takes to feel better depends on which drug you took and how much you took of it. Opioids include illegal drugs such as heroin, often called smack, junk, H, and ska. Opioids also include medicines that doctors prescribe to treat pain. These are medicines such as oxycodone, methadone, and buprenorphine. They are sometimes sold and used illegally. Taking too much of an opioid can be dangerous. It may cause:  · Trouble breathing. · Low blood pressure. · A low heart rate. · A coma. When the doctor treated you for the overdose, he or she may have:  · Watched your symptoms or done tests to find out what kind of drug you took. · Given you fluids. · Given you oxygen to help you breathe. · Given you a medicine called naloxone to help reverse the effects of the opioid. · Done several tests, including blood tests, to see how you're responding to treatment. The doctor also watched you carefully to make sure you were recovering safely. Follow-up care is a key part of your treatment and safety. Be sure to make and go to all appointments, and call your doctor if you are having problems. It's also a good idea to know your test results and keep a list of the medicines you take. How can you care for yourself at home? · If you take opioids regularly, your body gets used to them. This is called physical dependence. If you are physically dependent on opioids, you may have withdrawal symptoms when you stop using them or use less. These symptoms can include nausea, sweating, chills, diarrhea, stomach cramps, and muscle aches. Withdrawal can last up to several weeks, depending on which opioid you took and how long you took it.  You may feel very ill, but you probably aren't in medical danger. · Your doctor may give you medicine to help you feel better. To help you get through withdrawal, you can also:  ? Get plenty of rest.  ? Drink plenty of fluids. ? Stay active. ? Eat a healthy diet. · If you had a tube in your throat to help you breathe, you may have a sore throat or hoarseness that can last a few days. Sip liquids to help soothe your throat. · Do not drink alcohol or take illegal drugs. · Do not take medicines that make you tired, like sleeping pills or muscle relaxers. · Do not drive if you feel sleepy or groggy while you recover from an overdose. · Get help to stop using opioids. Talk to your doctor about substance use treatment programs. · Talk to your doctor or pharmacist about having a naloxone rescue kit on hand. When should you call for help? [id number] anytime you think you may need emergency care. For example, call if:  · You feel you cannot stop from hurting yourself or someone else. Call your doctor now or seek immediate medical care if:  · You have new or worse withdrawal symptoms, such as:  ? Stomach cramps. ? Vomiting. ? Diarrhea. ? Muscle aches. ? Sweating. Watch closely for changes in your health, and be sure to contact your doctor if:  · You do not get better as expected. Where can you learn more? Go to http://evangelina-anne-marie.info/  Enter Z171 in the search box to learn more about \"Opioid Overdose: Care Instructions. \"  Current as of: August 22, 2019               Content Version: 12.5  © 4898-6047 [employer]. Care instructions adapted under license by [employer]® (which disclaims liability or warranty for this information). If you have questions about a medical condition or this instruction, always ask your healthcare professional. [address] any warranty or liability for your use of this information.

## 2020-07-18 NOTE — ED NOTES
Received nursing report from Alexander Centeno, [facility]. Patient A/O x 4, sleeping with loud snoring. Chest rise and fall noted. Awaiting for patient to become alert enough to be discharged.

## 2020-07-20 ENCOUNTER — PATIENT OUTREACH (OUTPATIENT)
Dept: CASE MANAGEMENT | Age: 30
End: 2020-07-20

## 2020-07-20 NOTE — PROGRESS NOTES
Date/Time:  7/20/2020 12:11 PM  Attempted to reach patient by telephone. Left HIPPA compliant message requesting a return call. Will attempt to reach patient again.

## 2020-07-22 ENCOUNTER — PATIENT OUTREACH (OUTPATIENT)
Dept: CASE MANAGEMENT | Age: 30
End: 2020-07-22

## 2020-07-22 NOTE — PROGRESS NOTES
Date/Time:  7/22/2020 1:57 PM  2nd attempt to reach patient by telephone. Left HIPPA compliant message requesting a return call. This episode is resolved.

## 2020-09-29 ENCOUNTER — HOSPITAL ENCOUNTER (EMERGENCY)
Age: 30
Discharge: HOME | End: 2020-09-30
Attending: EMERGENCY MEDICINE
Payer: MEDICAID

## 2020-09-29 ENCOUNTER — APPOINTMENT (OUTPATIENT)
Dept: GENERAL RADIOLOGY | Age: 30
End: 2020-09-29
Attending: EMERGENCY MEDICINE
Payer: MEDICAID

## 2020-09-29 DIAGNOSIS — J96.01 ACUTE HYPOXEMIC RESPIRATORY FAILURE (HCC): ICD-10-CM

## 2020-09-29 DIAGNOSIS — T40.601A OPIATE OVERDOSE, ACCIDENTAL OR UNINTENTIONAL, INITIAL ENCOUNTER (HCC): Primary | ICD-10-CM

## 2020-09-29 DIAGNOSIS — J45.21 MILD INTERMITTENT ASTHMA WITH ACUTE EXACERBATION: ICD-10-CM

## 2020-09-29 PROCEDURE — 99285 EMERGENCY DEPT VISIT HI MDM: CPT

## 2020-09-29 PROCEDURE — 71045 X-RAY EXAM CHEST 1 VIEW: CPT

## 2020-09-29 RX ORDER — IPRATROPIUM BROMIDE AND ALBUTEROL SULFATE 2.5; .5 MG/3ML; MG/3ML
9 SOLUTION RESPIRATORY (INHALATION) ONCE
Status: COMPLETED | OUTPATIENT
Start: 2020-09-29 | End: 2020-09-30

## 2020-09-29 NOTE — LETTER
Discharge diagnosis: Opiate overdose, asthma exacerbation Physician: Dr. Tabor Late Follow-up: Follow-up with your primary care physician as soon as possible. Medications: Naloxone, albuterol inhaler Return precautions: Please return to the emergency department as needed or if symptoms worsen. Patient Education Asthma Attack: Care Instructions Your Care Instructions During an asthma attack, the airways swell and narrow. This makes it hard to breathe. Severe asthma attacks can be life-threatening, but you can help prevent them by keeping your asthma under control and treating symptoms before they get bad. Symptoms include being short of breath, having chest tightness, coughing, and wheezing. Noting and treating these symptoms can also help you avoid future trips to the emergency room. The doctor has checked you carefully, but problems can develop later. If you notice any problems or new symptoms, get medical treatment right away. Follow-up care is a key part of your treatment and safety. Be sure to make and go to all appointments, and call your doctor if you are having problems. It's also a good idea to know your test results and keep a list of the medicines you take. How can you care for yourself at home? · Follow your asthma action plan to prevent and treat attacks. If you don't have an asthma action plan, work with your doctor to create one. · Take your asthma medicines exactly as prescribed. Talk to your doctor right away if you have any questions about how to take them. ? Use your quick-relief medicine when you have symptoms of an attack. Quick-relief medicine is usually an albuterol inhaler. Some people need to use quick-relief medicine before they exercise. ? Take your controller medicine every day, not just when you have symptoms. Controller medicine is usually an inhaled corticosteroid. The goal is to prevent problems before they occur.  Don't use your controller medicine to treat an attack that has already started. It doesn't work fast enough to help. ? If your doctor prescribed corticosteroid pills to use during an attack, take them exactly as prescribed. It may take hours for the pills to work, but they may make the episode shorter and help you breathe better. ? Keep your quick-relief medicine with you at all times. · Talk to your doctor before using other medicines. Some medicines, such as aspirin, can cause asthma attacks in some people. · If you have a peak flow meter, use it to check how well you are breathing. This can help you predict when an asthma attack is going to occur. Then you can take medicine to prevent the asthma attack or make it less severe. · Do not smoke or allow others to smoke around you. Avoid smoky places. Smoking makes asthma worse. If you need help quitting, talk to your doctor about stop-smoking programs and medicines. These can increase your chances of quitting for good. · Learn what triggers an asthma attack for you, and avoid the triggers when you can. Common triggers include colds, smoke, air pollution, dust, pollen, mold, pets, cockroaches, stress, and cold air. · Avoid colds and the flu. Get a pneumococcal vaccine shot. If you have had one before, ask your doctor if you need a second dose. Get a flu vaccine every fall. If you must be around people with colds or the flu, wash your hands often. When should you call for help? Call 911 anytime you think you may need emergency care. For example, call if: 
  · You have severe trouble breathing. Call your doctor now or seek immediate medical care if: 
  · Your symptoms do not get better after you have followed your asthma action plan.  
  · You have new or worse trouble breathing.  
  · Your coughing and wheezing get worse.  
  · You cough up dark brown or bloody mucus (sputum).  
  · You have a new or higher fever. Watch closely for changes in your health, and be sure to contact your doctor if: 
  · You need to use quick-relief medicine on more than 2 days a week (unless it is just for exercise).  
  · You cough more deeply or more often, especially if you notice more mucus or a change in the color of your mucus.  
  · You are not getting better as expected. Where can you learn more? Go to http://www.gray.com/ Enter P278 in the search box to learn more about \"Asthma Attack: Care Instructions. \" Current as of: February 24, 2020               Content Version: 12.6 © 8936-5731 [employer]. Care instructions adapted under license by [employer] (which disclaims liability or warranty for this information). If you have questions about a medical condition or this instruction, always ask your healthcare professional. [address] any warranty or liability for your use of this information. Patient Education Learning About Asthma Triggers What are asthma triggers? When you have asthma, certain things can make your symptoms worse. These are called triggers. Learn what triggers an asthma attack for you, and avoid the triggers when you can. Common triggers include colds, smoke, air pollution, dust, pollen, pets, stress, and cold air. How do asthma triggers affect you? Triggers can make it harder for your lungs to work as they should. They can lead to sudden breathing problems and other symptoms. When you are around a trigger, an asthma attack is more likely. If your symptoms are severe, you may need emergency treatment or have to go to the hospital for treatment. What can you do to avoid triggers? The first thing is to know your triggers. When you are having symptoms, note the things around you that might be causing them. Then look for patterns that may be triggering your symptoms. Record your triggers on a piece of paper or in an asthma diary. When you have your list of possible triggers, work with your doctor to find ways to avoid them. Avoid colds and flu. Get a pneumococcal vaccine shot. If you have had one before, ask your doctor whether you need a second dose. Get a flu vaccine every year, as soon as it's available. If you must be around people with colds or the flu, wash your hands often. Here are some ways to avoid a few common triggers. · Do not smoke or allow others to smoke around you. If you need help quitting, talk to your doctor about stop-smoking programs and medicines. These can increase your chances of quitting for good. · If there is a lot of pollution, pollen, or dust outside, stay at home and keep your windows closed. Use an air conditioner or air filter in your home. Check your local weather report or newspaper for air quality and pollen reports. What else should you know? · Take your controller medicine every day, not just when you have symptoms. It helps prevent problems before they occur. · Your doctor may suggest that you check how well your lungs are working by measuring your peak expiratory flow (PEF) throughout the day. Your PEF may drop when you are near things that trigger symptoms. Where can you learn more? Go to http://www.gray.com/ Enter A350 in the search box to learn more about \"Learning About Asthma Triggers. \" Current as of: February 24, 2020               Content Version: 12.6 © 4934-9278 [employer]. Care instructions adapted under license by [employer] (which disclaims liability or warranty for this information). If you have questions about a medical condition or this instruction, always ask your healthcare professional. [place] [address] any warranty or liability for your use of this information. Patient Education Accidental Overdose of Medicine in Children: Care Instructions Your Care Instructions Almost any medicine can cause harm if your child takes too much of it. Your child has been treated to help his or her body get rid of an overdose of a medicine. This may have been an over-the-counter medicine. Or it might have been one that a doctor prescribed. It may even have been a vitamin or a supplement. During treatment, the doctor may have given your child fluids and medicine. Your child also may have had lab tests. Then the doctor made sure that your child was well enough to go home. The doctor has checked your child carefully, but problems can develop later. If you notice any problems or new symptoms, get medical treatment right away. Follow-up care is a key part of your child's treatment and safety. Be sure to make and go to all appointments, and call your doctor if your child is having problems. It's also a good idea to know your child's test results and keep a list of the medicines your child takes. How can you care for your child at home? Home care · Talk with your doctor about what your child should eat or drink. · If your child normally takes medicines, ask your doctor when your child can start taking them again. · Read the information that comes with any medicine. If you have questions, ask your doctor or pharmacist. 
Prevention · Be safe with medicines. Give all medicines exactly as prescribed or as the label directs. Call your doctor if you think your child is having a problem with his or her medicine. · Store all medicines out of the reach of children. Keep medicines in the containers they came in. Many of these are child-resistant. · Keep the phone number for the [facility] ([phone]) near your phone. When should you call for help? Poison control centers, hospitals, or your doctor can give immediate advice in the case of a poisoning.  The [place] Control Hotline phone number is 1-328.263.9317. Have the poison container with you so you can give complete information to the poison control center. This includes what the poison or substance is, when it was taken, and how much was taken. Do not try to make your child vomit. Call 911 anytime you think your child may need emergency care. For example, call if: 
  · Your child passes out (loses consciousness).  
  · Your child has trouble breathing.  
  · Your child is sleepy or hard to wake up.  
  · Your child is having a seizure. Call your doctor now or seek immediate medical care if: 
  · Your child is vomiting.  
  · Your child has a new or worse headache.  
  · Your child is dizzy or lightheaded or feels like he or she may faint. Watch closely for changes in your child's health, and be sure to contact your doctor if: 
  · Your child does not get better as expected. Where can you learn more? Go to http://www.gray.com/ Enter Y500 in the search box to learn more about \"Accidental Overdose of Medicine in Children: Care Instructions. \" Current as of: August 22, 2019               Content Version: 12.6 © 4300-5631 [employer]. Care instructions adapted under license by [employer] (which disclaims liability or warranty for this information). If you have questions about a medical condition or this instruction, always ask your healthcare professional. [place] [address] any warranty or liability for your use of this information. Patient Education Accidental Overdose of Medicine: Care Instructions Your Care Instructions Almost any medicine can cause harm if you take too much of it. You have had treatment to help your body get rid of an overdose of a medicine. This may have been an over-the-counter medicine. Or it might be one that a doctor prescribed. It may even have been a vitamin or a supplement. During treatment, the doctor may have given you fluids and medicine. You also may have had lab tests. Then the doctor made sure that you were well enough to go home. The doctor has checked you carefully, but problems can develop later. If you notice any problems or new symptoms, get medical treatment right away. Follow-up care is a key part of your treatment and safety. Be sure to make and go to all appointments, and call your doctor if you are having problems. It's also a good idea to know your test results and keep a list of the medicines you take. How can you care for yourself at home? Home care · Talk with your doctor about what you should eat or drink. · If you normally take medicines, ask your doctor when you can start taking them again. · Read the information that comes with any medicine. If you have questions, ask your doctor or pharmacist. 
Prevention · Be safe with medicines. Take your medicines exactly as prescribed or as the label directs. Call your doctor if you think you are having a problem with your medicine. · Keep your medicines in the containers they came in. Keep them with the original labels. · Find out what to do if you miss a dose of your medicine. When should you call for help? Poison control centers, hospitals, or your doctor can give immediate advice in the case of a poisoning. The [facility] number is [phone]. Have the poison container with you so you can give complete information to the poison control center. This includes what the poison or substance is, when it was taken, and how much was taken. Do not try to make the person vomit. Call 911 anytime you think you may need emergency care. For example, call if you or someone else: 
  · Has used or currently uses alcohol or drugs and is very confused or can't stay awake.  
  · Has passed out (lost consciousness).  
  · Has severe trouble breathing.  
  · Is having a seizure. Call your doctor now or seek immediate medical care if: 
  · You are vomiting.  
  · You have a new or worse headache.  
  · You are dizzy or lightheaded or feel like you may faint. Watch closely for changes in your health, and be sure to contact your doctor if: 
  · You do not get better as expected. Where can you learn more? Go to http://www.gray.com/ Enter C165 in the search box to learn more about \"Accidental Overdose of Medicine: Care Instructions. \" Current as of: August 22, 2019               Content Version: 12.6 © 0849-2933 [employer]. Care instructions adapted under license by [employer] (which disclaims liability or warranty for this information). If you have questions about a medical condition or this instruction, always ask your healthcare professional. [address] any warranty or liability for your use of this information.

## 2020-09-30 VITALS
HEART RATE: 85 BPM | OXYGEN SATURATION: 99 % | DIASTOLIC BLOOD PRESSURE: 70 MMHG | RESPIRATION RATE: 20 BRPM | SYSTOLIC BLOOD PRESSURE: 131 MMHG | TEMPERATURE: 98 F

## 2020-09-30 PROCEDURE — 74011000250 HC RX REV CODE- 250: Performed by: EMERGENCY MEDICINE

## 2020-09-30 PROCEDURE — 74011250637 HC RX REV CODE- 250/637: Performed by: EMERGENCY MEDICINE

## 2020-09-30 PROCEDURE — 87635 SARS-COV-2 COVID-19 AMP PRB: CPT

## 2020-09-30 RX ORDER — NALOXONE HYDROCHLORIDE 4 MG/.1ML
SPRAY NASAL
Qty: 1 EACH | Refills: 0 | Status: SHIPPED | OUTPATIENT
Start: 2020-09-30

## 2020-09-30 RX ORDER — DEXAMETHASONE SODIUM PHOSPHATE 4 MG/ML
10 INJECTION, SOLUTION INTRA-ARTICULAR; INTRALESIONAL; INTRAMUSCULAR; INTRAVENOUS; SOFT TISSUE
Status: COMPLETED | OUTPATIENT
Start: 2020-09-30 | End: 2020-09-30

## 2020-09-30 RX ORDER — ALBUTEROL SULFATE 90 UG/1
2 AEROSOL, METERED RESPIRATORY (INHALATION)
Qty: 1 INHALER | Refills: 5 | Status: SHIPPED | OUTPATIENT
Start: 2020-09-30

## 2020-09-30 RX ADMIN — DEXAMETHASONE SODIUM PHOSPHATE 10 MG: 4 INJECTION, SOLUTION INTRA-ARTICULAR; INTRALESIONAL; INTRAMUSCULAR; INTRAVENOUS; SOFT TISSUE at 05:58

## 2020-09-30 RX ADMIN — IPRATROPIUM BROMIDE AND ALBUTEROL SULFATE 9 ML: .5; 3 SOLUTION RESPIRATORY (INHALATION) at 00:03

## 2020-09-30 NOTE — ED TRIAGE NOTES
Pt stated he took some heroin which he uses everyday, pt very sleepy in trage, hypoxic, pt easily arousable but continues to fall aslepp with snoring respirations, pt taken to room, placed on monitor and oxygen, Dr. Gregoria Dougherty informed.

## 2020-09-30 NOTE — ED PROVIDER NOTES
EMERGENCY DEPARTMENT HISTORY AND PHYSICAL EXAM      Date: 9/29/2020  Patient Name: Jarad Coleman    History of Presenting Illness     Chief Complaint   Patient presents with    Drug Problem    Wheezing       History (Context): Jarad Coleman is a 34 y.o. man with polysubstance use disorder, who presents via EMS after acute onset, severe, persistent loss of consciousness, improved with administration of intranasal Narcan. Patient now having wheezing. The wheezing is mild to moderate and associated with shortness of breath    On review of systems, the patient denies fever, chills, rashes, nausea, vomiting, diarrhea. PCP: Edinson Angel MD    Current Outpatient Medications   Medication Sig Dispense Refill    naloxone (Narcan) 4 mg/actuation nasal spray Use 1 spray intranasally, then discard. Repeat with new spray every 2 min as needed for opioid overdose symptoms, alternating nostrils. 1 Each 0    albuterol (PROVENTIL HFA, VENTOLIN HFA, PROAIR HFA) 90 mcg/actuation inhaler Take 2 Puffs by inhalation every four (4) hours as needed for Wheezing. 1 Inhaler 5    apixaban (ELIQUIS) 5 mg tablet Take 5 mg by mouth two (2) times a day.  predniSONE (STERAPRED DS) 10 mg dose pack As directed 21 Tab 0    butalbital-acetaminophen-caff (FIORICET) -40 mg per capsule Take 1 Cap by mouth every four (4) hours as needed for Pain. 15 Cap 0       Past History     Past Medical History:  Past Medical History:   Diagnosis Date    Abnormal TSH 11/16/2017    Asthma     Heroin abuse ([facility] [address]     \"snorted\"    MDD (major depressive disorder), recurrent severe, without psychosis ([facility] [address] 11/16/2017    Thromboembolus ([facility] [address] 09/2017       Past Surgical History:  No past surgical history on file.     Family History:  Family History   Problem Relation Age of Onset    No Known Problems Mother     Liver Disease Father        Social History:  Social History     Tobacco Use    Smoking status: Former Smoker     Types: Cigarettes    Smokeless tobacco: Never Used   Substance Use Topics    Alcohol use: No    Drug use: No       Allergies:  No Known Allergies    PMH, PSH, family history, social history, allergies reviewed with the patient with significant items noted above. Review of Systems   As per HPI, otherwise reviewed and negative. Physical Exam     Vitals:    09/29/20 2253 09/29/20 2315 09/30/20 0000   BP: (!) 137/91 (!) 143/90 131/70   Pulse: 95 88 85   Resp: 8 23 20   Temp: 98 °F (36.7 °C)     SpO2: (!) 88% 99% 99%       Gen: Mildly ill-appearing, in no acute distress  HEENT: Normocephalic, sclera anicteric, pupils pinpoint bilaterally  Cardiovascular: Normal rate, regular rhythm, no murmurs, rubs, gallops. Pulses intact and equal distally. Pulmonary: Tachypneic, wheezing throughout,  ABD: Soft, nontender, nondistended. Neuro: Alert. Slurred speech. Altered mentation. Psych: Normal thought content and thought processes. : No CVA tenderness  EXT: Moves all extremities well. No cyanosis or clubbing. Skin: Warm and well-perfused. Diagnostic Study Results     Labs -   No results found for this or any previous visit (from the past 12 hour(s)). Radiologic Studies -   XR CHEST PORT   Final Result   IMPRESSION:      Negative chest.           CT Results  (Last 48 hours)    None        CXR Results  (Last 48 hours)    None            Medical Decision Making   I am the first provider for this patient. I reviewed the vital signs, available nursing notes, past medical history, past surgical history, family history and social history. Vital Signs-Reviewed the patient's vital signs. EKG: Interpreted by myself. Records Reviewed: Personally, on initial evaluation    MDM:   Patient presents with altered mental status in the setting of opiate use. Exam significant for pinpoint pupils, altered mental status, wheezing, tachypneic, hypoxemic.   Status post Narcan  DDX considered: Opiate overdose  DDX thought to be less likely but also considered due to high risk condition: Brainstem stroke, brainstem bleed    Patient condition on initial evaluation: Moderately ill    Plan:   Antiemetics  Close Observation    Orders as below:  Orders Placed This Encounter    XR CHEST PORT    NOVEL CORONAVIRUS (COVID-19)    albuterol-ipratropium (DUO-NEB) 2.5 MG-0.5 MG/3 ML    naloxone (Narcan) 4 mg/actuation nasal spray    dexamethasone (DECADRON) 4 mg/mL Oral 10 mg    albuterol (PROVENTIL HFA, VENTOLIN HFA, PROAIR HFA) 90 mcg/actuation inhaler        ED Course:   Patient improved with nebulizers. Radiograph negative. Given syndrome, will test for novel coronavirus. After multiple reassessments, the patient improved with mental status and eventually came off of oxygen. Patient condition at time of disposition: Stable  DISCHARGE NOTE:   Pt has been reexamined. Patient has no new complaints, changes, or physical findings. Care plan outlined and precautions discussed. Results were reviewed with the patient. All medications were reviewed with the patient; will d/c home with Narcan. All of pt's questions and concerns were addressed. Alarm symptoms and return precautions associated with chief complaint and evaluation were reviewed with the patient in detail. The patient demonstrated adequate understanding. Patient was instructed and agrees to follow up with substance abuse, PCP, as well as to return to the ED upon further deterioration. Patient is ready to go home. The patient is happy with this plan    Critical Care Time:  The services I provided to this patient were to treat and/or prevent clinically significant deterioration that could result in the failure of one or more body systems and/or organ systems due to drug overdose, bronchospasm, acute hypoxemic respiratory failure.     Services included the following:  -reviewing nursing notes and old charts  -vital sign assessments  -direct patient care  -medication orders and management  -interpreting and reviewing diagnostic studies/labs  -re-evaluations  -documentation time    Aggregate critical care time was 40 minutes, which includes only time during which I was engaged in work directly related to the patient's care as described above, whether I was at bedside or elsewhere in the Emergency Department. It did not include time spent performing other reported procedures or the services of residents, students, nurses, or advance practice providers. DMT      Follow-up Information     Follow up With Specialties Details Why [facility]    [facility] EMERGENCY DEPT Emergency Medicine  As needed, If symptoms worsen [address] [facility] [address]  [phone]          Discharge Medication List as of 9/30/2020  7:15 AM      START taking these medications    Details   naloxone (Narcan) 4 mg/actuation nasal spray Use 1 spray intranasally, then discard. Repeat with new spray every 2 min as needed for opioid overdose symptoms, alternating nostrils. , Print, Disp-1 Each,R-0         CONTINUE these medications which have CHANGED    Details   albuterol (PROVENTIL HFA, VENTOLIN HFA, PROAIR HFA) 90 mcg/actuation inhaler Take 2 Puffs by inhalation every four (4) hours as needed for Wheezing., Print, Disp-1 Inhaler,R-5         CONTINUE these medications which have NOT CHANGED    Details   apixaban (ELIQUIS) 5 mg tablet Take 5 mg by mouth two (2) times a day., Historical Med      predniSONE (STERAPRED DS) 10 mg dose pack As directed, Print, Disp-21 Tab, R-0      butalbital-acetaminophen-caff (FIORICET) -40 mg per capsule Take 1 Cap by mouth every four (4) hours as needed for Pain., Print, Disp-15 Cap, R-0         STOP taking these medications       albuterol (PROVENTIL VENTOLIN) 2.5 mg /3 mL (0.083 %) nebulizer solution Comments:   Reason for Stopping:                   Disposition: Discharge home    Diagnosis     Clinical Impression:   1.  Opiate overdose, accidental or unintentional, initial encounter (Clinton Lopez)    2. Mild intermittent asthma with acute exacerbation        Signed,  Ugo Thompson MD  Emergency Physician  [facility]    As a voice dictation software was utilized to dictate this note, minor word transpositions can occur. I apologize for confusing wording and typographic errors. Please feel free to contact me for clarification.

## 2020-09-30 NOTE — DISCHARGE INSTRUCTIONS
Patient Education        Asthma Attack: Care Instructions  Your Care Instructions     During an asthma attack, the airways swell and narrow. This makes it hard to breathe. Severe asthma attacks can be life-threatening, but you can help prevent them by keeping your asthma under control and treating symptoms before they get bad. Symptoms include being short of breath, having chest tightness, coughing, and wheezing. Noting and treating these symptoms can also help you avoid future trips to the emergency room. The doctor has checked you carefully, but problems can develop later. If you notice any problems or new symptoms, get medical treatment right away. Follow-up care is a key part of your treatment and safety. Be sure to make and go to all appointments, and call your doctor if you are having problems. It's also a good idea to know your test results and keep a list of the medicines you take. How can you care for yourself at home? · Follow your asthma action plan to prevent and treat attacks. If you don't have an asthma action plan, work with your doctor to create one. · Take your asthma medicines exactly as prescribed. Talk to your doctor right away if you have any questions about how to take them. ? Use your quick-relief medicine when you have symptoms of an attack. Quick-relief medicine is usually an albuterol inhaler. Some people need to use quick-relief medicine before they exercise. ? Take your controller medicine every day, not just when you have symptoms. Controller medicine is usually an inhaled corticosteroid. The goal is to prevent problems before they occur. Don't use your controller medicine to treat an attack that has already started. It doesn't work fast enough to help. ? If your doctor prescribed corticosteroid pills to use during an attack, take them exactly as prescribed. It may take hours for the pills to work, but they may make the episode shorter and help you breathe better. ?  Keep your quick-relief medicine with you at all times. · Talk to your doctor before using other medicines. Some medicines, such as aspirin, can cause asthma attacks in some people. · If you have a peak flow meter, use it to check how well you are breathing. This can help you predict when an asthma attack is going to occur. Then you can take medicine to prevent the asthma attack or make it less severe. · Do not smoke or allow others to smoke around you. Avoid smoky places. Smoking makes asthma worse. If you need help quitting, talk to your doctor about stop-smoking programs and medicines. These can increase your chances of quitting for good. · Learn what triggers an asthma attack for you, and avoid the triggers when you can. Common triggers include colds, smoke, air pollution, dust, pollen, mold, pets, cockroaches, stress, and cold air. · Avoid colds and the flu. Get a pneumococcal vaccine shot. If you have had one before, ask your doctor if you need a second dose. Get a flu vaccine every fall. If you must be around people with colds or the flu, wash your hands often. When should you call for help? Call 911 anytime you think you may need emergency care. For example, call if:    · You have severe trouble breathing. Call your doctor now or seek immediate medical care if:    · Your symptoms do not get better after you have followed your asthma action plan.     · You have new or worse trouble breathing.     · Your coughing and wheezing get worse.     · You cough up dark brown or bloody mucus (sputum).     · You have a new or higher fever. Watch closely for changes in your health, and be sure to contact your doctor if:    · You need to use quick-relief medicine on more than 2 days a week (unless it is just for exercise).     · You cough more deeply or more often, especially if you notice more mucus or a change in the color of your mucus.     · You are not getting better as expected. Where can you learn more?   Go to http://www.gray.com/  Enter [id number] in the search box to learn more about \"Asthma Attack: Care Instructions. \"  Current as of: February 24, 2020               Content Version: 12.6  © 2006-2020 [employer] Care instructions adapted under license by [employer] (which disclaims liability or warranty for this information). If you have questions about a medical condition or this instruction, always ask your healthcare professional. [place] [address] any warranty or liability for your use of this information. Patient Education        Learning About Asthma Triggers  What are asthma triggers? When you have asthma, certain things can make your symptoms worse. These are called triggers. Learn what triggers an asthma attack for you, and avoid the triggers when you can. Common triggers include colds, smoke, air pollution, dust, pollen, pets, stress, and cold air. How do asthma triggers affect you? Triggers can make it harder for your lungs to work as they should. They can lead to sudden breathing problems and other symptoms. When you are around a trigger, an asthma attack is more likely. If your symptoms are severe, you may need emergency treatment or have to go to the hospital for treatment. What can you do to avoid triggers? The first thing is to know your triggers. When you are having symptoms, note the things around you that might be causing them. Then look for patterns that may be triggering your symptoms. Record your triggers on a piece of paper or in an asthma diary. When you have your list of possible triggers, work with your doctor to find ways to avoid them. Avoid colds and flu. Get a pneumococcal vaccine shot. If you have had one before, ask your doctor whether you need a second dose. Get a flu vaccine every year, as soon as it's available. If you must be around people with colds or the flu, wash your hands often.   Here are some ways to avoid a few common triggers. · Do not smoke or allow others to smoke around you. If you need help quitting, talk to your doctor about stop-smoking programs and medicines. These can increase your chances of quitting for good. · If there is a lot of pollution, pollen, or dust outside, stay at home and keep your windows closed. Use an air conditioner or air filter in your home. Check your local weather report or newspaper for air quality and pollen reports. What else should you know? · Take your controller medicine every day, not just when you have symptoms. It helps prevent problems before they occur. · Your doctor may suggest that you check how well your lungs are working by measuring your peak expiratory flow (PEF) throughout the day. Your PEF may drop when you are near things that trigger symptoms. Where can you learn more? Go to http://www.logan.com/  Enter S900 in the search box to learn more about \"Learning About Asthma Triggers. \"  Current as of: February 24, 2020               Content Version: 12.6  © 2006-2020 [employer]. Care instructions adapted under license by [employer] (which disclaims liability or warranty for this information). If you have questions about a medical condition or this instruction, always ask your healthcare professional. [place] [address] any warranty or liability for your use of this information. Patient Education        Accidental Overdose of Medicine in Children: Care Instructions  Your Care Instructions     Almost any medicine can cause harm if your child takes too much of it. Your child has been treated to help his or her body get rid of an overdose of a medicine. This may have been an over-the-counter medicine. Or it might have been one that a doctor prescribed. It may even have been a vitamin or a supplement. During treatment, the doctor may have given your child fluids and medicine.  Your child also may have had lab tests. Then the doctor made sure that your child was well enough to go home. The doctor has checked your child carefully, but problems can develop later. If you notice any problems or new symptoms, get medical treatment right away. Follow-up care is a key part of your child's treatment and safety. Be sure to make and go to all appointments, and call your doctor if your child is having problems. It's also a good idea to know your child's test results and keep a list of the medicines your child takes. How can you care for your child at home? Home care  · Talk with your doctor about what your child should eat or drink. · If your child normally takes medicines, ask your doctor when your child can start taking them again. · Read the information that comes with any medicine. If you have questions, ask your doctor or pharmacist.  Prevention  · Be safe with medicines. Give all medicines exactly as prescribed or as the label directs. Call your doctor if you think your child is having a problem with his or her medicine. · Store all medicines out of the reach of children. Keep medicines in the containers they came in. Many of these are child-resistant. · Keep the phone number for the [facility] ([phone]) near your phone. When should you call for help? Poison control centers, hospitals, or your doctor can give immediate advice in the case of a poisoning. The [facility] number is [phone]. Have the poison container with you so you can give complete information to the poison control center. This includes what the poison or substance is, when it was taken, and how much was taken. Do not try to make your child vomit. Call 911 anytime you think your child may need emergency care. For example, call if:    · Your child passes out (loses consciousness).     · Your child has trouble breathing.     · Your child is sleepy or hard to wake up.   · Your child is having a seizure. Call your doctor now or seek immediate medical care if:    · Your child is vomiting.     · Your child has a new or worse headache.     · Your child is dizzy or lightheaded or feels like he or she may faint. Watch closely for changes in your child's health, and be sure to contact your doctor if:    · Your child does not get better as expected. Where can you learn more? Go to http://www.gray.com/  Enter Y500 in the search box to learn more about \"Accidental Overdose of Medicine in Children: Care Instructions. \"  Current as of: August 22, 2019               Content Version: 12.6  © 0488-1243 [employer]. Care instructions adapted under license by [employer] (which disclaims liability or warranty for this information). If you have questions about a medical condition or this instruction, always ask your healthcare professional. [place] [address] any warranty or liability for your use of this information. Patient Education        Accidental Overdose of Medicine: Care Instructions  Your Care Instructions     Almost any medicine can cause harm if you take too much of it. You have had treatment to help your body get rid of an overdose of a medicine. This may have been an over-the-counter medicine. Or it might be one that a doctor prescribed. It may even have been a vitamin or a supplement. During treatment, the doctor may have given you fluids and medicine. You also may have had lab tests. Then the doctor made sure that you were well enough to go home. The doctor has checked you carefully, but problems can develop later. If you notice any problems or new symptoms, get medical treatment right away. Follow-up care is a key part of your treatment and safety. Be sure to make and go to all appointments, and call your doctor if you are having problems.  It's also a good idea to know your test results and keep a list of the medicines you take. How can you care for yourself at home? Home care  · Talk with your doctor about what you should eat or drink. · If you normally take medicines, ask your doctor when you can start taking them again. · Read the information that comes with any medicine. If you have questions, ask your doctor or pharmacist.  Prevention  · Be safe with medicines. Take your medicines exactly as prescribed or as the label directs. Call your doctor if you think you are having a problem with your medicine. · Keep your medicines in the containers they came in. Keep them with the original labels. · Find out what to do if you miss a dose of your medicine. When should you call for help? Poison control centers, hospitals, or your doctor can give immediate advice in the case of a poisoning. The [employer] number is [phone]. Have the poison container with you so you can give complete information to the poison control center. This includes what the poison or substance is, when it was taken, and how much was taken. Do not try to make the person vomit. Call 911 anytime you think you may need emergency care. For example, call if you or someone else:    · Has used or currently uses alcohol or drugs and is very confused or can't stay awake.     · Has passed out (lost consciousness).     · Has severe trouble breathing.     · Is having a seizure. Call your doctor now or seek immediate medical care if:    · You are vomiting.     · You have a new or worse headache.     · You are dizzy or lightheaded or feel like you may faint. Watch closely for changes in your health, and be sure to contact your doctor if:    · You do not get better as expected. Where can you learn more? Go to http://www.gray.com/  Enter C165 in the search box to learn more about \"Accidental Overdose of Medicine: Care Instructions. \"  Current as of: August 22, 2898               NBILKKI Version: 12.6  © 5244-4155 [employer]. Care instructions adapted under license by [employer] (which disclaims liability or warranty for this information). If you have questions about a medical condition or this instruction, always ask your healthcare professional. [address] any warranty or liability for your use of this information.

## 2020-10-01 ENCOUNTER — PATIENT OUTREACH (OUTPATIENT)
Dept: CASE MANAGEMENT | Age: 30
End: 2020-10-01

## 2020-10-01 LAB — SARS-COV-2, COV2NT: NOT DETECTED

## 2020-10-01 NOTE — PROGRESS NOTES
Date/Time:  10/1/2020 11:02 AM  Attempted to reach patient by telephone. Left HIPPA compliant message with patient's mother requesting a return call. Will attempt to reach patient again.

## 2020-10-02 ENCOUNTER — PATIENT OUTREACH (OUTPATIENT)
Dept: CASE MANAGEMENT | Age: 30
End: 2020-10-02

## 2020-10-02 NOTE — PROGRESS NOTES
Date/Time:  10/2/2020 1:55 PM  Attempted to reach patient by telephone. Left HIPPA compliant message with patient's mother requesting a return call. Will attempt to reach patient again.

## 2020-12-10 ENCOUNTER — HOSPITAL ENCOUNTER (EMERGENCY)
Age: 30
Discharge: HOME | End: 2020-12-10
Attending: EMERGENCY MEDICINE
Payer: MEDICAID

## 2020-12-10 ENCOUNTER — APPOINTMENT (OUTPATIENT)
Dept: GENERAL RADIOLOGY | Age: 30
End: 2020-12-10
Attending: EMERGENCY MEDICINE
Payer: MEDICAID

## 2020-12-10 VITALS
TEMPERATURE: 99.8 F | DIASTOLIC BLOOD PRESSURE: 86 MMHG | HEART RATE: 108 BPM | OXYGEN SATURATION: 96 % | RESPIRATION RATE: 21 BRPM | SYSTOLIC BLOOD PRESSURE: 137 MMHG

## 2020-12-10 DIAGNOSIS — Z20.822 SUSPECTED COVID-19 VIRUS INFECTION: Primary | ICD-10-CM

## 2020-12-10 LAB
FLUAV AG NPH QL IA: NEGATIVE
FLUBV AG NOSE QL IA: NEGATIVE

## 2020-12-10 PROCEDURE — 99282 EMERGENCY DEPT VISIT SF MDM: CPT

## 2020-12-10 PROCEDURE — 74011250637 HC RX REV CODE- 250/637: Performed by: PHYSICIAN ASSISTANT

## 2020-12-10 PROCEDURE — 87804 INFLUENZA ASSAY W/OPTIC: CPT

## 2020-12-10 PROCEDURE — 71046 X-RAY EXAM CHEST 2 VIEWS: CPT

## 2020-12-10 PROCEDURE — 87635 SARS-COV-2 COVID-19 AMP PRB: CPT

## 2020-12-10 RX ORDER — ACETAMINOPHEN 325 MG/1
650 TABLET ORAL
Qty: 20 TAB | Refills: 0 | Status: SHIPPED | OUTPATIENT
Start: 2020-12-10

## 2020-12-10 RX ORDER — ACETAMINOPHEN 500 MG
1000 TABLET ORAL
Status: COMPLETED | OUTPATIENT
Start: 2020-12-10 | End: 2020-12-10

## 2020-12-10 RX ADMIN — ACETAMINOPHEN 1000 MG: 500 TABLET ORAL at 09:53

## 2020-12-10 NOTE — ED TRIAGE NOTES
Patient A/O x 4, presented to ED with complaint of SOB, generalized body aches, diarrhea, nausea x last night.

## 2020-12-10 NOTE — ED PROVIDER NOTES
EMERGENCY DEPARTMENT HISTORY AND PHYSICAL EXAM    9:30 AM      Date: 12/10/2020  Patient Name: Jarad Coleman    History of Presenting Illness     Chief Complaint   Patient presents with    Shortness of Breath    Nausea    Generalized Body Aches    Diarrhea       History Provided By: Patient    Chief Complaint: SOB, nausea, body aches, HA, diarrhea, fever  Duration: 1 Days  Timing:  Acute  Location:   Quality: Aching  Severity: Moderate  Modifying Factors: none  Associated Symptoms: denies any other associated signs or symptoms    Additional History (Context):Marquis INGRIS Chung is a 27 y.o. male with a history of heroin abuse who presents to the emergency department for evaluation of shortness of breath, nausea without vomiting, diarrhea, body aches, headache, and fever for the past day. Patient denies any recent ill contacts. He reports taking no medications to alleviate his symptoms prior to arrival.  Patient denies any urinary symptoms. He admits to rhinorrhea and nasal congestion. No loss of taste or smell. PCP:  Jeanne, MD [place]      Current Outpatient Medications   Medication Sig Dispense Refill    acetaminophen (TYLENOL) 325 mg tablet Take 2 Tabs by mouth every four (4) hours as needed for Pain. 20 Tab 0    naloxone (Narcan) 4 mg/actuation nasal spray Use 1 spray intranasally, then discard. Repeat with new spray every 2 min as needed for opioid overdose symptoms, alternating nostrils. 1 Each 0    albuterol (PROVENTIL HFA, VENTOLIN HFA, PROAIR HFA) 90 mcg/actuation inhaler Take 2 Puffs by inhalation every four (4) hours as needed for Wheezing. 1 Inhaler 5    apixaban (ELIQUIS) 5 mg tablet Take 5 mg by mouth two (2) times a day.  predniSONE (STERAPRED DS) 10 mg dose pack As directed 21 Tab 0    butalbital-acetaminophen-caff (FIORICET) -40 mg per capsule Take 1 Cap by mouth every four (4) hours as needed for Pain.  15 Cap 0       Past History     Past Medical History:  Past Medical History:   Diagnosis Date    Abnormal TSH 11/16/2017    Asthma     Heroin abuse ([facility] [address]     \"snorted\"    MDD (major depressive disorder), recurrent severe, without psychosis ([facility] 11/16/2017    Thromboembolus ([facility] 09/2017       Past Surgical History:  No past surgical history on file. Family History:  Family History   Problem Relation Age of Onset    No Known Problems Mother     Liver Disease Father        Social History:  Social History     Tobacco Use    Smoking status: Former Smoker     Types: Cigarettes    Smokeless tobacco: Never Used   Substance Use Topics    Alcohol use: No    Drug use: No       Allergies:  No Known Allergies      Review of Systems       Review of Systems   Constitutional: Positive for fever. Negative for chills. HENT: Negative for congestion, rhinorrhea and sore throat. Respiratory: Positive for cough and shortness of breath. Cardiovascular: Negative for chest pain. Gastrointestinal: Positive for diarrhea and nausea. Negative for abdominal pain, blood in stool, constipation and vomiting. Genitourinary: Negative for dysuria, frequency and hematuria. Musculoskeletal: Positive for myalgias. Negative for back pain. Skin: Negative for rash and wound. Neurological: Positive for headaches. Negative for dizziness. All other systems reviewed and are negative. Physical Exam     Visit Vitals  /86 (BP 1 Location: Left arm, BP Patient Position: At rest)   Pulse (!) 108   Temp 99.8 °F (37.7 °C)   Resp 21   SpO2 96%       Physical Exam  Vitals signs and nursing note reviewed. Constitutional:       General: He is not in acute distress. Appearance: He is well-developed. He is obese. He is ill-appearing. He is not toxic-appearing or diaphoretic. Comments: Appears ill, but nontoxic   HENT:      Head: Normocephalic and atraumatic. Nose: Congestion and rhinorrhea present.       Mouth/Throat:      Mouth: Mucous membranes are moist.      Pharynx: No pharyngeal swelling or oropharyngeal exudate. Eyes:      Conjunctiva/sclera: Conjunctivae normal.   Neck:      Musculoskeletal: Normal range of motion and neck supple. Cardiovascular:      Rate and Rhythm: Normal rate and regular rhythm. Heart sounds: Normal heart sounds. Pulmonary:      Effort: Pulmonary effort is normal. No respiratory distress. Breath sounds: Normal breath sounds. No decreased breath sounds, wheezing, rhonchi or rales. Comments: Lungs are clear to auscultation bilaterally  Chest:      Chest wall: No tenderness. Musculoskeletal: Normal range of motion. General: No deformity. Skin:     General: Skin is warm and dry. Neurological:      Mental Status: He is alert and oriented to person, place, and time. Diagnostic Study Results     Labs -  Recent Results (from the past 12 hour(s))   INFLUENZA A & B AG (RAPID TEST)    Collection Time: 12/10/20  9:52 AM   Result Value Ref Range    Influenza A Antigen Negative NEG      Influenza B Antigen Negative NEG         Radiologic Studies -   Xr Chest Pa Lat    Result Date: 12/10/2020  EXAM:  XR CHEST PA LAT INDICATION:   sob COMPARISON: 9/29/2020. FINDINGS: The cardiac and mediastinal silhouette are within normal limits. Pulmonary vasculature is within normal limits. No pneumothorax or pleural effusions. No air space opacity. No acute osseous abnormality. Impression: No radiographic evidence of acute cardiopulmonary process. Medical Decision Making   I am the first provider for this patient. I reviewed the vital signs, available nursing notes, past medical history, past surgical history, family history and social history. Vital Signs-Reviewed the patient's vital signs.     Pulse Oximetry Analysis -  96% on room air (Interpretation)    Records Reviewed: Nursing Notes and Old Medical Records (Time of Review: 9:30 AM)    ED Course: Progress Notes, Reevaluation, and Consults:    Provider Notes (Medical Decision Making):   Differential Diagnosis: COVID, influenza, other viral URI, pneumonia, asthma exacerbation, allergic rhinitis      Plan: Patient presents ambulatory in no significant distress with elevated heart rate and otherwise normal vitals. Examination and HPI consistent with acute, uncomplicated viral etiology. Influenza negative. Chest x-ray no acute process. Covid swab pending. Etiology is likely Covid. Patient advised of supportive care at home, including Tylenol, rest, and fluids. Return precautions discussed. At this time, patient is stable and appropriate for discharge home. Patient demonstrates understanding of current diagnoses and is in agreement with the treatment plan. They are advised that while the likelihood of serious underlying condition is low at this point given the evaluation performed today, we cannot fully rule it out. They are advised to immediately return with any new symptoms or worsening of current condition. All questions have been answered. Patient is given educational material regarding their diagnoses, including danger symptoms and when to return to the ED. Diagnosis     Clinical Impression:   1. Suspected COVID-19 virus infection        Disposition: DC Home    Follow-up Information     Follow up With Specialties Details Why Luis Felipe  Call To establish primary care [facility] [address]  [phone]    [facility] EMERGENCY DEPT Emergency Medicine Go to As needed, If symptoms worsen [address] [place] [address] [address] [address]  [phone]           Patient's Medications   Start Taking    ACETAMINOPHEN (TYLENOL) 325 MG TABLET    Take 2 Tabs by mouth every four (4) hours as needed for Pain. Continue Taking    ALBUTEROL (PROVENTIL HFA, VENTOLIN HFA, PROAIR HFA) 90 MCG/ACTUATION INHALER    Take 2 Puffs by inhalation every four (4) hours as needed for Wheezing.     APIXABAN (ELIQUIS) 5 MG TABLET    Take 5 mg by mouth two (2) times a day. BUTALBITAL-ACETAMINOPHEN-CAFF (FIORICET) -40 MG PER CAPSULE    Take 1 Cap by mouth every four (4) hours as needed for Pain. NALOXONE (NARCAN) 4 MG/ACTUATION NASAL SPRAY    Use 1 spray intranasally, then discard. Repeat with new spray every 2 min as needed for opioid overdose symptoms, alternating nostrils. PREDNISONE (STERAPRED DS) 10 MG DOSE PACK    As directed   These Medications have changed    No medications on file   Stop Taking    No medications on file     _______________________________    This note was dictated utilizing voice recognition software which may lead to typographical errors. I apologize in advance if the situation occurs. If questions arise please do not hesitate to contact me or call our department.   Peter Early PA-C

## 2020-12-11 ENCOUNTER — PATIENT OUTREACH (OUTPATIENT)
Dept: CASE MANAGEMENT | Age: 30
End: 2020-12-11

## 2020-12-11 LAB — SARS-COV-2, COV2NT: DETECTED

## 2020-12-11 NOTE — PROGRESS NOTES
.Date/Time:  12/11/2020 4:05 PM   Call within 2 business days of discharge: Yes   Attempted to reach patient by telephone. Left HIPPA compliant message requesting a return call. Will attempt to reach patient again. Seen in [facility] ED 12/11/2020 Headache; Generalized Body Aches; Diarrhea;  Shortness of Breath ;COVID19 tested

## 2020-12-16 ENCOUNTER — PATIENT OUTREACH (OUTPATIENT)
Dept: CASE MANAGEMENT | Age: 30
End: 2020-12-16

## 2020-12-28 ENCOUNTER — PATIENT OUTREACH (OUTPATIENT)
Dept: CASE MANAGEMENT | Age: 30
End: 2020-12-28

## 2020-12-28 NOTE — PROGRESS NOTES
.Patient resolved from [address] Transitions episode on 12/28/2020  Discussed COVID-19 related testing which was available at this time. Test results were positive. Patient informed of results, if available? yes     Patient/family has been provided the following resources and education related to COVID-19:                         Signs, symptoms and red flags related to COVID-19            [facility] exposure and quarantine guidelines            [facility] exposure contact - [phone]            Contact for their local Department of Health                 Patient currently reports that the following symptoms have improved:  no new symptoms. No further outreach scheduled with this CTN/ACM/LPN/HC/ MA. Episode of Care resolved. Patient has this CTN/ACM/LPN/HC/MA contact information if future needs arise.